# Patient Record
Sex: MALE | Race: OTHER | Employment: UNEMPLOYED | URBAN - METROPOLITAN AREA
[De-identification: names, ages, dates, MRNs, and addresses within clinical notes are randomized per-mention and may not be internally consistent; named-entity substitution may affect disease eponyms.]

---

## 2017-06-12 PROBLEM — M54.6 BILATERAL THORACIC BACK PAIN: Status: ACTIVE | Noted: 2017-06-12

## 2017-06-12 PROBLEM — R13.14 PHARYNGOESOPHAGEAL DYSPHAGIA: Status: ACTIVE | Noted: 2017-06-12

## 2017-06-12 PROBLEM — M54.2 CERVICAL PAIN: Status: ACTIVE | Noted: 2017-06-12

## 2017-06-28 ENCOUNTER — OFFICE VISIT (OUTPATIENT)
Dept: GASTROENTEROLOGY | Age: 47
End: 2017-06-28
Payer: MEDICARE

## 2017-06-28 VITALS
WEIGHT: 164 LBS | TEMPERATURE: 98.4 F | BODY MASS INDEX: 24.86 KG/M2 | SYSTOLIC BLOOD PRESSURE: 114 MMHG | OXYGEN SATURATION: 96 % | HEART RATE: 83 BPM | DIASTOLIC BLOOD PRESSURE: 71 MMHG | HEIGHT: 68 IN

## 2017-06-28 DIAGNOSIS — R10.13 DYSPEPSIA: Primary | ICD-10-CM

## 2017-06-28 DIAGNOSIS — R13.14 PHARYNGOESOPHAGEAL DYSPHAGIA: ICD-10-CM

## 2017-06-28 PROCEDURE — 99204 OFFICE O/P NEW MOD 45 MIN: CPT | Performed by: INTERNAL MEDICINE

## 2017-06-28 ASSESSMENT — ENCOUNTER SYMPTOMS
SORE THROAT: 1
ANAL BLEEDING: 0
RECTAL PAIN: 0
COUGH: 0
ABDOMINAL DISTENTION: 0
TROUBLE SWALLOWING: 1
ABDOMINAL PAIN: 0
BACK PAIN: 0
NAUSEA: 0
VOMITING: 0
CHOKING: 1
BLOOD IN STOOL: 0
DIARRHEA: 0
SHORTNESS OF BREATH: 0
CONSTIPATION: 0

## 2017-07-11 ENCOUNTER — HOSPITAL ENCOUNTER (OUTPATIENT)
Dept: GENERAL RADIOLOGY | Age: 47
Discharge: HOME | End: 2017-07-11
Payer: MEDICARE

## 2017-07-11 DIAGNOSIS — R13.14 PHARYNGOESOPHAGEAL DYSPHAGIA: ICD-10-CM

## 2017-07-11 DIAGNOSIS — R10.13 DYSPEPSIA: ICD-10-CM

## 2017-07-11 PROCEDURE — 74230 X-RAY XM SWLNG FUNCJ C+: CPT

## 2017-07-11 PROCEDURE — 74220 X-RAY XM ESOPHAGUS 1CNTRST: CPT

## 2017-07-11 PROCEDURE — 2500000003 HC RX 250 WO HCPCS: Performed by: INTERNAL MEDICINE

## 2017-07-11 PROCEDURE — G8997 SWALLOW GOAL STATUS: HCPCS

## 2017-07-11 PROCEDURE — G8996 SWALLOW CURRENT STATUS: HCPCS

## 2017-07-11 PROCEDURE — 92611 MOTION FLUOROSCOPY/SWALLOW: CPT

## 2017-07-11 RX ADMIN — BARIUM SULFATE 132 ML: 960 POWDER, FOR SUSPENSION ORAL at 09:45

## 2017-07-11 RX ADMIN — BARIUM SULFATE 135 ML: 980 POWDER, FOR SUSPENSION ORAL at 09:45

## 2017-07-11 RX ADMIN — BARIUM SULFATE 1 TABLET: 700 TABLET ORAL at 09:45

## 2017-07-13 ENCOUNTER — TELEPHONE (OUTPATIENT)
Dept: GASTROENTEROLOGY | Age: 47
End: 2017-07-13

## 2017-07-14 DIAGNOSIS — R13.10 DYSPHAGIA, UNSPECIFIED TYPE: Primary | ICD-10-CM

## 2017-07-21 ENCOUNTER — ANESTHESIA EVENT (OUTPATIENT)
Dept: OPERATING ROOM | Age: 47
End: 2017-07-21
Payer: MEDICARE

## 2017-07-24 ENCOUNTER — HOSPITAL ENCOUNTER (OUTPATIENT)
Age: 47
Setting detail: OUTPATIENT SURGERY
Discharge: HOME | End: 2017-07-24
Attending: INTERNAL MEDICINE | Admitting: INTERNAL MEDICINE
Payer: MEDICARE

## 2017-07-24 ENCOUNTER — ANESTHESIA (OUTPATIENT)
Dept: OPERATING ROOM | Age: 47
End: 2017-07-24
Payer: MEDICARE

## 2017-07-24 VITALS — SYSTOLIC BLOOD PRESSURE: 118 MMHG | DIASTOLIC BLOOD PRESSURE: 82 MMHG | OXYGEN SATURATION: 95 %

## 2017-07-24 VITALS
HEART RATE: 59 BPM | WEIGHT: 164.9 LBS | BODY MASS INDEX: 24.99 KG/M2 | TEMPERATURE: 97.7 F | HEIGHT: 68 IN | OXYGEN SATURATION: 95 % | RESPIRATION RATE: 12 BRPM | SYSTOLIC BLOOD PRESSURE: 101 MMHG | DIASTOLIC BLOOD PRESSURE: 67 MMHG

## 2017-07-24 PROCEDURE — 2580000003 HC RX 258: Performed by: ANESTHESIOLOGY

## 2017-07-24 PROCEDURE — 2500000003 HC RX 250 WO HCPCS: Performed by: NURSE ANESTHETIST, CERTIFIED REGISTERED

## 2017-07-24 PROCEDURE — 3700000000 HC ANESTHESIA ATTENDED CARE: Performed by: INTERNAL MEDICINE

## 2017-07-24 PROCEDURE — 3609012700 HC EGD DILATION SAVORY: Performed by: INTERNAL MEDICINE

## 2017-07-24 PROCEDURE — 3700000001 HC ADD 15 MINUTES (ANESTHESIA): Performed by: INTERNAL MEDICINE

## 2017-07-24 PROCEDURE — 6360000002 HC RX W HCPCS: Performed by: NURSE ANESTHETIST, CERTIFIED REGISTERED

## 2017-07-24 PROCEDURE — 7100000011 HC PHASE II RECOVERY - ADDTL 15 MIN: Performed by: INTERNAL MEDICINE

## 2017-07-24 PROCEDURE — 7100000010 HC PHASE II RECOVERY - FIRST 15 MIN: Performed by: INTERNAL MEDICINE

## 2017-07-24 RX ORDER — FENTANYL CITRATE 50 UG/ML
INJECTION, SOLUTION INTRAMUSCULAR; INTRAVENOUS PRN
Status: DISCONTINUED | OUTPATIENT
Start: 2017-07-24 | End: 2017-07-24 | Stop reason: SDUPTHER

## 2017-07-24 RX ORDER — LIDOCAINE HYDROCHLORIDE 20 MG/ML
INJECTION, SOLUTION EPIDURAL; INFILTRATION; INTRACAUDAL; PERINEURAL PRN
Status: DISCONTINUED | OUTPATIENT
Start: 2017-07-24 | End: 2017-07-24 | Stop reason: SDUPTHER

## 2017-07-24 RX ORDER — SODIUM CHLORIDE 9 MG/ML
INJECTION, SOLUTION INTRAVENOUS CONTINUOUS
Status: DISCONTINUED | OUTPATIENT
Start: 2017-07-24 | End: 2017-07-24

## 2017-07-24 RX ORDER — SODIUM CHLORIDE 0.9 % (FLUSH) 0.9 %
10 SYRINGE (ML) INJECTION EVERY 12 HOURS SCHEDULED
Status: DISCONTINUED | OUTPATIENT
Start: 2017-07-24 | End: 2017-07-24 | Stop reason: HOSPADM

## 2017-07-24 RX ORDER — SODIUM CHLORIDE 0.9 % (FLUSH) 0.9 %
10 SYRINGE (ML) INJECTION PRN
Status: DISCONTINUED | OUTPATIENT
Start: 2017-07-24 | End: 2017-07-24 | Stop reason: HOSPADM

## 2017-07-24 RX ORDER — SODIUM CHLORIDE, SODIUM LACTATE, POTASSIUM CHLORIDE, CALCIUM CHLORIDE 600; 310; 30; 20 MG/100ML; MG/100ML; MG/100ML; MG/100ML
INJECTION, SOLUTION INTRAVENOUS CONTINUOUS
Status: DISCONTINUED | OUTPATIENT
Start: 2017-07-24 | End: 2017-07-24 | Stop reason: HOSPADM

## 2017-07-24 RX ORDER — PROPOFOL 10 MG/ML
INJECTION, EMULSION INTRAVENOUS PRN
Status: DISCONTINUED | OUTPATIENT
Start: 2017-07-24 | End: 2017-07-24 | Stop reason: SDUPTHER

## 2017-07-24 RX ORDER — LIDOCAINE HYDROCHLORIDE 10 MG/ML
1 INJECTION, SOLUTION EPIDURAL; INFILTRATION; INTRACAUDAL; PERINEURAL
Status: DISCONTINUED | OUTPATIENT
Start: 2017-07-24 | End: 2017-07-24 | Stop reason: HOSPADM

## 2017-07-24 RX ADMIN — FENTANYL CITRATE 50 MCG: 50 INJECTION, SOLUTION INTRAMUSCULAR; INTRAVENOUS at 10:04

## 2017-07-24 RX ADMIN — LIDOCAINE HYDROCHLORIDE 100 MG: 20 INJECTION, SOLUTION EPIDURAL; INFILTRATION; INTRACAUDAL; PERINEURAL at 10:05

## 2017-07-24 RX ADMIN — SODIUM CHLORIDE, POTASSIUM CHLORIDE, SODIUM LACTATE AND CALCIUM CHLORIDE: 600; 310; 30; 20 INJECTION, SOLUTION INTRAVENOUS at 10:03

## 2017-07-24 RX ADMIN — PROPOFOL 20 MG: 10 INJECTION, EMULSION INTRAVENOUS at 10:11

## 2017-07-24 RX ADMIN — PROPOFOL 10 MG: 10 INJECTION, EMULSION INTRAVENOUS at 10:15

## 2017-07-24 RX ADMIN — PROPOFOL 40 MG: 10 INJECTION, EMULSION INTRAVENOUS at 10:09

## 2017-07-24 RX ADMIN — PROPOFOL 10 MG: 10 INJECTION, EMULSION INTRAVENOUS at 10:13

## 2017-07-24 RX ADMIN — FENTANYL CITRATE 50 MCG: 50 INJECTION, SOLUTION INTRAMUSCULAR; INTRAVENOUS at 10:09

## 2017-07-24 RX ADMIN — SODIUM CHLORIDE, POTASSIUM CHLORIDE, SODIUM LACTATE AND CALCIUM CHLORIDE: 600; 310; 30; 20 INJECTION, SOLUTION INTRAVENOUS at 09:57

## 2017-07-24 ASSESSMENT — ENCOUNTER SYMPTOMS: SHORTNESS OF BREATH: 0

## 2017-07-24 ASSESSMENT — PAIN - FUNCTIONAL ASSESSMENT: PAIN_FUNCTIONAL_ASSESSMENT: 0-10

## 2017-09-12 PROBLEM — Z00.00 ANNUAL PHYSICAL EXAM: Status: ACTIVE | Noted: 2017-09-12

## 2017-09-21 ENCOUNTER — HOSPITAL ENCOUNTER (OUTPATIENT)
Age: 47
Discharge: HOME | End: 2017-09-21
Payer: MEDICARE

## 2017-09-21 DIAGNOSIS — Z00.00 ANNUAL PHYSICAL EXAM: ICD-10-CM

## 2017-09-21 LAB
-: ABNORMAL
ABSOLUTE EOS #: 0 K/UL (ref 0–0.4)
ABSOLUTE LYMPH #: 1.5 K/UL (ref 1–4.8)
ABSOLUTE MONO #: 0.3 K/UL (ref 0.2–0.8)
ALBUMIN SERPL-MCNC: 4.3 G/DL (ref 3.5–5.2)
ALBUMIN/GLOBULIN RATIO: NORMAL (ref 1–2.5)
ALP BLD-CCNC: 57 U/L (ref 40–129)
ALT SERPL-CCNC: 24 U/L (ref 5–41)
AMORPHOUS: ABNORMAL
ANION GAP SERPL CALCULATED.3IONS-SCNC: 15 MMOL/L (ref 9–17)
AST SERPL-CCNC: 22 U/L
BACTERIA: ABNORMAL
BASOPHILS # BLD: 1 %
BASOPHILS ABSOLUTE: 0 K/UL (ref 0–0.2)
BILIRUB SERPL-MCNC: 0.67 MG/DL (ref 0.3–1.2)
BILIRUBIN URINE: NEGATIVE
BUN BLDV-MCNC: 12 MG/DL (ref 6–20)
BUN/CREAT BLD: 14 (ref 9–20)
CALCIUM SERPL-MCNC: 9.1 MG/DL (ref 8.6–10.4)
CASTS UA: ABNORMAL /LPF
CHLORIDE BLD-SCNC: 99 MMOL/L (ref 98–107)
CHOLESTEROL/HDL RATIO: 3
CHOLESTEROL: 208 MG/DL
CO2: 27 MMOL/L (ref 20–31)
COLOR: YELLOW
COMMENT UA: ABNORMAL
CREAT SERPL-MCNC: 0.88 MG/DL (ref 0.7–1.2)
CRYSTALS, UA: ABNORMAL /HPF
DIFFERENTIAL TYPE: NORMAL
EOSINOPHILS RELATIVE PERCENT: 1 %
EPITHELIAL CELLS UA: ABNORMAL /HPF
GFR AFRICAN AMERICAN: >60 ML/MIN
GFR NON-AFRICAN AMERICAN: >60 ML/MIN
GFR SERPL CREATININE-BSD FRML MDRD: NORMAL ML/MIN/{1.73_M2}
GFR SERPL CREATININE-BSD FRML MDRD: NORMAL ML/MIN/{1.73_M2}
GLUCOSE BLD-MCNC: 90 MG/DL (ref 70–99)
GLUCOSE URINE: NEGATIVE
HCT VFR BLD CALC: 48 % (ref 41–53)
HDLC SERPL-MCNC: 70 MG/DL
HEMOGLOBIN: 16 G/DL (ref 13.5–17.5)
KETONES, URINE: NEGATIVE
LDL CHOLESTEROL: 112 MG/DL (ref 0–130)
LEUKOCYTE ESTERASE, URINE: NEGATIVE
LYMPHOCYTES # BLD: 37 %
MCH RBC QN AUTO: 27.7 PG (ref 26–34)
MCHC RBC AUTO-ENTMCNC: 33.4 G/DL (ref 31–37)
MCV RBC AUTO: 83 FL (ref 80–100)
MONOCYTES # BLD: 8 %
MUCUS: ABNORMAL
NITRITE, URINE: NEGATIVE
OTHER OBSERVATIONS UA: ABNORMAL
PDW BLD-RTO: 12.7 % (ref 11.5–14.5)
PH UA: 6.5 (ref 5–8)
PLATELET # BLD: 189 K/UL (ref 130–400)
PLATELET ESTIMATE: NORMAL
PMV BLD AUTO: NORMAL FL (ref 6–12)
POTASSIUM SERPL-SCNC: 4 MMOL/L (ref 3.7–5.3)
PROSTATE SPECIFIC ANTIGEN: 1.42 UG/L
PROTEIN UA: NEGATIVE
RBC # BLD: 5.79 M/UL (ref 4.5–5.9)
RBC # BLD: NORMAL 10*6/UL
RBC UA: ABNORMAL /HPF (ref 0–2)
RENAL EPITHELIAL, UA: ABNORMAL /HPF
SEG NEUTROPHILS: 53 %
SEGMENTED NEUTROPHILS ABSOLUTE COUNT: 2.3 K/UL (ref 1.8–7.7)
SODIUM BLD-SCNC: 141 MMOL/L (ref 135–144)
SPECIFIC GRAVITY UA: 1.02 (ref 1–1.03)
TOTAL PROTEIN: 7 G/DL (ref 6.4–8.3)
TRICHOMONAS: ABNORMAL
TRIGL SERPL-MCNC: 131 MG/DL
TSH SERPL DL<=0.05 MIU/L-ACNC: 1.86 MIU/L (ref 0.3–5)
TURBIDITY: ABNORMAL
URINE HGB: NEGATIVE
UROBILINOGEN, URINE: NORMAL
VITAMIN D 25-HYDROXY: 35 NG/ML (ref 30–100)
VLDLC SERPL CALC-MCNC: ABNORMAL MG/DL (ref 1–30)
WBC # BLD: 4.1 K/UL (ref 3.5–11)
WBC # BLD: NORMAL 10*3/UL
WBC UA: ABNORMAL /HPF (ref 0–5)
YEAST: ABNORMAL

## 2017-09-21 PROCEDURE — 80053 COMPREHEN METABOLIC PANEL: CPT

## 2017-09-21 PROCEDURE — 85025 COMPLETE CBC W/AUTO DIFF WBC: CPT

## 2017-09-21 PROCEDURE — 36415 COLL VENOUS BLD VENIPUNCTURE: CPT

## 2017-09-21 PROCEDURE — G0103 PSA SCREENING: HCPCS

## 2017-09-21 PROCEDURE — 82306 VITAMIN D 25 HYDROXY: CPT

## 2017-09-21 PROCEDURE — 81001 URINALYSIS AUTO W/SCOPE: CPT

## 2017-09-21 PROCEDURE — 84443 ASSAY THYROID STIM HORMONE: CPT

## 2017-09-21 PROCEDURE — 80061 LIPID PANEL: CPT

## 2017-09-26 PROBLEM — Z71.85 IMMUNIZATION COUNSELING: Status: ACTIVE | Noted: 2017-09-26

## 2018-01-12 PROBLEM — B02.9 HERPES ZOSTER WITHOUT COMPLICATION: Status: ACTIVE | Noted: 2018-01-12

## 2018-03-26 PROBLEM — S80.01XA CONTUSION OF RIGHT KNEE: Status: ACTIVE | Noted: 2018-03-26

## 2018-04-12 PROBLEM — Z00.00 ANNUAL PHYSICAL EXAM: Status: RESOLVED | Noted: 2017-09-12 | Resolved: 2018-04-12

## 2018-06-05 PROBLEM — Z23 NEED FOR SHINGLES VACCINE: Status: ACTIVE | Noted: 2018-06-05

## 2018-07-17 PROBLEM — Z71.85 IMMUNIZATION COUNSELING: Status: RESOLVED | Noted: 2017-09-26 | Resolved: 2018-07-17

## 2018-07-17 PROBLEM — G89.29 CHRONIC BILATERAL LOW BACK PAIN WITHOUT SCIATICA: Status: ACTIVE | Noted: 2018-07-17

## 2018-07-17 PROBLEM — S80.01XA CONTUSION OF RIGHT KNEE: Status: RESOLVED | Noted: 2018-03-26 | Resolved: 2018-07-17

## 2018-07-17 PROBLEM — B02.9 HERPES ZOSTER WITHOUT COMPLICATION: Status: RESOLVED | Noted: 2018-01-12 | Resolved: 2018-07-17

## 2018-07-17 PROBLEM — M54.50 CHRONIC BILATERAL LOW BACK PAIN WITHOUT SCIATICA: Status: ACTIVE | Noted: 2018-07-17

## 2018-07-17 PROBLEM — Z23 NEED FOR SHINGLES VACCINE: Status: RESOLVED | Noted: 2018-06-05 | Resolved: 2018-07-17

## 2018-08-28 PROBLEM — M54.2 CERVICAL PAIN: Status: ACTIVE | Noted: 2018-08-28

## 2018-08-28 PROBLEM — M50.30 DDD (DEGENERATIVE DISC DISEASE), CERVICAL: Status: ACTIVE | Noted: 2018-08-28

## 2018-08-28 PROBLEM — Z12.11 SCREEN FOR COLON CANCER: Status: ACTIVE | Noted: 2018-08-28

## 2018-09-27 PROBLEM — Z12.11 SCREEN FOR COLON CANCER: Status: RESOLVED | Noted: 2018-08-28 | Resolved: 2018-09-27

## 2018-10-04 PROBLEM — M79.10 MYALGIA: Status: ACTIVE | Noted: 2018-10-04

## 2019-07-08 ENCOUNTER — HOSPITAL ENCOUNTER (OUTPATIENT)
Age: 49
Discharge: HOME | End: 2019-07-10
Payer: MEDICARE

## 2019-07-08 ENCOUNTER — HOSPITAL ENCOUNTER (OUTPATIENT)
Dept: GENERAL RADIOLOGY | Age: 49
Discharge: HOME | End: 2019-07-10
Payer: MEDICARE

## 2019-07-08 DIAGNOSIS — M25.572 ACUTE LEFT ANKLE PAIN: ICD-10-CM

## 2019-07-08 PROCEDURE — 73610 X-RAY EXAM OF ANKLE: CPT

## 2019-07-23 PROBLEM — M72.2 PLANTAR FASCIITIS: Status: ACTIVE | Noted: 2019-07-23

## 2019-07-30 ENCOUNTER — HOSPITAL ENCOUNTER (OUTPATIENT)
Age: 49
Discharge: HOME | End: 2019-07-30
Payer: MEDICARE

## 2019-07-30 DIAGNOSIS — E55.9 VITAMIN D DEFICIENCY: ICD-10-CM

## 2019-07-30 PROCEDURE — 82306 VITAMIN D 25 HYDROXY: CPT

## 2019-07-30 PROCEDURE — 36415 COLL VENOUS BLD VENIPUNCTURE: CPT

## 2019-07-31 LAB — VITAMIN D 25-HYDROXY: 30.9 NG/ML (ref 30–100)

## 2019-08-20 PROBLEM — M25.572 ACUTE LEFT ANKLE PAIN: Status: ACTIVE | Noted: 2019-08-20

## 2019-08-20 PROBLEM — M77.32 CALCANEAL SPUR OF FOOT, LEFT: Status: ACTIVE | Noted: 2019-08-20

## 2019-08-22 PROBLEM — Z12.11 SCREEN FOR COLON CANCER: Status: RESOLVED | Noted: 2018-08-28 | Resolved: 2019-08-22

## 2019-09-03 PROBLEM — R53.83 FATIGUE: Status: ACTIVE | Noted: 2019-09-03

## 2019-09-03 PROBLEM — Z00.00 ENCOUNTER FOR ANNUAL PHYSICAL EXAM: Status: ACTIVE | Noted: 2019-09-03

## 2019-09-03 PROBLEM — Z12.5 SCREENING FOR PROSTATE CANCER: Status: ACTIVE | Noted: 2019-09-03

## 2019-09-06 ENCOUNTER — TELEPHONE (OUTPATIENT)
Dept: GASTROENTEROLOGY | Age: 49
End: 2019-09-06

## 2019-09-18 ENCOUNTER — HOSPITAL ENCOUNTER (OUTPATIENT)
Age: 49
Discharge: HOME | End: 2019-09-18
Payer: MEDICARE

## 2019-09-18 DIAGNOSIS — E78.5 DYSLIPIDEMIA: ICD-10-CM

## 2019-09-18 DIAGNOSIS — R53.83 FATIGUE, UNSPECIFIED TYPE: ICD-10-CM

## 2019-09-18 DIAGNOSIS — Z12.5 SCREENING FOR PROSTATE CANCER: ICD-10-CM

## 2019-09-18 DIAGNOSIS — M79.18 MYOFASCIAL MUSCLE PAIN: ICD-10-CM

## 2019-09-18 LAB
ABSOLUTE EOS #: 0.19 K/UL (ref 0–0.44)
ABSOLUTE IMMATURE GRANULOCYTE: 0.03 K/UL (ref 0–0.3)
ABSOLUTE LYMPH #: 1.86 K/UL (ref 1.1–3.7)
ABSOLUTE MONO #: 0.72 K/UL (ref 0.1–1.2)
ALBUMIN SERPL-MCNC: 4.5 G/DL (ref 3.5–5.2)
ALBUMIN/GLOBULIN RATIO: 1.7 (ref 1–2.5)
ALP BLD-CCNC: 70 U/L (ref 40–129)
ALT SERPL-CCNC: 42 U/L (ref 5–41)
ANION GAP SERPL CALCULATED.3IONS-SCNC: 15 MMOL/L (ref 9–17)
AST SERPL-CCNC: 28 U/L
BASOPHILS # BLD: 0 % (ref 0–2)
BASOPHILS ABSOLUTE: <0.03 K/UL (ref 0–0.2)
BILIRUB SERPL-MCNC: 0.54 MG/DL (ref 0.3–1.2)
BUN BLDV-MCNC: 17 MG/DL (ref 6–20)
BUN/CREAT BLD: ABNORMAL (ref 9–20)
CALCIUM SERPL-MCNC: 9.1 MG/DL (ref 8.6–10.4)
CHLORIDE BLD-SCNC: 101 MMOL/L (ref 98–107)
CHOLESTEROL, FASTING: 241 MG/DL
CHOLESTEROL/HDL RATIO: 3.5
CO2: 23 MMOL/L (ref 20–31)
CREAT SERPL-MCNC: 0.87 MG/DL (ref 0.7–1.2)
DIFFERENTIAL TYPE: ABNORMAL
EOSINOPHILS RELATIVE PERCENT: 2 % (ref 1–4)
GFR AFRICAN AMERICAN: >60 ML/MIN
GFR NON-AFRICAN AMERICAN: >60 ML/MIN
GFR SERPL CREATININE-BSD FRML MDRD: ABNORMAL ML/MIN/{1.73_M2}
GFR SERPL CREATININE-BSD FRML MDRD: ABNORMAL ML/MIN/{1.73_M2}
GLUCOSE FASTING: 111 MG/DL (ref 70–99)
HCT VFR BLD CALC: 48 % (ref 40.7–50.3)
HDLC SERPL-MCNC: 68 MG/DL
HEMOGLOBIN: 15.8 G/DL (ref 13–17)
IMMATURE GRANULOCYTES: 0 %
LDL CHOLESTEROL: 159 MG/DL (ref 0–130)
LYMPHOCYTES # BLD: 23 % (ref 24–43)
MCH RBC QN AUTO: 27.9 PG (ref 25.2–33.5)
MCHC RBC AUTO-ENTMCNC: 32.9 G/DL (ref 28.4–34.8)
MCV RBC AUTO: 84.8 FL (ref 82.6–102.9)
MONOCYTES # BLD: 9 % (ref 3–12)
NRBC AUTOMATED: 0 PER 100 WBC
PDW BLD-RTO: 13.7 % (ref 11.8–14.4)
PLATELET # BLD: 218 K/UL (ref 138–453)
PLATELET ESTIMATE: ABNORMAL
PMV BLD AUTO: 11 FL (ref 8.1–13.5)
POTASSIUM SERPL-SCNC: 4 MMOL/L (ref 3.7–5.3)
PROSTATE SPECIFIC ANTIGEN: 2.03 UG/L
RBC # BLD: 5.66 M/UL (ref 4.21–5.77)
RBC # BLD: ABNORMAL 10*6/UL
SEDIMENTATION RATE, ERYTHROCYTE: 1 MM (ref 0–10)
SEG NEUTROPHILS: 65 % (ref 36–65)
SEGMENTED NEUTROPHILS ABSOLUTE COUNT: 5.26 K/UL (ref 1.5–8.1)
SODIUM BLD-SCNC: 139 MMOL/L (ref 135–144)
TOTAL PROTEIN: 7.2 G/DL (ref 6.4–8.3)
TRIGLYCERIDE, FASTING: 70 MG/DL
TSH SERPL DL<=0.05 MIU/L-ACNC: 1.19 MIU/L (ref 0.3–5)
VLDLC SERPL CALC-MCNC: ABNORMAL MG/DL (ref 1–30)
WBC # BLD: 8.1 K/UL (ref 3.5–11.3)
WBC # BLD: ABNORMAL 10*3/UL

## 2019-09-18 PROCEDURE — 85025 COMPLETE CBC W/AUTO DIFF WBC: CPT

## 2019-09-18 PROCEDURE — G0103 PSA SCREENING: HCPCS

## 2019-09-18 PROCEDURE — 84443 ASSAY THYROID STIM HORMONE: CPT

## 2019-09-18 PROCEDURE — 36415 COLL VENOUS BLD VENIPUNCTURE: CPT

## 2019-09-18 PROCEDURE — 80053 COMPREHEN METABOLIC PANEL: CPT

## 2019-09-18 PROCEDURE — 80061 LIPID PANEL: CPT

## 2019-09-18 PROCEDURE — 85651 RBC SED RATE NONAUTOMATED: CPT

## 2019-10-03 PROBLEM — Z12.11 SCREEN FOR COLON CANCER: Status: RESOLVED | Noted: 2018-08-28 | Resolved: 2019-10-03

## 2019-10-03 PROBLEM — Z12.5 SCREENING FOR PROSTATE CANCER: Status: RESOLVED | Noted: 2019-09-03 | Resolved: 2019-10-03

## 2020-01-11 ENCOUNTER — HOSPITAL ENCOUNTER (OUTPATIENT)
Dept: MRI IMAGING | Age: 50
Discharge: HOME | End: 2020-01-13
Payer: MEDICARE

## 2020-01-11 PROCEDURE — 73721 MRI JNT OF LWR EXTRE W/O DYE: CPT

## 2020-02-06 ENCOUNTER — HOSPITAL ENCOUNTER (OUTPATIENT)
Dept: GENERAL RADIOLOGY | Age: 50
Discharge: HOME | End: 2020-02-08
Payer: MEDICARE

## 2020-02-06 ENCOUNTER — HOSPITAL ENCOUNTER (OUTPATIENT)
Age: 50
Discharge: HOME | End: 2020-02-08
Payer: MEDICARE

## 2020-02-06 PROCEDURE — 73080 X-RAY EXAM OF ELBOW: CPT

## 2020-02-07 PROBLEM — M25.521 RIGHT ELBOW PAIN: Status: ACTIVE | Noted: 2020-02-07

## 2020-04-30 PROBLEM — Z20.828 VIRAL DISEASE EXPOSURE: Status: ACTIVE | Noted: 2020-04-30

## 2020-04-30 PROBLEM — M77.01 MEDIAL EPICONDYLITIS OF RIGHT ELBOW: Status: ACTIVE | Noted: 2020-04-30

## 2020-04-30 PROBLEM — M77.11 LATERAL EPICONDYLITIS OF RIGHT ELBOW: Status: ACTIVE | Noted: 2020-04-30

## 2020-05-11 PROBLEM — M77.12 LATERAL EPICONDYLITIS OF BOTH ELBOWS: Status: ACTIVE | Noted: 2020-04-30

## 2020-06-09 ENCOUNTER — HOSPITAL ENCOUNTER (OUTPATIENT)
Dept: GENERAL RADIOLOGY | Age: 50
Discharge: HOME | End: 2020-06-11
Payer: MEDICARE

## 2020-06-09 ENCOUNTER — HOSPITAL ENCOUNTER (OUTPATIENT)
Age: 50
Discharge: HOME | End: 2020-06-11
Payer: MEDICARE

## 2020-06-09 PROBLEM — M65.4 TENDINITIS, DE QUERVAIN'S: Status: ACTIVE | Noted: 2020-06-09

## 2020-06-09 PROBLEM — M25.532 LEFT WRIST PAIN: Status: ACTIVE | Noted: 2020-06-09

## 2020-06-09 PROCEDURE — 73110 X-RAY EXAM OF WRIST: CPT

## 2020-09-30 PROBLEM — K13.70 MOUTH LESION: Status: ACTIVE | Noted: 2020-09-30

## 2020-10-01 ENCOUNTER — HOSPITAL ENCOUNTER (OUTPATIENT)
Age: 50
Discharge: HOME | End: 2020-10-01
Payer: MEDICARE

## 2020-10-01 LAB
CHOLESTEROL, FASTING: 193 MG/DL
CHOLESTEROL/HDL RATIO: 3.4
HDLC SERPL-MCNC: 56 MG/DL
LDL CHOLESTEROL: 123 MG/DL (ref 0–130)
TRIGLYCERIDE, FASTING: 70 MG/DL
VLDLC SERPL CALC-MCNC: NORMAL MG/DL (ref 1–30)

## 2020-10-01 PROCEDURE — 80061 LIPID PANEL: CPT

## 2020-10-01 PROCEDURE — 36415 COLL VENOUS BLD VENIPUNCTURE: CPT

## 2020-11-18 PROBLEM — R13.14 PHARYNGOESOPHAGEAL DYSPHAGIA: Status: RESOLVED | Noted: 2017-06-12 | Resolved: 2020-11-18

## 2020-11-18 PROBLEM — M54.2 CERVICAL PAIN: Status: ACTIVE | Noted: 2020-11-18

## 2021-04-15 PROBLEM — Z02.89 HEALTH EXAMINATION OF DEFINED SUBPOPULATION: Status: ACTIVE | Noted: 2021-04-15

## 2021-04-15 PROBLEM — Z23 NEED FOR PROPHYLACTIC VACCINATION WITH MEASLES-MUMPS-RUBELLA (MMR) VACCINE: Status: ACTIVE | Noted: 2021-04-15

## 2021-04-15 PROBLEM — Z23 NEED FOR PROPHYLACTIC VACCINATION WITH COMBINED DIPHTHERIA-TETANUS-PERTUSSIS (DTP) VACCINE: Status: ACTIVE | Noted: 2021-04-15

## 2021-04-15 PROBLEM — Z23 NEED FOR HEPATITIS A IMMUNIZATION: Status: ACTIVE | Noted: 2021-04-15

## 2021-04-19 PROBLEM — M77.12 LATERAL EPICONDYLITIS OF LEFT ELBOW: Status: ACTIVE | Noted: 2021-04-19

## 2021-05-15 PROBLEM — Z12.11 SCREEN FOR COLON CANCER: Status: RESOLVED | Noted: 2018-08-28 | Resolved: 2021-05-15

## 2021-07-28 ENCOUNTER — INITIAL CONSULT (OUTPATIENT)
Dept: PHYSICAL MEDICINE AND REHAB | Age: 51
End: 2021-07-28
Payer: MEDICARE

## 2021-07-28 VITALS
WEIGHT: 197.2 LBS | BODY MASS INDEX: 29.89 KG/M2 | DIASTOLIC BLOOD PRESSURE: 78 MMHG | SYSTOLIC BLOOD PRESSURE: 128 MMHG | HEART RATE: 98 BPM | HEIGHT: 68 IN | TEMPERATURE: 98.1 F

## 2021-07-28 DIAGNOSIS — M54.2 CHRONIC NECK PAIN: Primary | ICD-10-CM

## 2021-07-28 DIAGNOSIS — G89.29 CHRONIC NECK PAIN: Primary | ICD-10-CM

## 2021-07-28 DIAGNOSIS — M77.12 LEFT LATERAL EPICONDYLITIS: ICD-10-CM

## 2021-07-28 PROCEDURE — G8417 CALC BMI ABV UP PARAM F/U: HCPCS | Performed by: STUDENT IN AN ORGANIZED HEALTH CARE EDUCATION/TRAINING PROGRAM

## 2021-07-28 PROCEDURE — 99203 OFFICE O/P NEW LOW 30 MIN: CPT | Performed by: STUDENT IN AN ORGANIZED HEALTH CARE EDUCATION/TRAINING PROGRAM

## 2021-07-28 PROCEDURE — G8427 DOCREV CUR MEDS BY ELIG CLIN: HCPCS | Performed by: STUDENT IN AN ORGANIZED HEALTH CARE EDUCATION/TRAINING PROGRAM

## 2021-07-28 PROCEDURE — 1036F TOBACCO NON-USER: CPT | Performed by: STUDENT IN AN ORGANIZED HEALTH CARE EDUCATION/TRAINING PROGRAM

## 2021-07-28 PROCEDURE — 3017F COLORECTAL CA SCREEN DOC REV: CPT | Performed by: STUDENT IN AN ORGANIZED HEALTH CARE EDUCATION/TRAINING PROGRAM

## 2021-07-28 RX ORDER — M-VIT,TX,IRON,MINS/CALC/FOLIC 27MG-0.4MG
1 TABLET ORAL DAILY
COMMUNITY
End: 2022-09-08

## 2021-07-28 RX ORDER — CYCLOSPORINE 0.5 MG/ML
EMULSION OPHTHALMIC
COMMUNITY
Start: 2021-07-27 | End: 2022-09-08

## 2021-07-28 RX ORDER — CHLORAL HYDRATE 500 MG
3000 CAPSULE ORAL 3 TIMES DAILY
COMMUNITY
End: 2022-09-08

## 2021-08-01 NOTE — PROGRESS NOTES
[facility]  [facility] PHYSICAL MEDICINE AND REHABILITATION  [address] [address]  Dept: [phone]  Dept Fax: [phone]    New Patient Note    Aly Zhu is a 46y.o.-year-old right-handed male presenting with Neck Pain and Arm Pain  . HPI:     He reports constant bilateral neck pain, worse on the left, which has been present for about 20 years. He states that he had an injury to the neck in 2001 in which a box fell onto his posterior head. He subsequently had pain, numbness/tingling, and weakness in the bilateral upper limbs. He states that he started going to a chiropractor in 2008, which helps. He has not had surgery on his neck. He describes his current pain as an ache and \"tightness. \"  He notes feeling \"restricted\" with ROM in the neck as well. He denies any radiation of pain to the bilateral upper limbs. He also denies any numbness/tingling in the bilateral upper limbs. He reports weakness in the left hand related to pain in the left elbow and forearm but denies any other weakness. He also notes constant left upper limb pain, which he localizes from the left elbow to the hand. He states that pain affects the medial and lateral elbow and affects the thumb more than the other digits. He describes the pain as aching and notes that it is worse with making a fist and lifting things, as well as with yard work. He rates it at 7-8 out of 10. He reports having some intermittent pain in the right upper limb as well but rates it at 1 out of 10. He is taking celebrex, amitriptyline, and as-needed flexeril, which help some with pain. He has also tried heat/ice, voltaren gel (on left upper limb), TENS unit (on neck), an OTC brace (on left upper limb), and baclofen - none of which helped. He has not done physical therapy for neck or left upper limb pain. He does swim and/or participate in aerobic water exercises about 3 times weekly.       Past Medical History: Diagnosis Date    ALT (SGPT) level raised 2015    Bilateral thoracic back pain 2015    Bronchitis 2015    Contusion of right knee 3/26/2018    DDD (degenerative disc disease), cervical 2015    Dyslipidemia 2015    Dyspepsia 2015    Fractured toe 2020    Herniated cervical disc 2015    Herpes zoster without complication 1561    Hypoglycemia 2016    Hypogonadism in male     Immunization counseling 2017    Insomnia 2015    Leukocytopenia, unspecified 2016    Leukopenia 2016    Myofascial muscle pain 2015    Need for shingles vaccine 2018    Pain in abdominal muscle of right flank 2016    Pharyngoesophageal dysphagia 2017    Post laminectomy syndrome 10/17/2016    Prostate cancer screening 2016    Rhinosinusitis 2015      Past Surgical History:   Procedure Laterality Date    CARDIAC CATHETERIZATION  2006    COLONOSCOPY      ENDOSCOPY, COLON, DIAGNOSTIC  2008    HERNIA REPAIR Bilateral 2005    LUMBAR LAMINECTOMY  2013    by Dr. Khanh Garcia N/A 2017    EGD DILATION SAVORY performed by Kurt Ojeda MD at [address] [place] [address] History   Family history unknown: Yes     Social History     Socioeconomic History    Marital status:      Spouse name: None    Number of children: None    Years of education: None    Highest education level: None   Occupational History    None   Tobacco Use    Smoking status: Former Smoker     Quit date: 1997     Years since quittin.5    Smokeless tobacco: Never Used   Vaping Use    Vaping Use: Never used   Substance and Sexual Activity    Alcohol use: No    Drug use: No    Sexual activity: Yes     Partners: Female   Other Topics Concern    None   Social History Narrative    None     Social Determinants of Health     Financial Resource Strain:     Difficulty of Paying Living Expenses:    Food Insecurity:     Negative for numbness. Objective:     Physical Exam  /78   Pulse 98   Temp 98.1 °F (36.7 °C) (Skin)   Ht 5' 8\" (1.727 m)   Wt 197 lb 3.2 oz (89.4 kg)   BMI 29.98 kg/m²     Constitutional: He appears well-developed and well-nourished. In no distress. HEENT: NCAT, EOMI. Hearing grossly intact. Pulmonary/Chest: Respirations WNL and unlabored. MSK:  Tenderness to palpation of the superior cervical paraspinal musculature near the base of the skull. No tenderness to palpation of the cervical spine, upper thoracic spine, bilateral trapezius muscles, and upper thoracic paraspinal musculature. Normal cervical spine ROM; pain with left rotation and right lateral bending. Strength 5/5 in bilateral upper limbs. Tenderness to palpation of the left wrist extensor tendons. Pain with resisted left wrist extension. Pain at the left lateral elbow with lifting a chair with bilateral elbows extended. Neurological: He is alert and oriented to person, place, and time. Sensation to light touch intact in bilateral upper limbs. Negative Tinel sign at the left medial elbow. Skin: Skin is warm and dry with good turgor. Psychiatric: He has a normal mood and affect. His behavior is normal. Thought content normal.    Nursing note and vitals reviewed. Assessment:       Diagnosis Orders   1. Chronic neck pain  [facility]   2.  Left lateral epicondylitis  [address] [place] [address], Sports Medicine and Primary Care  [place]          Plan:      - Placed referral for PT for neck pain and left lateral elbow pain  - Placed referral to Dr. Octavio Cesar for possible ultrasound-guided injection for left lateral epicondylitis to help patient with tolerance to therapy  - Patient is currently taking celebrex, amitriptyline, and as-needed flexeril for pain  - Could consider MRI of cervical spine in the future if pain is persistent    Orders Placed This Encounter Procedures    [facility]     Referral Priority:   Routine     Referral Type:   Eval and Treat     Referral Reason:   Specialty Services Required     Requested Specialty:   Physical Therapy     Number of Visits Requested:   Marie Monique DO, Sports Medicine and Primary Care  [place]     Referral Priority:   Routine     Referral Type:   Eval and Treat     Referral Reason:   Specialty Services Required     Referred to Provider:   Yusuf Wilkins DO     Requested Specialty:   Sports Medicine     Number of Visits Requested:   1       Return in about 3 months (around 10/28/2021).        Electronically signed by Temitope Diaz MD on 8/1/2021 at 8:08 PM

## 2021-08-02 ENCOUNTER — HOSPITAL ENCOUNTER (OUTPATIENT)
Dept: PHYSICAL THERAPY | Facility: CLINIC | Age: 51
Setting detail: THERAPIES SERIES
Discharge: HOME | End: 2021-08-02
Payer: MEDICARE

## 2021-08-02 PROCEDURE — 97110 THERAPEUTIC EXERCISES: CPT

## 2021-08-02 PROCEDURE — 97162 PT EVAL MOD COMPLEX 30 MIN: CPT

## 2021-08-02 NOTE — CONSULTS
[] [facility]) - [facility]  [address]  [phone]  F: [phone] [x] [address]  [place] [address]   [address]  P: [phone]  F: [phone] [] [address] &  Therapy  [address]  P: [phone]  F: [phone] [] [address]  P: [phone]  F: [phone] [] [address]  [facility]   [address]   [place]: [phone]  F: [phone]        Physical Therapy Upper Extremity Evaluation    Date:  2021  Patient: Noris Freire  : 1970  MRN: [id number]  Physician: Manuel Jurado MD   Insurance: [employer] (30 vs/ year)  Medical Diagnosis: M54.2, G89.29 (ICD-10-CM) - Chronic neck pain; M77.12 (ICD-10-CM) - Left lateral epicondylitis   Rehab Codes: M54.2, M79.601, M79.602, M62.81  Onset Date: 21   Next 's appt: 10/12/21    Subjective:   CC: neck pain with headaches; BUE pain limiting activity tolerance  HPI: (onset date): Pt is a R handed 46 y.o. male with complaints of chronic neck pain and BUE elbow pain (L>R). Pt reports having an injury back in  when a 10 lb box fell on the back of his head and he has had neck spasms since. Pt notes he has also had radicular symptoms following this injury which have waxed and waned over the years. Pt did undergo aquatic therapy for this weakness which did help. Pt states his muscle spasms are worse on the left side and states the neck pain is constant. Pt notes his pain at rest is a 2-3/10 and can increase to 8-10/10. Pt describes the pain as an aching pain. Pt reports his pain is worsened with sustained positions of his head/neck (I.e. rotation) and not getting adjusted by his chiropractor.  Pt notes he has difficulty driving secondary to pain turning his head to the R and left. Pt also notes a constant buzzing in his ears which intensifies if he tightens his jaw. Pt also reports headaches that come and go every 2-3 days. Pt reports the pain is on his temples and can be in the back of his head as well. Pt notes the headaches go away on their own. Pt notes his pain is improved with chiropractic adjustments along with medication. Pt also reports having left elbow pain that has been worsening since 2015/2016. Pt reports he has also noticed the pain to be present on the R side at times. Pt states the pain starts on the inside of his elbow and radiates down to the top of his thumb. Pt denies any numbness or tingling but has noticed  strength deficits secondary to pain. Pt also reports having issues with dropping objects due to pain vs weakness. Pt reports this pain is an aching pain and can range from 0-7/8/10. Pt reports this pain is worsened with increased activity with his UEs and he cannot lift heavy objects due to the pain. Pt does not his left thumb pops a lot and it is noticed on the R as well. Pt reports she has difficulty falling asleep secondary to his neck and arm pain but does not wake due to pain. Pt denies any hx of neck surgeries or injections. Pt also reports having an EMG (2006) completed without any abnormal findings.      Comorbidities:   [] Obesity [] Dialysis  [x] Other: Hx of lumbar laminectomy   [] Asthma/COPD [] Dementia [x] Other: DDD, cervical    [] Stroke [] Sleep apnea [] Other:   [] Vascular disease [] Rheumatic disease [] Other:     Tests: [] X-Ray: [] MRI:  [] Other:    Medications: [x] Refer to full medical record [] None [] Other:  Allergies:      [x] Refer to full medical record [] None [] Other:    Function:  Hand Dominance  [x] Right  [] Left  Patient lives with: Family    Employer    Job Status []  Normal duty   [] Light duty   [] Off due to condition    []  Retired   [x] Not employed   [] Disability  [] Other:  [] Return to work: Work activities/duties           Yes  No Comments   Fatigue [] [x]    Fever/chills/sweats [] [x]    Malaise  [] [x]    Mental status change [] [x]    Paresthesias/numbness/weakness [] [x]    Unexplained weight changes [] [x]    Lightheaded/dizziness [] [x]    Shortness of breath [] [x]        Objective:  OBSERVATION No Deficit Deficit Not Tested Comments   Forward Head [] [x] []    Rounded Shoulders [] [x] []    Kyphosis [x] [] []    Scap Height/Position [x] [] []    Winging [x] [] []    SH Rhythm [x] [] []    INSPECTION/PALPATION    Tender over T4  Left side of cervical spine and lateral suboccipital region  Left medial and lateral epicondyle, extensor muscle mass, dorsum of the thumb (dequervains)  Right lateral epicondyle and dorsum of thumb (greater tenderness over dorsum of thumb on R vs left)   NEUROLOGICAL       Sensation [x] [] []      *= PAIN ROM  °A/P STRENGTH TESTS (+/-) Left Right   AROM Left Right Left Right Spurlings - -   Cervical flexion 55  tight WFL WFL Sharp's filippo - -   Extension  43  tight WFL WFL Alar ligament test - -   Rotation  40   pain 54 WFL* WFL Finkelstein  + +   Sidebend 20  Tight and painful on left side 25 painful on left side   Golfer's elbow test - +   Thoracic rotation limited  WFL   Cervical distraction - -   Flexion Symmetrical 5* 5 Froment's sign - -   ABD (0-180) Symmetrical  5* 5 Vertebral artery test - -   ER @ 0 45 90 (0-90) Symmetrical   Pain on left side 5 5      IR (0-90) Symmetrical  5* 5      Elbow Flex. (0-150)   5 5      Elbow Ext. (5/10-0)   5 5      Pronation (0-90)   5* 5      Supination (0-90)   5 5*      Wrist Flex. (0-80)   4+* 5      Wrist Ext. (0-70)   5 5*      Rad. Dev. (0-20)   5 5*      Ulnar Dev.  (0-30)   5 4+*       (elbow bent)   51.7 lbs  pain 67.3 lbs      Pinch (between thumb and index finger)   10.3 lbs 10.3 lbs      Cued to lift head off table: initial movement leading with global neck flexors- head protraction vs retraction   Deep neck flexor endurance test: 30\" with onset of pain  Scapular endurance test (seated with arms in 90/90 position and maintaining scapular retraction): 98\" with some discomfort noted           FUNCTION Normal Difficult Unable   Overhead reach [] [x] []   Underarm reach  [] [x] []   Groom/Dress [] [x] []   Bra/Shirt tuck [] [x] []   Lift/Carry [] [x] []    [] [] []     Outcome Measure: UEFS: 32.5% impairment; NDI: 56% impairment       Assessment: Aly Zhu is a 46 y.o. male who is being seen for chronic neck pain and left elbow pain. Pt reports the left elbow pain has been ongoing for the last 5-6 years and is predominantly on the left, but can also occur on the R. Pt demonstrates with negative ligamentous testing of the cervical spine but does demonstrated altered neuromuscular control with utilization of gobal neck flexors vs deep neck flexors. Pt also demonstrates decreased mobility of the cervical and thoracic spine. Along with chronic neck pain, pt has been having pain in the left elbow which radiates into the thumb and has also been noted on the R side. During the evaluation this date, pt's UE symptoms were not reproduced with cervical testing which indicates the pain is originating from the medial/lateral epicondyle vs the cervical spine. Pt demonstrates with increased pain on the left medial and lateral epicondyles and painful gripping and a + Finkelstein (DeQuervains tenosynovitis). Pt presents with signs and symptoms consistent with lateral epicondylitis along with some medial epicondyle irritation. Pt also demonstrates symptoms on the RUE, however, less irritable. At this time, pt will benefit from skilled therapy to address neck and BUE pain along with mobility issues in order to improve activity tolerance and QOL. Will address RUE symptoms pending signature from referring position. Problems:    [x] ? Pain: neck and LUE pain   [x] ?  ROM: decreased neck rotation and decreased flexibility in UEs  [x] ? Strength: decreased and painful  strength; decreased deep neck flexor endurance  [x] ? Function: UEFS: 32.5% impairment; NDI: 56% impairment       UEs Short term goals: MEET IN 8 VISITS Status   Pain: Pt will report less than or equal to 5-6/10 left elbow pain at worst throughout the day in order to improve tolerance to use of UEs for ADLs, housework, and lifting. Ongoing   ROM: Pt will demonstrate a negative Finkelstein test on BUE in order to improve ability to grasp the UEs with minimal discomfort. Ongoing   Strength: Pt will demonstrate ability to complete gripping tasks with less than or equal to 0-1/10 increase in left elbow pain in order to improve tolerance to ADLs. Ongoing   Home Exercise Program: Pt will demonstrate independence with HEP. Ongoing   Longer term goals: MEET IN 16 VISITS    Pain: Pt will report less than or equal to 3-4/10 left elbow pain at worst throughout the day in order to improve tolerance to use of UEs for ADLs, housework, and lifting. Ongoing   ROM: Pt will be able to demonstrate full AROM of the UEs with 0/10 pain in the medial and lateral elbow in order to improve use during ADLs and improve activity tolerance. Ongoing   Strength: Pt will demonstrate greater than or equal to 5/5 BUE strength with 0/10 elbow pain with testing and an improvement in left  strength by 5 lbs to improve tolerance to lifting, grasping, and ADL completion. Ongoing   Outcome Score: Pt will improve the UEFS score by greater than or equal to 10% in order to demonstrate an improvement in function. Ongoing     Neck STG: (to be met in 8 treatments)  1. ? Pain: Pt will report less than or equal to 6-7/10 neck pain at most throughout the day and a decrease in headaches by 25% in order to improve tolerance to ADL completion and improve QOL.   2. ? ROM: Pt will be able to demonstrate neck rotation to the R and left to greater than or equal to 60 degrees in order to assist scanning the environment and safe driving. 3. ? Strength: Pt will be able to complete the 10 head lifts off the table with initiating movement from deep neck flexors vs global neck flexors in order to demonstrate neuromuscular re-education and postural awareness/strenght. 4. ? Function: Pt will score less than or equal to 50% on the NDI in order to demonstrate improved function on ADLs. 5. Independent with Home Exercise Programs  LTG: (to be met in 16 treatments)  1. ? Pain: Pt will report less than or equal 4-5/10 neck pain at most throughout the day and a decrease in headaches by 50% in order to improve tolerance to ADL completion and improve QOL. 2. ? ROM: Pt will demonstrate cervical ROM to greater than or equal to 70 degrees rotation (B) and maintain for 10\" and flexion, extension, and SB without pain/reports of tightness in order to improve tolerance to driving and completing ADLs. 3. ? Strength: Pt will demonstrate ability to complete deep neck flexor endurance test for greater than or equal to 38\" without an increase in neck pain in order to demonstrate improved postural strength and control when completing ADLs. 4. ? Function: Pt will score less than or equal to 45% on the NDI in order to demonstrate improved function with ADLs.                    Patient goals: to feel better, strong without pain     Rehab Potential:  [] Good  [x] Fair  [] Poor   Suggested Professional Referral:  [] No  [x] Yes: further evaluation if symptoms do not resolve  Barriers to Goal Achievement[de-identified]  [] No  [x] Yes: chronicity of symptoms   Domestic Concerns:  [x] No  [] Yes:    Pt. Education:  [x] Plans/Goals, Risks/Benefits discussed  [x] Home exercise program  Method of Education: [x] Verbal  [x] Demo  [x] Written  - Provided exercises via handout and email  - Educated on postural awareness  - Educated on proper wearing of counter-force brace and to trial this positioning  Comprehension of Education:  [x] Joni Lucero understanding. [x] Demonstrates understanding. [x] Needs Review. [] Demonstrates/verbalizes understanding of HEP/Ed previously given. Treatment Plan:  [x] Therapeutic Exercise   64266  [] Iontophoresis: 4 mg/mL Dexamethasone Sodium Phosphate  mAmin  85994   [] Therapeutic Activity  00908 [] Vasopneumatic cold with compression  31083    [] Gait Training   51436 [] Ultrasound   52262   [x] Neuromuscular Re-education  84246 [] Electrical Stimulation Unattended  74805   [x] Manual Therapy  65587 [] Electrical Stimulation Attended  45809   [x] Instruction in HEP  [] Lumbar/Cervical Traction  58120   [] Aquatic Therapy   82039 [x] Cold/hotpack    [] Massage   90334      [] Dry Needling, 1 or 2 muscles  33047   [] Biofeedback, first 15 minutes   38750  [] Biofeedback, additional 15 minutes   19747 [] Dry Needling, 3 or more muscles  84574     []  Medication allergies reviewed for use of    Dexamethasone Sodium Phosphate 4mg/ml     with iontophoresis treatments. Pt is not allergic.     Frequency: 2 x/week for 16 visits    Todays Treatment:  Modalities:   Exercises:  Exercise Reps/ Time Weight/ Level Comments   Wrist flexion stretch x     Wrist extension stretch x     Finkelstein stretch x  Repeated vs sustained   Seated cervical retractions x     Postural education x     Other:    Specific Instructions for next treatment: initially focus on soft tissue mobilization of the cervical spine, ensure scapular mobility, and of the elbow/forearm/hand utilizing hawkgrips and MFR techniques; progress to deep neck flexor and postural strengthening and elbow, forearm, wrist,  strengthening/stretching - as symptoms become less diffuse- trial use of mobilizations to the elbow with gripping to improve  strength    Evaluation Complexity:  History (Personal factors, comorbidities) [] 0 [x] 1-2 [] 3+   Exam (limitations, restrictions) [] 1-2 [] 3 [x] 4+   Clinical presentation (progression) [] Stable [x] Evolving - neck pain resulting in headaches; left arm pain which is beginning on the R [] Unstable   Decision Making [] Low [x] Moderate [] High    [] Low Complexity [x] Moderate Complexity [] High Complexity         Treatment Charges: Mins Units   Evaluation  [] Low  [x] Mod  [] High ----- 1   []  Modalities     [x]  Ther Exercise 8 1   []  Manual Therapy     []  Ther Activities     []  Aquatics     []  Vasocompression     []  Other       Time in: 100    Time Out: 200    Electronically signed by: Pop Lynn PT        Physician Signature:________________________________Date:__________________  By signing above or cosigning this note, I have reviewed this plan of care and certify a need for medically necessary rehabilitation services.      *PLEASE SIGN ABOVE AND FAX BACK ALL PAGES*

## 2021-08-03 PROBLEM — F33.0 MILD EPISODE OF RECURRENT MAJOR DEPRESSIVE DISORDER (HCC): Status: ACTIVE | Noted: 2021-08-03

## 2021-08-03 NOTE — PLAN OF CARE
notes he has difficulty driving secondary to pain turning his head to the R and left. Pt also notes a constant buzzing in his ears which intensifies if he tightens his jaw. Pt also reports headaches that come and go every 2-3 days. Pt reports the pain is on his temples and can be in the back of his head as well. Pt notes the headaches go away on their own. Pt notes his pain is improved with chiropractic adjustments along with medication. Pt also reports having left elbow pain that has been worsening since 2015/2016. Pt reports he has also noticed the pain to be present on the R side at times. Pt states the pain starts on the inside of his elbow and radiates down to the top of his thumb. Pt denies any numbness or tingling but has noticed  strength deficits secondary to pain. Pt also reports having issues with dropping objects due to pain vs weakness. Pt reports this pain is an aching pain and can range from 0-7/8/10. Pt reports this pain is worsened with increased activity with his UEs and he cannot lift heavy objects due to the pain. Pt does not his left thumb pops a lot and it is noticed on the R as well. Pt reports she has difficulty falling asleep secondary to his neck and arm pain but does not wake due to pain. Pt denies any hx of neck surgeries or injections. Pt also reports having an EMG (2006) completed without any abnormal findings. Assessment: Eli Gill is a 46 y.o. male who is being seen for chronic neck pain and left elbow pain. Pt reports the left elbow pain has been ongoing for the last 5-6 years and is predominantly on the left, but can also occur on the R. Pt demonstrates with negative ligamentous testing of the cervical spine but does demonstrated altered neuromuscular control with utilization of gobal neck flexors vs deep neck flexors. Pt also demonstrates decreased mobility of the cervical and thoracic spine.  Along with chronic neck pain, pt has been having pain in the left elbow will be able to demonstrate full AROM of the UEs with 0/10 pain in the medial and lateral elbow in order to improve use during ADLs and improve activity tolerance. Ongoing   Strength: Pt will demonstrate greater than or equal to 5/5 BUE strength with 0/10 elbow pain with testing and an improvement in left  strength by 5 lbs to improve tolerance to lifting, grasping, and ADL completion. Ongoing   Outcome Score: Pt will improve the UEFS score by greater than or equal to 10% in order to demonstrate an improvement in function. Neck STG: (to be met in 8 treatments)  1. ? Pain: Pt will report less than or equal to 6-7/10 neck pain at most throughout the day and a decrease in headaches by 25% in order to improve tolerance to ADL completion and improve QOL. 2. ? ROM: Pt will be able to demonstrate neck rotation to the R and left to greater than or equal to 60 degrees in order to assist scanning the environment and safe driving. 3. ? Strength: Pt will be able to complete the 10 head lifts off the table with initiating movement from deep neck flexors vs global neck flexors in order to demonstrate neuromuscular re-education and postural awareness/strenght. 4. ? Function: Pt will score less than or equal to 50% on the NDI in order to demonstrate improved function on ADLs. 5. Independent with Home Exercise Programs  LTG: (to be met in 16 treatments)  1. ? Pain: Pt will report less than or equal 4-5/10 neck pain at most throughout the day and a decrease in headaches by 50% in order to improve tolerance to ADL completion and improve QOL. 2. ? ROM: Pt will demonstrate cervical ROM to greater than or equal to 70 degrees rotation (B) and maintain for 10\" and flexion, extension, and SB without pain/reports of tightness in order to improve tolerance to driving and completing ADLs.    3. ? Strength: Pt will demonstrate ability to complete deep neck flexor endurance test for greater than or equal to 38\" without an increase in neck pain in order to demonstrate improved postural strength and control when completing ADLs. 4. ? Function: Pt will score less than or equal to 45% on the NDI in order to demonstrate improved function with ADLs.                   Patient goals: to feel better, strong without pain    Rehab Potential:  []? Good  [x]? Fair  []? Poor    Suggested Professional Referral:  []? No  [x]? Yes: further evaluation if symptoms do not resolve  Barriers to Goal Achievement[de-identified]  []? No  [x]? Yes: chronicity of symptoms   Domestic Concerns:  [x]? No  []? Yes:     Treatment Plan:  [x]? Therapeutic Exercise   24343             []? Iontophoresis: 4 mg/mL Dexamethasone Sodium Phosphate  mAmin  60823   []? Therapeutic Activity  61687 []? Vasopneumatic cold with compression  [id number]               []? Gait Training    51295 []? Ultrasound        [id number]   [x]? Neuromuscular Re-education  [id number] []? Electrical Stimulation Unattended  85249  [facility]   [x]? Manual Therapy  65313 []? Electrical Stimulation Attended  [id number]   [x]? Instruction in HEP       []? Lumbar/Cervical Traction  [id number]   []? Aquatic Therapy           [id number] [x]? Cold/hotpack     []? Massage   36738      []? Dry Needling, 1 or 2 muscles  77386   []? Biofeedback, first 15 minutes   56763  []? Biofeedback, additional 15 minutes   48918 []? Dry Needling, 3 or more muscles  01739      []? Medication allergies reviewed for use of               Dexamethasone Sodium Phosphate 4mg/ml                with iontophoresis treatments. Pt is not allergic.     Frequency: 2 x/week for 16 visits      Electronically signed by: Sly Avalos PT        Physician Signature:________________________________Date:__________________  By signing above or cosigning this note, I have reviewed this plan of care and certify a need for medically necessary rehabilitation services.      *PLEASE SIGN ABOVE AND FAX BACK ALL PAGES*

## 2021-08-09 ENCOUNTER — HOSPITAL ENCOUNTER (OUTPATIENT)
Dept: PHYSICAL THERAPY | Facility: CLINIC | Age: 51
Setting detail: THERAPIES SERIES
Discharge: HOME | End: 2021-08-09
Payer: MEDICARE

## 2021-08-09 PROCEDURE — 97110 THERAPEUTIC EXERCISES: CPT

## 2021-08-09 PROCEDURE — 97140 MANUAL THERAPY 1/> REGIONS: CPT

## 2021-08-12 ENCOUNTER — HOSPITAL ENCOUNTER (OUTPATIENT)
Dept: PHYSICAL THERAPY | Facility: CLINIC | Age: 51
Setting detail: THERAPIES SERIES
Discharge: HOME | End: 2021-08-12
Payer: MEDICARE

## 2021-08-12 PROCEDURE — 97110 THERAPEUTIC EXERCISES: CPT

## 2021-08-12 PROCEDURE — 97140 MANUAL THERAPY 1/> REGIONS: CPT

## 2021-08-12 NOTE — FLOWSHEET NOTE
[] [facility]) [facility]  [address]  [phone]  F: [phone] [x] [address]  [facility] [address]   [address]  P: [phone]  F: [phone] [] [facility]  [address]  P: [phone]  F: [phone] [] [address]  P: [phone]  F: [phone] [] [address]  [facility]   [address]   [place]: [phone]  F: [phone]      Physical Therapy Daily Treatment Note    Date:  2021  Patient Name:  Sarah Browne    :  1970  MRN: [id number]  Physician: Izabella Caraballo MD                                    Insurance: [employer] (30 vs/ year)  Medical Diagnosis: M54.2, G89.29 (ICD-10-CM) - Chronic neck pain; M77.12 (ICD-10-CM) - Left lateral epicondylitis                       Rehab Codes: M54.2, M79.601, M79.602, M62.81  Onset Date: 21                 Next 's appt: 10/12/21  Visit# / total visits: 3     Cancels/No Shows: 0    Subjective:    Pain:  [x] Yes  [] No  Location: neck, L UE  Pain Rating: (0-10 scale) 4/10 Neck, 5/10 L UE (positional), not quantified /10 R wrist  Pain altered Tx:  [x] No  [] Yes  Action:    Comments: Pt arrives stating he is doing \"good but I am still suffering from the same things. \" Pt reports his neck stretches causes more tightness. Pt reports he did relief when making a fist after the hawkgrips. Pt reports he did go to his chiropractor yesterday and was re-adjusted which did help with his tightness from his stretches.     Objective:  Modalities:   Precautions:  Exercises: bolded completed 21 [employer] access code [email or web address]   Exercise Reps/ Time Weight/ Level Comments   UBE 2/ lv1    Manual/MFR wrist extensors 16'  Extensors of the LUE  Radial side of RUE   Manual- Occipital base release ---  No restrictions noted, pt with good relaxation         Seated:      Upper trap stretch --- ---    Levator stretch --- ---    Scapular retraction  10x 5\" hold    Cervical retraction  10x 5\" hold          Wrist flexion stretch 2x30\"   Reviewed 8/9   Wrist extension stretch 2x30\" ea   Reviewed 8/9   Finkelstein stretch x   Repeated vs sustained- reviewed 8/9   Eccentric wrist flexion 10x ea 5\" lowering  1#    Eccentric wrist extension 10x ea 5\" lowering  1#    Eccentric radial deviation 10x ea 5\" lowering  1# No weight RUE   Hammer supination/pronation 1' ea  Holding at mid shaft   Theraband flex bar 10x ea Red Elbows flexed  Elbows straight  Pain-free range   Gripper- white 10x ea  Elbow flexed at side         Standing:      Corner pec stretch 3x 30\" hold            Supine:      Chin tucks 10x 5\"    Deep neck flexion 2x5 3-5\" hold          Prone      Scapular retractions 10x5\"     Other:        Treatment Charges: Mins Units   []  Modalities     [x]  Ther Exercise 38 3   [x]  Manual Therapy 16 1   []  Ther Activities     []  Aquatics     []  Vasocompression     []  Other     Total Treatment time 54 4       Assessment: [x] Progressing toward goals. Pt denies an increase in neck or UE pain at the end of the session. Based on pt's reports of increased neck tightness following neck stretches, instability due to lack of neuromuscular control of the cervical musculature is possible. Due to these complaints, more active vs passive treatments will be completed. Pt with reports of tenderness along the extensors on the LUE and extensors of the left thumb. Hawkgrips with MFR were completed to increase muscle extensibility and function with good result. Pt denied pain with gripping tasks on the LUE following manual and less pain to palpation along the radial aspect of the thumb.  Implemented wrist strengthening with emphasis on eccentric loading with good execution, elbow pain at worst throughout the day in order to improve tolerance to use of UEs for ADLs, housework, and lifting. Ongoing   ROM: Pt will be able to demonstrate full AROM of the UEs with 0/10 pain in the medial and lateral elbow in order to improve use during ADLs and improve activity tolerance. Ongoing   Strength: Pt will demonstrate greater than or equal to 5/5 BUE strength with 0/10 elbow pain with testing and an improvement in left  strength by 5 lbs to improve tolerance to lifting, grasping, and ADL completion. Ongoing   Outcome Score: Pt will improve the UEFS score by greater than or equal to 10% in order to demonstrate an improvement in function. Ongoing      Neck STG: (to be met in 8 treatments)  1. ? Pain: Pt will report less than or equal to 6-7/10 neck pain at most throughout the day and a decrease in headaches by 25% in order to improve tolerance to ADL completion and improve QOL. 2. ? ROM: Pt will be able to demonstrate neck rotation to the R and left to greater than or equal to 60 degrees in order to assist scanning the environment and safe driving. 3. ? Strength: Pt will be able to complete the 10 head lifts off the table with initiating movement from deep neck flexors vs global neck flexors in order to demonstrate neuromuscular re-education and postural awareness/strenght. 4. ? Function: Pt will score less than or equal to 50% on the NDI in order to demonstrate improved function on ADLs. 5. Independent with Home Exercise Programs  LTG: (to be met in 16 treatments)  1. ? Pain: Pt will report less than or equal 4-5/10 neck pain at most throughout the day and a decrease in headaches by 50% in order to improve tolerance to ADL completion and improve QOL.    2. ? ROM: Pt will demonstrate cervical ROM to greater than or equal to 70 degrees rotation (B) and maintain for 10\" and flexion, extension, and SB without pain/reports of tightness in order to improve tolerance to driving and completing ADLs.   3. ? Strength: Pt will demonstrate ability to complete deep neck flexor endurance test for greater than or equal to 38\" without an increase in neck pain in order to demonstrate improved postural strength and control when completing ADLs. 4. ? Function: Pt will score less than or equal to 45% on the NDI in order to demonstrate improved function with ADLs.                   Patient goals: to feel better, strong without pain       Pt. Education:  [x] Yes  [] No  [x] Reviewed Prior HEP/Ed  Method of Education: [x] Verbal  [x] Demo  [x] Written- reviewed and updated [employer] HEP under current access code [email or web address]    Comprehension of Education:  [x] Verbalizes understanding. [x] Demonstrates understanding. [x] Needs review. [x] Demonstrates/verbalizes HEP/Ed previously given. Resent email of HEP through [employer] per pt request, as pt states he did not get it last time. Plan: [x] Continue current frequency toward long and short term goals.     [x] Specific Instructions for subsequent treatments: ensure scapular mobility, and of the elbow/forearm/hand utilizing hawkgrips and MFR techniques; progress to deep neck flexor and postural strengthening and elbow, forearm, wrist,  strengthening/stretching - as symptoms become less diffuse- trial use of mobilizations to the elbow with gripping to improve  strength      Time In: 300pm             Time Out: 402 pm     Electronically signed by:  Drew Timmons, PT

## 2021-08-16 ENCOUNTER — HOSPITAL ENCOUNTER (OUTPATIENT)
Dept: PHYSICAL THERAPY | Facility: CLINIC | Age: 51
Setting detail: THERAPIES SERIES
Discharge: HOME | End: 2021-08-16
Payer: MEDICARE

## 2021-08-16 PROCEDURE — 97140 MANUAL THERAPY 1/> REGIONS: CPT

## 2021-08-16 PROCEDURE — 97110 THERAPEUTIC EXERCISES: CPT

## 2021-08-16 NOTE — FLOWSHEET NOTE
restrictions noted, pt with good relaxation         Seated:      Upper trap stretch --- ---    Levator stretch --- ---    Scapular retraction  10x 5\" hold    Cervical retraction  10x 5\" hold          Wrist flexion stretch 2x30\"   Reviewed 8/9   Wrist extension stretch 2x30\" ea   Reviewed 8/9   Finkelstein stretch x   Repeated vs sustained- reviewed 8/9   Eccentric wrist flexion 10x ea 5\" lowering  1#    Eccentric wrist extension 10x ea 5\" lowering  1#    Eccentric radial deviation 10x ea 5\" lowering  1#    Hammer supination/pronation 1' ea  Holding at mid shaft   Theraband flex bar 3x10 ea Red Elbows flexed  Elbows straight  Pain-free range   Gripper- white 10x ea  Elbow flexed at side         Standing:      Corner pec stretch 3x 30\" hold            Supine:      Chin tucks 10x 5\"    Deep neck flexion 2x5 3-5\" hold          Prone      Scapular retractions 10x5\"     Other:        Treatment Charges: Mins Units   []  Modalities     [x]  Ther Exercise 24 1   [x]  Manual Therapy 28 2   []  Ther Activities     []  Aquatics     []  Vasocompression     []  Other     Total Treatment time 52 3       Assessment: [x] Progressing toward goals. Pt arrives with greater pain on the R hand, in the anatomical snuff box, in comparison to the LUE. Manual therapy to the extensor aspect of the RUE was completed with attention to the anatomical snuff box, APL, and EPL with good result. Pt able to complete wrist extension and ulnar deviation with a decrease in pain. Pt with pain noted with increased intensity of gripping along the ECRB and lateral malleolus. Manual therapy was completed to this area with good resolve of pain with gripping. Continued to complete eccentric wrist strengthening along with gripping tasks with good tolerance on the left, however, increased pain was noted with theraband flex bar. Pt did note an improvement in pain with increased reps.  Due to continued pain along the APL and EPL, Ktape was applied to off load the tendons during gripping tasks. Re-assurance provided to pt on the benefits of completing his exercises and stretches even when painful to help with mobility. [] No change. [x] Other: pt will benefit from more active treatments vs passive treatments. [x] Patient would continue to benefit from skilled physical therapy services in order to: address neck and BUE pain along with mobility issues in order to improve activity tolerance and QOL. Problems:    [x]? ? Pain: neck and LUE pain   [x]? ? ROM: decreased neck rotation and decreased flexibility in UEs  [x]? ? Strength: decreased and painful  strength; decreased deep neck flexor endurance  [x]? ? Function: UEFS: 32.5% impairment; NDI: 56% impairment         UEs Short term goals: MEET IN 8 VISITS Status   Pain: Pt will report less than or equal to 5-6/10 left elbow pain at worst throughout the day in order to improve tolerance to use of UEs for ADLs, housework, and lifting. Ongoing   ROM: Pt will demonstrate a negative Finkelstein test on BUE in order to improve ability to grasp the UEs with minimal discomfort. Ongoing   Strength: Pt will demonstrate ability to complete gripping tasks with less than or equal to 0-1/10 increase in left elbow pain in order to improve tolerance to ADLs. Ongoing   Home Exercise Program: Pt will demonstrate independence with HEP. Ongoing   Longer term goals: MEET IN 16 VISITS     Pain: Pt will report less than or equal to 3-4/10 left elbow pain at worst throughout the day in order to improve tolerance to use of UEs for ADLs, housework, and lifting. Ongoing   ROM: Pt will be able to demonstrate full AROM of the UEs with 0/10 pain in the medial and lateral elbow in order to improve use during ADLs and improve activity tolerance.   Ongoing   Strength: Pt will demonstrate greater than or equal to 5/5 BUE strength with 0/10 elbow pain with testing and an improvement in left  strength by 5 lbs to improve tolerance to lifting, grasping, and ADL completion. Ongoing   Outcome Score: Pt will improve the UEFS score by greater than or equal to 10% in order to demonstrate an improvement in function. Ongoing      Neck STG: (to be met in 8 treatments)  1. ? Pain: Pt will report less than or equal to 6-7/10 neck pain at most throughout the day and a decrease in headaches by 25% in order to improve tolerance to ADL completion and improve QOL. 2. ? ROM: Pt will be able to demonstrate neck rotation to the R and left to greater than or equal to 60 degrees in order to assist scanning the environment and safe driving. 3. ? Strength: Pt will be able to complete the 10 head lifts off the table with initiating movement from deep neck flexors vs global neck flexors in order to demonstrate neuromuscular re-education and postural awareness/strenght. 4. ? Function: Pt will score less than or equal to 50% on the NDI in order to demonstrate improved function on ADLs. 5. Independent with Home Exercise Programs  LTG: (to be met in 16 treatments)  1. ? Pain: Pt will report less than or equal 4-5/10 neck pain at most throughout the day and a decrease in headaches by 50% in order to improve tolerance to ADL completion and improve QOL. 2. ? ROM: Pt will demonstrate cervical ROM to greater than or equal to 70 degrees rotation (B) and maintain for 10\" and flexion, extension, and SB without pain/reports of tightness in order to improve tolerance to driving and completing ADLs. 3. ? Strength: Pt will demonstrate ability to complete deep neck flexor endurance test for greater than or equal to 38\" without an increase in neck pain in order to demonstrate improved postural strength and control when completing ADLs. 4. ? Function: Pt will score less than or equal to 45% on the NDI in order to demonstrate improved function with ADLs.                   Patient goals: to feel better, strong without pain       Pt.  Education:  [x] Yes  [] No  [x] Reviewed Prior HEP/Ed  Method of Education: [x] Verbal  [] Demo  [] Written- reviewed and updated [employer] HEP under current access code [email or web address]    Comprehension of Education:  [x] Verbalizes understanding. [x] Demonstrates understanding. [x] Needs review. [x] Demonstrates/verbalizes HEP/Ed previously given. Resent email of HEP through [employer] per pt request, as pt states he did not get it last time. Plan: [x] Continue current frequency toward long and short term goals.     [x] Specific Instructions for subsequent treatments: ensure scapular mobility, and of the elbow/forearm/hand utilizing hawkgrips and MFR techniques; progress to deep neck flexor and postural strengthening and elbow, forearm, wrist,  strengthening/stretching - as symptoms become less diffuse- trial use of mobilizations to the elbow with gripping to improve  strength      Time In: 200pm             Time Out: 301 pm     Electronically signed by:  Juan Khan, PT

## 2021-08-19 ENCOUNTER — HOSPITAL ENCOUNTER (OUTPATIENT)
Dept: PHYSICAL THERAPY | Facility: CLINIC | Age: 51
Setting detail: THERAPIES SERIES
Discharge: HOME | End: 2021-08-19
Payer: MEDICARE

## 2021-08-19 PROCEDURE — 97140 MANUAL THERAPY 1/> REGIONS: CPT

## 2021-08-19 PROCEDURE — 97110 THERAPEUTIC EXERCISES: CPT

## 2021-08-19 NOTE — FLOWSHEET NOTE
[] [facility]) [facility] &  Therapy  [address]  [phone]  F: [phone] [x] [address]  [place] [address]   [address]  P: [phone]  F: [phone] [] [place]  [address]  P: [phone]  F: [phone] [] [address]  P: [phone]  F: [phone] [] [address]  [facility]   [address]   [place]: [phone]  F: [phone]      Physical Therapy Daily Treatment Note    Date:  2021  Patient Name:  Nidhi Encarnacion    :  1970  MRN: [id number]  Physician: Leyla Crews MD                                    Insurance: [employer] (30 vs/lucas year)  Medical Diagnosis: M54.2, G89.29 (ICD-10-CM) - Chronic neck pain; M77.12 (ICD-10-CM) - Left lateral epicondylitis                       Rehab Codes: M54.2, M79.601, M79.602, M62.81  Onset Date: 21                 Next 's appt: 10/12/21  Visit# / total visits:      Cancels/No Shows: 0    Subjective:    Pain:  [x] Yes  [] No  Location: neck, L UE  Pain Rating: (0-10 scale) 5-6/10 Neck, 3-410 L UE (), 4/10 R wrist  Pain altered Tx:  [x] No  [] Yes  Action:    Comments: Pt reports the R hand is getting worse. Pt reports he put some sort of cream on it to help with the pain. Pt reports before donning the cream it was an 8/10. Pt notes the tape did not helped after the last session and fell off the same day. Pt states the left hand is painful with . Pt reports he has not been completing his HEP for fear of increasing the left arm pain. Pt reports he feels the R arm pain is being worsened by the exercises and didn't want the same thing to happen to the left.  Pt states he does have a wrist brace at home and wore it about 2 days ago but that is it. Objective:  Modalities: ice massage to R wrist 3'  Precautions:  Exercises: bolded completed 08/19/21 [employer] access code [email or web address]   Exercise Reps/ Time Weight/ Level Comments   UBE 2/2 lv1    Manual/MFR wrist extensors 14'  Extensors of the LUE  Radial side of RUE- cross friction massage over the tendons and palmar/dorsal glides to the [facility] and DIP joint; wrist flexion/extension/RD/UD with distraction   Manual- Occipital base release ---  No restrictions noted, pt with good relaxation         Seated:      Upper trap stretch --- ---    Levator stretch --- ---    Scapular retraction  10x 5\" hold    Cervical retraction  10x 5\" hold    Thumb extension and abduction isometrics 10x10\"  5 completed with therapist and 5 completed by patient    Wrist flexion stretch 2x30\"   Reviewed 8/9   Wrist extension stretch 2x30\" ea   Reviewed 8/9   Finkelstein stretch x   Repeated vs sustained- reviewed 8/9   Eccentric wrist flexion 10x ea  5\" lowering  1#R  2#L    Eccentric wrist extension 10x ea  5\" lowering 1#R  2#L    Eccentric radial deviation 10x ea 5\" lowering  2#L  0#R    Hammer supination/pronation 1' ea  Holding at mid shaft   Theraband flex bar 3x10 ea Red Elbows flexed  Elbows straight  Pain-free range   Gripper- white 10x2 ea  Elbow flexed at side         Standing:      Corner pec stretch 3x 30\" hold    Wall angels 10x       Supine:      Chin tucks 10x 5\"    Deep neck flexion 2x5 3-5\" hold          Prone      Scapular retractions 10x5\"     Other:        Treatment Charges: Mins Units   [x]  Modalities- ice massage  3' 0   [x]  Ther Exercise 37 2   [x]  Manual Therapy 14 1   []  Ther Activities     []  Aquatics     []  Vasocompression     []  Other     Total Treatment time 51 3       Assessment: [] Progressing toward goals. [] No change. [x] Other: Pt continues to arrive with pain in BUE with an increase in RUE pain.  Pt with inconsistent HEP completion along with inconsistent use of bracing for hte R wrist. Time was spent educating the pt on the need to be consistent with bracing of the RUE in order to assist with decreasing tendon inflammation. Pt donned a topical ointment prior to therapy which decreased his RUE pain. Cross-friction massage and joint mobilizations were the focus of manual treatment with good tolerance. implemented isometrics to thumb extension and abduction to improve muscle activation tolerance. Pt is advised to complete within a pain-free range and 5 reps were completed by therapist and 5 reps completed by patient to demonstrate understanding and completion of exercise. Ice massage to the RUE was completed with good resolution of pain and pt advised on completion at home. Pt continues to respond appropriately to manual therapy to the LUE which improves pain during gripping activities. Able to progress wrist strengthening tasks and no complaints of pain with gripping tasks were reported. Ended session with postural exercise in standing along with pt verbally reviewing exercises he needs to continue to complete at home. Pt voices concern with completing exercises and exacerbating symptoms, however, pt was encouraged and educated to remain consistent on exercise completion within a pain-free range. [x] Patient would continue to benefit from skilled physical therapy services in order to: address neck and BUE pain along with mobility issues in order to improve activity tolerance and QOL. Problems:    [x]? ? Pain: neck and LUE pain   [x]? ? ROM: decreased neck rotation and decreased flexibility in UEs  [x]? ? Strength: decreased and painful  strength; decreased deep neck flexor endurance  [x]? ? Function: UEFS: 32.5% impairment; NDI: 56% impairment         UEs Short term goals: MEET IN 8 VISITS Status   Pain: Pt will report less than or equal to 5-6/10 left elbow pain at worst throughout the day in order to improve tolerance to use of UEs for ADLs, housework, and lifting.  Ongoing ROM: Pt will demonstrate a negative Finkelstein test on BUE in order to improve ability to grasp the UEs with minimal discomfort. Ongoing   Strength: Pt will demonstrate ability to complete gripping tasks with less than or equal to 0-1/10 increase in left elbow pain in order to improve tolerance to ADLs. Ongoing   Home Exercise Program: Pt will demonstrate independence with HEP. Ongoing   Longer term goals: MEET IN 16 VISITS     Pain: Pt will report less than or equal to 3-4/10 left elbow pain at worst throughout the day in order to improve tolerance to use of UEs for ADLs, housework, and lifting. Ongoing   ROM: Pt will be able to demonstrate full AROM of the UEs with 0/10 pain in the medial and lateral elbow in order to improve use during ADLs and improve activity tolerance. Ongoing   Strength: Pt will demonstrate greater than or equal to 5/5 BUE strength with 0/10 elbow pain with testing and an improvement in left  strength by 5 lbs to improve tolerance to lifting, grasping, and ADL completion. Ongoing   Outcome Score: Pt will improve the UEFS score by greater than or equal to 10% in order to demonstrate an improvement in function. Ongoing      Neck STG: (to be met in 8 treatments)  1. ? Pain: Pt will report less than or equal to 6-7/10 neck pain at most throughout the day and a decrease in headaches by 25% in order to improve tolerance to ADL completion and improve QOL. 2. ? ROM: Pt will be able to demonstrate neck rotation to the R and left to greater than or equal to 60 degrees in order to assist scanning the environment and safe driving. 3. ? Strength: Pt will be able to complete the 10 head lifts off the table with initiating movement from deep neck flexors vs global neck flexors in order to demonstrate neuromuscular re-education and postural awareness/strenght. 4. ? Function: Pt will score less than or equal to 50% on the NDI in order to demonstrate improved function on ADLs.   5. Independent with Home Exercise Programs  LTG: (to be met in 16 treatments)  1. ? Pain: Pt will report less than or equal 4-5/10 neck pain at most throughout the day and a decrease in headaches by 50% in order to improve tolerance to ADL completion and improve QOL. 2. ? ROM: Pt will demonstrate cervical ROM to greater than or equal to 70 degrees rotation (B) and maintain for 10\" and flexion, extension, and SB without pain/reports of tightness in order to improve tolerance to driving and completing ADLs. 3. ? Strength: Pt will demonstrate ability to complete deep neck flexor endurance test for greater than or equal to 38\" without an increase in neck pain in order to demonstrate improved postural strength and control when completing ADLs. 4. ? Function: Pt will score less than or equal to 45% on the NDI in order to demonstrate improved function with ADLs.                   Patient goals: to feel better, strong without pain       Pt. Education:  [x] Yes  [] No  [x] Reviewed Prior HEP/Ed  Method of Education: [x] Verbal  [] Demo  [x] Written- reviewed and updated "[employer] HEP under current access code [email or web address]    Comprehension of Education:  [x] Verbalizes understanding. [x] Demonstrates understanding. [x] Needs review. [x] Demonstrates/verbalizes HEP/Ed previously given. Resent email of HEP through [employer] per pt request, as pt states he did not get it last time. Plan: [x] Continue current frequency toward long and short term goals.     [x] Specific Instructions for subsequent treatments: ensure scapular mobility, and of the elbow/forearm/hand utilizing hawkgrips and MFR techniques; progress to deep neck flexor and postural strengthening and elbow, forearm, wrist,  strengthening/stretching - as symptoms become less diffuse- trial use of mobilizations to the elbow with gripping to improve  strength      Time In: 200pm             Time Out: 258 pm     Electronically signed by:  Karo Beltran, PT

## 2021-08-23 ENCOUNTER — HOSPITAL ENCOUNTER (OUTPATIENT)
Dept: PHYSICAL THERAPY | Facility: CLINIC | Age: 51
Setting detail: THERAPIES SERIES
Discharge: HOME | End: 2021-08-23
Payer: MEDICARE

## 2021-08-23 PROCEDURE — 97110 THERAPEUTIC EXERCISES: CPT

## 2021-08-23 PROCEDURE — 97140 MANUAL THERAPY 1/> REGIONS: CPT

## 2021-08-23 NOTE — FLOWSHEET NOTE
[] [facility]) [facility] &  Therapy  [address]  [phone]  F: [phone] [x] [address]  [facility]   [address]  P: [phone]  F: [phone] [] [place]  [address]  P: [phone]  F: [phone] [] [address]  P: [phone]  F: [phone] [] [address]  [facility]   [address]   [place]: [phone]  F: [phone]      Physical Therapy Daily Treatment Note    Date:  2021  Patient Name:  Merissa Ruiz    :  1970  MRN: [id number]  Physician: Jose Parson MD                                    Insurance: [employer] (30 vs/ year)  Medical Diagnosis: M54.2, G89.29 (ICD-10-CM) - Chronic neck pain; M77.12 (ICD-10-CM) - Left lateral epicondylitis                       Rehab Codes: M54.2, M79.601, M79.602, M62.81  Onset Date: 21                 Next 's appt: 10/12/21  Visit# / total visits:      Cancels/No Shows: 0    Subjective:    Pain:  [x] Yes  [] No Location: neck, L UE  Pain Rating: (0-10 scale) 6-7/10 Neck, 5-6/10 L UE (), 7-8/10 R wrist  Pain altered Tx:  [x] No  [] Yes  Action:    Comments: Pt reports that he felt good upon completion of last treatment, however the following morning it was like he never had PT interventions as his pain was returned. Pt continues to state that R wrist at thumb extensor is his greatest source of pain, describes his neck pain to feel like a \"pressure\", and his L UE to be bothersome with gripping. During treatment pt also reports that his R flank gets occasional sharp pains, and that he has a history of difficulty swalling which has been addressed with a swallow study.  Pt wonders if his neck pain is related to these session with warm up on UBE, during which educated pt on his posture how it effects his neck and trunk. Pt admits that it is hard for him to stay in good posture as he becomes fatigued. Completed MFR to UE's in sitting, gave pt lumbar roll to aid in improving his posture. Throughout treatment reinforced importance of posture and cued frequently to correct. Added MFR to cervical paraspinals in prone this date, however no relief of \"pressure\" complaint reported. Upon completion of manual and stretching and exercises pt states his UE complaints to be improved to 10% on the R and 50% on the L.     [] Other:   [x] Patient would continue to benefit from skilled physical therapy services in order to: address neck and BUE pain along with mobility issues in order to improve activity tolerance and QOL. Problems:    [x]? ? Pain: neck and LUE pain   [x]? ? ROM: decreased neck rotation and decreased flexibility in UEs  [x]? ? Strength: decreased and painful  strength; decreased deep neck flexor endurance  [x]? ? Function: UEFS: 32.5% impairment; NDI: 56% impairment         UEs Short term goals: MEET IN 8 VISITS Status   Pain: Pt will report less than or equal to 5-6/10 left elbow pain at worst throughout the day in order to improve tolerance to use of UEs for ADLs, housework, and lifting. Ongoing   ROM: Pt will demonstrate a negative Finkelstein test on BUE in order to improve ability to grasp the UEs with minimal discomfort. Ongoing   Strength: Pt will demonstrate ability to complete gripping tasks with less than or equal to 0-1/10 increase in left elbow pain in order to improve tolerance to ADLs. Ongoing   Home Exercise Program: Pt will demonstrate independence with HEP. Ongoing   Longer term goals: MEET IN 16 VISITS     Pain: Pt will report less than or equal to 3-4/10 left elbow pain at worst throughout the day in order to improve tolerance to use of UEs for ADLs, housework, and lifting.  Ongoing   ROM: Pt will be increase in neck pain in order to demonstrate improved postural strength and control when completing ADLs. 4. ? Function: Pt will score less than or equal to 45% on the NDI in order to demonstrate improved function with ADLs.                   Patient goals: to feel better, strong without pain       Pt. Education:  [x] Yes  [] No  [x] Reviewed Prior HEP/Ed  Method of Education: [] Verbal  [] Demo  [] Written   Comprehension of Education:  [] Verbalizes understanding. [] Demonstrates understanding. [] Needs review. [x] Demonstrates/verbalizes HEP/Ed previously given. Pt completes HEP 2x/day    Plan: [x] Continue current frequency toward long and short term goals.     [x] Specific Instructions for subsequent treatments: ensure scapular mobility, and of the elbow/forearm/hand utilizing hawkgrips and MFR techniques; progress to deep neck flexor and postural strengthening and elbow, forearm, wrist,  strengthening/stretching - as symptoms become less diffuse- trial use of mobilizations to the elbow with gripping to improve  strength      Time In: 2:32pm             Time Out: 3:37pm     Electronically signed by:  Tonya Prajapati PTA

## 2021-08-26 ENCOUNTER — HOSPITAL ENCOUNTER (OUTPATIENT)
Dept: PHYSICAL THERAPY | Facility: CLINIC | Age: 51
Setting detail: THERAPIES SERIES
Discharge: HOME | End: 2021-08-26
Payer: MEDICARE

## 2021-08-26 PROCEDURE — 97112 NEUROMUSCULAR REEDUCATION: CPT

## 2021-08-26 PROCEDURE — 97110 THERAPEUTIC EXERCISES: CPT

## 2021-08-26 NOTE — FLOWSHEET NOTE
[] [facility])  CENTER &  Therapy  [address]  [phone]  F: [phone] [x] [address]  [facility]   [address]  P: [phone]  F: [phone] [] [place]  [address]  P: [phone]  F: [phone] [] [address]  P: [phone]  F: [phone] [] [address]  [facility]   [address]   [place]: [phone]  F: [phone]      Physical Therapy Daily Treatment Note    Date:  2021  Patient Name:  Nga Rojas    :  1970  MRN: [id number]  Physician: Temitope Diaz MD                                    Insurance: [employer] ( vs/ year)  Medical Diagnosis: M54.2, G89.29 (ICD-10-CM) - Chronic neck pain; M77.12 (ICD-10-CM) - Left lateral epicondylitis                       Rehab Codes: M54.2, M79.601, M79.602, M62.81  Onset Date: 21                 Next 's appt: 10/12/21  Visit# / total visits:      Cancels/No Shows: 0    Subjective:    Pain:  [x] Yes  [] No Location: neck, L UE  Pain Rating: (0-10 scale) 1-2/10 Neck, 1-2/10 L UE (), 7-8/10 R wrist  Pain altered Tx:  [x] No  [] Yes  Action:    Comments: Pt with reports of the pressure dissipating in his neck the following day after therapy. Pt notes his neck muscles are so weak and he needs to work on strengthening them. Pt reports he does not have a hx of RA. Pt reports his weakness is constant and pain is dependent on activity.      Objective:  Modalities: ice massage to R wrist 3'- not 21 as pt is doing this at home multiple times a day  Precautions:  Exercises: bolded completed 21 [employer] access code [email or web address]   Exercise Reps/ Time Weight/ Level Comments   UBE 3/3 lv1    Manual/MFR wrist extensors  Extensors of the LUE  Radial side of RUE- cross friction massage over the tendons and palmar/dorsal glides to the [facility] and DIP joint; wrist flexion/extension/RD/UD with distraction; MFR to cervical paraspinals/occipital base in prone   Manual- Occipital base release ---  No restrictions noted, pt with good relaxation         Seated:      Upper trap stretch --- ---    Levator stretch --- ---    Scapular retraction  10x 5\" hold    Cervical retraction  10x 5\" hold    Thumb extension and abduction isometrics 10x10\"  5 completed with therapist and 5 completed by patient    Wrist flexion stretch 2x30\"   Reviewed 8/9   Wrist extension stretch 2x30\" ea   Reviewed 8/9   Finkelstein stretch x   Repeated vs sustained- reviewed 8/9   Eccentric wrist flexion 10x ea  5\" lowering  1#R  2#L    Eccentric wrist extension 10x ea  5\" lowering 1#R  2#L    Eccentric radial deviation 10x ea 5\" lowering  2#L  0#R    Hammer supination/pronation 1' ea  Holding at mid shaft   Theraband flex bar 3x10 ea Red Elbows flexed  Elbows straight  Pain-free range   Gripper- white 10x2 ea  Elbow flexed at side         Standing:      Corner pec stretch 3x 30\" hold    Wall push ups 10x     Serratus wall slides 10x     Wall angels 10x       Supine:      Chin tucks 10x 5\"    Deep neck flexion 2x5 3-5\" hold    No sirs 1'     Yes sirs 2x30\"     Resisted H. abd with chin tuck 10x Orange loop    Shoulder flexion with chin tuck 2x5 Orange loop    Prone      Scapular retractions 10x5\"     Shoulder extension 6x6\"     H. abd 6x6\"     W's  6x6\"     Prone chin tucks 10X     Prone cervical extension 2x5     Other:        Treatment Charges: Mins Units   []  Modalities- ice massage      [x]  Ther Exercise 30 2   []  Manual Therapy     []  Ther Activities     []  Aquatics     []  Vasocompression     [x]  Neuro 20 1   Total Treatment time 50 3       Assessment: [] Progressing toward goals. [x] No change.  Session this date focused on cervical spine and postural muscle strengthening. Pt with notable fatigue of the deep neck flexor musculature and weakness is noted when completing chin tucks with head lift. Step by step sequencing is provided during tasks this date to ensure proper hold times and proper timing of movements. When completing sustained chin tuck with flexion and extension, pt demonstrates aberrant movements as pt fatigues. Rest breaks are provided throughout the session in order to promote muscle recovery secondary to fatigue and altered movement patterns. Pt with increased cueing to complete prone chin tucks and prone cervical extension with appropriate degrees of movement and to avoid extension of the upper cervical spine. Pt demonstrates ability to complete standing tasks and cueing for appropriate hand placement during push ups is provided along with cueing to engage abdominal musculature to promote core strength during all tasks. Updated pt's HEP and provided pt with education on DOMS. [] Other:   [x] Patient would continue to benefit from skilled physical therapy services in order to: address neck and BUE pain along with mobility issues in order to improve activity tolerance and QOL. Problems:    [x]? ? Pain: neck and LUE pain   [x]? ? ROM: decreased neck rotation and decreased flexibility in UEs  [x]? ? Strength: decreased and painful  strength; decreased deep neck flexor endurance  [x]? ? Function: UEFS: 32.5% impairment; NDI: 56% impairment         UEs Short term goals: MEET IN 8 VISITS Status   Pain: Pt will report less than or equal to 5-6/10 left elbow pain at worst throughout the day in order to improve tolerance to use of UEs for ADLs, housework, and lifting. Ongoing   ROM: Pt will demonstrate a negative Finkelstein test on BUE in order to improve ability to grasp the UEs with minimal discomfort.   Ongoing   Strength: Pt will demonstrate ability to complete gripping tasks with less than or equal to 0-1/10 increase in left elbow pain in order to improve tolerance to ADLs. Ongoing   Home Exercise Program: Pt will demonstrate independence with HEP. Ongoing   Longer term goals: MEET IN 16 VISITS     Pain: Pt will report less than or equal to 3-4/10 left elbow pain at worst throughout the day in order to improve tolerance to use of UEs for ADLs, housework, and lifting. Ongoing   ROM: Pt will be able to demonstrate full AROM of the UEs with 0/10 pain in the medial and lateral elbow in order to improve use during ADLs and improve activity tolerance. Ongoing   Strength: Pt will demonstrate greater than or equal to 5/5 BUE strength with 0/10 elbow pain with testing and an improvement in left  strength by 5 lbs to improve tolerance to lifting, grasping, and ADL completion. Ongoing   Outcome Score: Pt will improve the UEFS score by greater than or equal to 10% in order to demonstrate an improvement in function. Ongoing      Neck STG: (to be met in 8 treatments)  1. ? Pain: Pt will report less than or equal to 6-7/10 neck pain at most throughout the day and a decrease in headaches by 25% in order to improve tolerance to ADL completion and improve QOL. 2. ? ROM: Pt will be able to demonstrate neck rotation to the R and left to greater than or equal to 60 degrees in order to assist scanning the environment and safe driving. 3. ? Strength: Pt will be able to complete the 10 head lifts off the table with initiating movement from deep neck flexors vs global neck flexors in order to demonstrate neuromuscular re-education and postural awareness/strenght. 4. ? Function: Pt will score less than or equal to 50% on the NDI in order to demonstrate improved function on ADLs. 5. Independent with Home Exercise Programs  LTG: (to be met in 16 treatments)  1. ? Pain: Pt will report less than or equal 4-5/10 neck pain at most throughout the day and a decrease in headaches by 50% in order to improve tolerance to ADL completion and improve QOL.    2. ? ROM: Pt will demonstrate cervical ROM to greater than or equal to 70 degrees rotation (B) and maintain for 10\" and flexion, extension, and SB without pain/reports of tightness in order to improve tolerance to driving and completing ADLs. 3. ? Strength: Pt will demonstrate ability to complete deep neck flexor endurance test for greater than or equal to 38\" without an increase in neck pain in order to demonstrate improved postural strength and control when completing ADLs. 4. ? Function: Pt will score less than or equal to 45% on the NDI in order to demonstrate improved function with ADLs.                   Patient goals: to feel better, strong without pain       Pt. Education:  [x] Yes  [] No  [x] Reviewed Prior HEP/Ed  Method of Education: [x] Verbal  [x] Demo  [x] Written  8/26/21: prone scapular retractions; prone extension, prone h. Abd, prone Ws  Comprehension of Education:  [x] Verbalizes understanding. [x] Demonstrates understanding. [x] Needs review. [x] Demonstrates/verbalizes HEP/Ed previously given. Pt completes HEP 2x/day    Plan: [x] Continue current frequency toward long and short term goals.     [x] Specific Instructions for subsequent treatments: ensure scapular mobility, and of the elbow/forearm/hand utilizing hawkgrips and MFR techniques; progress to deep neck flexor and postural strengthening and elbow, forearm, wrist,  strengthening/stretching - as symptoms become less diffuse- trial use of mobilizations to the elbow with gripping to improve  strength      Time In: 300 pm             Time Out: 400 pm     Electronically signed by:  Carla Wesley PT

## 2021-08-30 ENCOUNTER — HOSPITAL ENCOUNTER (OUTPATIENT)
Dept: PHYSICAL THERAPY | Facility: CLINIC | Age: 51
Setting detail: THERAPIES SERIES
Discharge: HOME | End: 2021-08-30
Payer: MEDICARE

## 2021-08-30 PROCEDURE — 97112 NEUROMUSCULAR REEDUCATION: CPT

## 2021-08-30 PROCEDURE — 97110 THERAPEUTIC EXERCISES: CPT

## 2021-08-30 NOTE — FLOWSHEET NOTE
[] [facility]) [facility] &  Therapy  [address]  [phone]  F: [phone] [x] [address]  [address]   [address]  P: [phone]  F: [phone] [] [place]  [address]  P: [phone]  F: [phone] [] [address]  P: [phone]  F: [phone] [] [address]  [facility]   [address]   [place]: [phone]  F: [phone]      Physical Therapy Daily Treatment Note    Date:  2021  Patient Name:  Chuck Mane    :  1970  MRN: [id number]  Physician: Ana Cristina Lee MD                                    Insurance: [place] Advantage (30 vs/ year)  Medical Diagnosis: M54.2, G89.29 (ICD-10-CM) - Chronic neck pain; M77.12 (ICD-10-CM) - Left lateral epicondylitis                       Rehab Codes: M54.2, M79.601, M79.602, M62.81  Onset Date: 21                 Next 's appt: 10/12/21  Visit# / total visits:      Cancels/No Shows: 0    Subjective:    Pain:  [x] Yes  [] No Location: neck, L UE  Pain Rating: (0-10 scale) 1/10 Neck, 2/10 L UE (), 9/10 R wrist  Pain altered Tx:  [x] No  [] Yes  Action:    Comments: Pt overall states no change. His R wrist is getting worse, and he will be seeing Dr. Dea Reddy tomorrow for a consult regarding this complaint. Pt continues to c/o \"pressure\" in his neck, rating the pressure 5/10 in severity.      Objective:  Modalities: ice massage to R wrist 3'- not 21 as pt is doing this at home multiple times a day  Precautions:  Exercises: bolded completed 21 ubitus access code [email or web address]   Exercise Reps/ Time Weight/ Level Comments   UBE 3/3 lv1    Manual/MFR wrist extensors 19'  Extensors of the LUE  Radial side of RUE- cross friction massage over the tendons and palmar/dorsal glides to the [facility] and DIP joint; wrist flexion/extension/RD/UD with distraction; MFR to cervical paraspinals/occipital base in prone   Manual- Occipital base release ---  No restrictions noted, pt with good relaxation         Seated:      Upper trap stretch --- ---    Levator stretch --- ---    Scapular retraction  10x 5\" hold    Cervical retraction  10x 5\" hold    Thumb extension and abduction isometrics 10x10\"  5 completed with therapist and 5 completed by patient    Wrist flexion stretch 2x30\"   Reviewed 8/9   Wrist extension stretch 2x30\" ea   Reviewed 8/9   Finkelstein stretch x   Repeated vs sustained- reviewed 8/9   Eccentric wrist flexion 10x ea  5\" lowering  1#R  2#L    Eccentric wrist extension 10x ea  5\" lowering 1#R  2#L    Eccentric radial deviation 10x ea 5\" lowering  2#L  0#R    Hammer supination/pronation 1' ea  Holding at mid shaft   Theraband flex bar 3x10 ea Red Elbows flexed  Elbows straight  Pain-free range   Gripper- white 10x2 ea  Elbow flexed at side         Standing:      Corner pec stretch 3x 30\" hold    Wall push ups 10x     Serratus wall slides 10x     Wall angels 10x             Supine:      Chin tucks 10x 5\"    Deep neck flexion 2x5 3-5\" hold    No sirs 2x30\"     Yes sirs 2x30\"     Resisted H. abd with chin tuck 10x Orange loop    Shoulder flexion with chin tuck 2x5 Orange loop          Prone      Scapular retractions 10x5\"     Shoulder extension 6x6\"     H. abd 6x6\"     W's  6x6\"     Prone chin tucks 10x     Prone cervical extension 2x5     Other:        Treatment Charges: Mins Units   []  Modalities- ice massage      [x]  Ther Exercise 30 2   []  Manual Therapy     []  Ther Activities     []  Aquatics     []  Vasocompression     [x]  Neuro 14 1   Total Treatment time 44 3       Assessment: [] Progressing toward goals. [x] No change. Continued with cervical spine and postural muscle strengthening this date.  Significant time spent on review and education of HEP and treatment POC. Pt required frequent cues for proper technique with cervical chin tucks in supine and prone. During deep neck flexion, and yes sirs and no sirs pt reports fatigue, requiring a rest break. Also cued pt in prone for submaximal contraction as pt was over-engaging scapular retractors, causing increased tension in his neck musculature. [x] Other: see UE STGs  [x] Patient would continue to benefit from skilled physical therapy services in order to: address neck and BUE pain along with mobility issues in order to improve activity tolerance and QOL. Problems:    [x]? ? Pain: neck and LUE pain   [x]? ? ROM: decreased neck rotation and decreased flexibility in UEs  [x]? ? Strength: decreased and painful  strength; decreased deep neck flexor endurance  [x]? ? Function: UEFS: 32.5% impairment; NDI: 56% impairment         UEs Short term goals: MEET IN 8 VISITS- as of 8/30/21 Status   Pain: Pt will report less than or equal to 5-6/10 left elbow pain at worst throughout the day in order to improve tolerance to use of UEs for ADLs, housework, and lifting. Met    ROM: Pt will demonstrate a negative Finkelstein test on BUE in order to improve ability to grasp the UEs with minimal discomfort. Not met bilaterally,greater on the R   Strength: Pt will demonstrate ability to complete gripping tasks with less than or equal to 0-1/10 increase in left elbow pain in order to improve tolerance to ADLs. Met    Home Exercise Program: Pt will demonstrate independence with HEP. Met    Longer term goals: MEET IN 16 VISITS     Pain: Pt will report less than or equal to 3-4/10 left elbow pain at worst throughout the day in order to improve tolerance to use of UEs for ADLs, housework, and lifting. Ongoing   ROM: Pt will be able to demonstrate full AROM of the UEs with 0/10 pain in the medial and lateral elbow in order to improve use during ADLs and improve activity tolerance.   Ongoing   Strength: Pt will demonstrate greater than or equal to 5/5 BUE strength with 0/10 elbow pain with testing and an improvement in left  strength by 5 lbs to improve tolerance to lifting, grasping, and ADL completion. Ongoing   Outcome Score: Pt will improve the UEFS score by greater than or equal to 10% in order to demonstrate an improvement in function. Ongoing      Neck STG: (to be met in 8 treatments)  1. ? Pain: Pt will report less than or equal to 6-7/10 neck pain at most throughout the day and a decrease in headaches by 25% in order to improve tolerance to ADL completion and improve QOL. 2. ? ROM: Pt will be able to demonstrate neck rotation to the R and left to greater than or equal to 60 degrees in order to assist scanning the environment and safe driving. 3. ? Strength: Pt will be able to complete the 10 head lifts off the table with initiating movement from deep neck flexors vs global neck flexors in order to demonstrate neuromuscular re-education and postural awareness/strenght. 4. ? Function: Pt will score less than or equal to 50% on the NDI in order to demonstrate improved function on ADLs. 5. Independent with Home Exercise Programs  LTG: (to be met in 16 treatments)  1. ? Pain: Pt will report less than or equal 4-5/10 neck pain at most throughout the day and a decrease in headaches by 50% in order to improve tolerance to ADL completion and improve QOL. 2. ? ROM: Pt will demonstrate cervical ROM to greater than or equal to 70 degrees rotation (B) and maintain for 10\" and flexion, extension, and SB without pain/reports of tightness in order to improve tolerance to driving and completing ADLs. 3. ? Strength: Pt will demonstrate ability to complete deep neck flexor endurance test for greater than or equal to 38\" without an increase in neck pain in order to demonstrate improved postural strength and control when completing ADLs.    4. ? Function: Pt will score less than or equal to 45% on the NDI in order to demonstrate improved function with ADLs.                   Patient goals: to feel better, strong without pain       Pt. Education:  [x] Yes  [] No  [x] Reviewed Prior HEP/Ed. Much review needed for prone exercises   Method of Education: [x] Verbal  [x] Demo  [] Written  8/26/21: prone scapular retractions; prone extension, prone h. Abd, prone Ws  Comprehension of Education:  [] Verbalizes understanding. [] Demonstrates understanding. [x] Needs review to ensure proper technique. [x] Demonstrates/verbalizes HEP/Ed previously given. Plan: [x] Continue current frequency toward long and short term goals.     [x] Specific Instructions for subsequent treatments: ensure scapular mobility, and of the elbow/forearm/hand utilizing hawkgrips and MFR techniques; progress to deep neck flexor and postural strengthening and elbow, forearm, wrist,  strengthening/stretching - as symptoms become less diffuse- trial use of mobilizations to the elbow with gripping to improve  strength      Time In: 4:30pm             Time Out: 5:20pm     Electronically signed by:  Laura Lamb PTA

## 2021-08-31 ENCOUNTER — OFFICE VISIT (OUTPATIENT)
Dept: ORTHOPEDIC SURGERY | Age: 51
End: 2021-08-31
Payer: MEDICARE

## 2021-08-31 VITALS — SYSTOLIC BLOOD PRESSURE: 129 MMHG | HEART RATE: 84 BPM | DIASTOLIC BLOOD PRESSURE: 78 MMHG

## 2021-08-31 DIAGNOSIS — M65.4 DE QUERVAIN'S TENOSYNOVITIS, RIGHT: Primary | ICD-10-CM

## 2021-08-31 PROCEDURE — G8417 CALC BMI ABV UP PARAM F/U: HCPCS | Performed by: FAMILY MEDICINE

## 2021-08-31 PROCEDURE — 3017F COLORECTAL CA SCREEN DOC REV: CPT | Performed by: FAMILY MEDICINE

## 2021-08-31 PROCEDURE — 99203 OFFICE O/P NEW LOW 30 MIN: CPT | Performed by: FAMILY MEDICINE

## 2021-08-31 PROCEDURE — 20550 NJX 1 TENDON SHEATH/LIGAMENT: CPT | Performed by: FAMILY MEDICINE

## 2021-08-31 PROCEDURE — 1036F TOBACCO NON-USER: CPT | Performed by: FAMILY MEDICINE

## 2021-08-31 PROCEDURE — G8428 CUR MEDS NOT DOCUMENT: HCPCS | Performed by: FAMILY MEDICINE

## 2021-08-31 RX ORDER — BETAMETHASONE SODIUM PHOSPHATE AND BETAMETHASONE ACETATE 3; 3 MG/ML; MG/ML
6 INJECTION, SUSPENSION INTRA-ARTICULAR; INTRALESIONAL; INTRAMUSCULAR; SOFT TISSUE ONCE
Status: COMPLETED | OUTPATIENT
Start: 2021-08-31 | End: 2021-08-31

## 2021-08-31 RX ORDER — BUPIVACAINE HYDROCHLORIDE 5 MG/ML
1 INJECTION, SOLUTION PERINEURAL ONCE
Status: COMPLETED | OUTPATIENT
Start: 2021-08-31 | End: 2021-08-31

## 2021-08-31 RX ADMIN — BETAMETHASONE SODIUM PHOSPHATE AND BETAMETHASONE ACETATE 6 MG: 3; 3 INJECTION, SUSPENSION INTRA-ARTICULAR; INTRALESIONAL; INTRAMUSCULAR; SOFT TISSUE at 13:03

## 2021-08-31 RX ADMIN — BUPIVACAINE HYDROCHLORIDE 5 MG: 5 INJECTION, SOLUTION PERINEURAL at 13:03

## 2021-08-31 NOTE — PROGRESS NOTES
Sports Medicine Consultation    CHIEF COMPLAINT:  Wrist Pain (Rt wrist pain 1m. no trauma. radial sided.)      HPI:  The patient is a 46 y.o. male who is being seen for  new patient being seen for regarding new problem of  r wrist pain. The patient is a right hand dominant male who has had right hand/wrist pain for 3-4 weeks. As far as trauma to the hand the patient indicates no specific trauma. The following medications/interventions have been tried: pt, brace, diclofenac gel without benefit. he has a past medical history of ALT (SGPT) level raised, Bilateral thoracic back pain, Bronchitis, Contusion of right knee, DDD (degenerative disc disease), cervical, Dyslipidemia, Dyspepsia, Fractured toe, Herniated cervical disc, Herpes zoster without complication, Hypoglycemia, Hypogonadism in male, Immunization counseling, Insomnia, Leukocytopenia, unspecified, Leukopenia, Myofascial muscle pain, Need for shingles vaccine, Pain in abdominal muscle of right flank, Pharyngoesophageal dysphagia, Post laminectomy syndrome, Prostate cancer screening, and Rhinosinusitis. he has a past surgical history that includes lumbar laminectomy (); hernia repair (Bilateral, ); Colonoscopy; Endoscopy, colon, diagnostic (); Upper gastrointestinal endoscopy (N/A, 2017); and Cardiac catheterization (). Family history is unknown by patient.     Social History     Socioeconomic History    Marital status:      Spouse name: Not on file    Number of children: Not on file    Years of education: Not on file    Highest education level: Not on file   Occupational History    Not on file   Tobacco Use    Smoking status: Former Smoker     Quit date: 1997     Years since quittin.6    Smokeless tobacco: Never Used   Vaping Use    Vaping Use: Never used   Substance and Sexual Activity    Alcohol use: No    Drug use: No    Sexual activity: Yes     Partners: Female   Other Topics Concern  Not on file   Social History Narrative    Not on file     Social Determinants of Health     Financial Resource Strain:     Difficulty of Paying Living Expenses:    Food Insecurity:     Worried About Running Out of Food in the Last Year:     [address] in the Last Year:    Transportation Needs:     Lack of Transportation (Medical):      Lack of Transportation (Non-Medical):    Physical Activity:     Days of Exercise per Week:     Minutes of Exercise per Session:    Stress:     Feeling of Stress :    Social Connections:     Frequency of Communication with Friends and Family:     Frequency of Social Gatherings with Friends and Family:     Attends [employer] Services:     Active Member of Clubs or Organizations:     Attends Club or Organization Meetings:     Marital Status:    Intimate Partner Violence:     Fear of Current or Ex-Partner:     Emotionally Abused:     Physically Abused:     Sexually Abused:        Current Outpatient Medications   Medication Sig Dispense Refill    amitriptyline (ELAVIL) 75 MG tablet Take 1 tablet by mouth 2 times daily 60 tablet 3    RESTASIS 0.05 % ophthalmic emulsion       Omega-3 Fatty Acids (FISH OIL) 1000 MG CAPS Take 3,000 mg by mouth 3 times daily      Glucosamine-Chondroit-Vit C-Mn (GLUCOSAMINE 1500 COMPLEX PO) Take by mouth      Multiple Vitamins-Minerals (THERAPEUTIC MULTIVITAMIN-MINERALS) tablet Take 1 tablet by mouth daily      celecoxib (CELEBREX) 50 MG capsule Take 1 capsule by mouth daily TAKE ONE CAPSULE BY MOUTH DAILY 60 capsule 3    Cholecalciferol (VITAMIN D3) 125 MCG (5000 UT) CAPS Take 1 capsule by mouth Daily 30 capsule 3    omeprazole (PRILOSEC) 40 MG delayed release capsule TAKE ONE CAPSULE BY MOUTH DAILY 30 capsule 2    fluticasone (FLONASE) 50 MCG/ACT nasal spray 1 spray by Nasal route daily 1 Bottle 3    diclofenac sodium (VOLTAREN) 1 % GEL Apply topically 2 times daily 150 g 1     No current facility-administered medications for this visit. Allergies:  hehas No Known Allergies. ROS:  CV:  Denies chest pain; palpitations; shortness of breath; swelling of feet, ankles; and loss of consciousness. CON: Denies fever and dizziness. ENT:  Denies hearing loss / ringing, ear infections hoarseness, and swallowing problems. RESP:  Denies chronic cough, spitting up blood, and asthma/wheezing. GI: Denies abdominal pain, change in bowel habits, nausea or vomiting, and blood in stools. :  Denies frequent urination, burning or painful urination, blood in the urine, and bladder incontinence. NEURO:  Denies headache, memory loss, sleep disturbance, and tremor or movement disorder. PHYSICAL EXAM:    SKIN:  Intact without rashes, lesions or ulcerations. No obvious deformity or swelling. EYES:  Extraocular muscles intact. MOUTH: Oral mucosa moist.  No perioral lesions. PULM:  Respirations unlabored and regular. VASC:  Capillary refill less than 2 seconds. Hand/Wrist Exam  ROM:  Full flexor and extensor tendon function intact   Finger, hand, and wrist range of motion are WNL  Inspection-Deformity: no  Palpation-Tenderness: first dorsal  There is is not thenar and is not interosseous atrophy. Strength- WNL  NEURO: Radial, ulnar, and median nerves are intact to motor and sensory testing. Two point discrimination is less than six mm. There is not decreased sensation to light touch and pinprick in the median and ulnar nerve distribution. CTS: Tinel's test at the wrist is negative. Flexion compression test negative  Phalen's test is negative. Finkelstein's test is positive. Elbow:  Range of motion and strength about the elbow is intact. Tinel's test is negative at the elbow. Cerv:  Full pain free range of motion with a negative Spurling's test.    RADIOLOGY: No results found.   4 view radiographs of the right wrist failed to demonstrate any significant osseous abnormalities no obvious fractures or dislocations are noted on plain film radiograph of the right wrist    Assessment no acute osseous abnormalities of the right wrist  IMPRESSION:     1. De Quervain's tenosynovitis, right        PLAN:   We discussed some of the etiologies and natural histories of     ICD-10-CM    1. De Quervain's tenosynovitis, right  M65.4 XR WRIST RIGHT (MIN 3 VIEWS)     We discussed the various treatment alternatives including anti-inflammatory medications, physical therapy, injections, further imaging studies and as a last resort surgery. This point patient has fairly significant de Quervain's tenosynovitis I do think treatment is appropriate for that I would recommend a cortisone injection thumb spica bracing. And after the risk benefits alternatives of procedure itself were discussed. Patient's right wrist first dorsal compartment was prepped in a sterile manner with Betadine skin was then cooled with cold spray then recleaned with an alcohol prep I then injected 1 mL of 6 mg of betamethasone and 1 mL of 0.5% Marcaine. Patient tolerated the procedure well. [place] immediate pain relief. He was provided a thumb spica splint in the office today and will follow up with us in 8 weeks for reevaluation    No follow-ups on file. Please be aware portions of this note were completed using voice recognition software and unforeseen errors may have occurred    Electronically signed by Amari Sinha DO, FAOASM  on 8/31/21 at 10:53 AM EDT      No orders of the defined types were placed in this encounter.

## 2021-09-02 ENCOUNTER — APPOINTMENT (OUTPATIENT)
Dept: PHYSICAL THERAPY | Facility: CLINIC | Age: 51
End: 2021-09-02
Payer: MEDICARE

## 2021-09-07 ENCOUNTER — APPOINTMENT (OUTPATIENT)
Dept: PHYSICAL THERAPY | Facility: CLINIC | Age: 51
End: 2021-09-07
Payer: MEDICARE

## 2021-09-09 ENCOUNTER — APPOINTMENT (OUTPATIENT)
Dept: PHYSICAL THERAPY | Facility: CLINIC | Age: 51
End: 2021-09-09
Payer: MEDICARE

## 2021-09-13 ENCOUNTER — APPOINTMENT (OUTPATIENT)
Dept: PHYSICAL THERAPY | Facility: CLINIC | Age: 51
End: 2021-09-13
Payer: MEDICARE

## 2021-09-16 ENCOUNTER — HOSPITAL ENCOUNTER (OUTPATIENT)
Dept: PHYSICAL THERAPY | Facility: CLINIC | Age: 51
Setting detail: THERAPIES SERIES
Discharge: HOME | End: 2021-09-16
Payer: MEDICARE

## 2021-09-16 PROCEDURE — 97112 NEUROMUSCULAR REEDUCATION: CPT

## 2021-09-16 PROCEDURE — 97110 THERAPEUTIC EXERCISES: CPT

## 2021-09-16 NOTE — FLOWSHEET NOTE
[] [facility]) - [facility] &  Therapy  [address]  [phone]  F: [phone] [x] [address] [facility] [address]   [address]  P: [phone]  F: [phone] [] [address] &  Therapy  [address]  P: [phone]  F: [phone] [] [address]  P: [phone]  F: [phone] [] [address]  [facility]   [address]   [place]: [phone]  F: [phone]      Physical Therapy Daily Treatment Note    Date:  2021  Patient Name:  Yanet Baltazar    :  1970  MRN: [id number]  Physician: Owen Valentin MD                                    Insurance: [place] [employer]age (30 vs/ year)  Medical Diagnosis: M54.2, G89.29 (ICD-10-CM) - Chronic neck pain; M77.12 (ICD-10-CM) - Left lateral epicondylitis                       Rehab Codes: M54.2, M79.601, M79.602, M62.81  Onset Date: 21                 Next 's appt: 10/12/21  Visit# / total visits:      Cancels/No Shows: 0    Subjective:    Pain:  [x] Yes  [] No Location: neck, L UE  Pain Rating: (0-10 scale) 1-2/10 Neck, 1/10 L UE (), 0-1/10 R wrist  Pain altered Tx:  [x] No  [] Yes  Action:    Comments: Pt arrives stating his neck isn't too bad today. Pt reports the injection helped a lot with his thumb and is using the splint the doctor prescribed. Pt reports he is not pushing to the point of increased pain in his left thumb so he is not sure if the intensity is the same as it was before.     Objective:  Modalities: ice massage to R wrist 3'- not 21   Precautions:  Exercises: bolded completed 21 MicroEval access code [email or web address]   Exercise Reps/ Time Weight/ Level Comments   UBE 2/2 lv1    Manual/MFR wrist extensors 19'  Extensors of the LUE  Radial side of toward goals. Pt reports some pressure in the back his neck at a 2/10 at the end of the session. Pt returns to the clinic after receiving a steroid injection to the R deQuervain's region and use of spica splint which is assisting with his increased symptoms. Pt also with decreased pain reports of all regions this date. Re-assessment of neck goals identified increased ROM of the cervical spine, improvements in cervical deep neck flexor activation, and function on NDI (24% impairment). Continued to complete deep neck flexor activation and strengthening exercises without complaints of pain. Pt does require cueing to encourage OA flexion vs increased retraction along with maintaining retracted chin positioning during head lifts. Intermittent tactile cues are provided during prone exercises to prevent upper trap compensations. Pt with notable fatigue of the scapular musculature during the second set of serratus slides, therefore these were completed to failure. Progressed postural strengthening exercises in standing with good tolerance and addition of deltoid and biceps strenghtening was completed without complaints of UE pain. [] No change. [x] Other: see neck STGs  [x] Patient would continue to benefit from skilled physical therapy services in order to: address neck and BUE pain along with mobility issues in order to improve activity tolerance and QOL. Problems:    [x]? ? Pain: neck and LUE pain   [x]? ? ROM: decreased neck rotation and decreased flexibility in UEs  [x]? ? Strength: decreased and painful  strength; decreased deep neck flexor endurance  [x]? ? Function: UEFS: 32.5% impairment; NDI: 56% impairment         UEs Short term goals: MEET IN 8 VISITS- as of 8/30/21 Status   Pain: Pt will report less than or equal to 5-6/10 left elbow pain at worst throughout the day in order to improve tolerance to use of UEs for ADLs, housework, and lifting.  Met    ROM: Pt will demonstrate a negative Finkelstein test on BUE in order to improve ability to grasp the UEs with minimal discomfort. Not met bilaterally,greater on the R   Strength: Pt will demonstrate ability to complete gripping tasks with less than or equal to 0-1/10 increase in left elbow pain in order to improve tolerance to ADLs. Met    Home Exercise Program: Pt will demonstrate independence with HEP. Met    Longer term goals: MEET IN 16 VISITS     Pain: Pt will report less than or equal to 3-4/10 left elbow pain at worst throughout the day in order to improve tolerance to use of UEs for ADLs, housework, and lifting. Ongoing   ROM: Pt will be able to demonstrate full AROM of the UEs with 0/10 pain in the medial and lateral elbow in order to improve use during ADLs and improve activity tolerance. Ongoing   Strength: Pt will demonstrate greater than or equal to 5/5 BUE strength with 0/10 elbow pain with testing and an improvement in left  strength by 5 lbs to improve tolerance to lifting, grasping, and ADL completion. Ongoing   Outcome Score: Pt will improve the UEFS score by greater than or equal to 10% in order to demonstrate an improvement in function. Ongoing      Neck STG: (to be met in 8 treatments)  1. ? Pain: Pt will report less than or equal to 6-7/10 neck pain at most throughout the day and a decrease in headaches by 25% in order to improve tolerance to ADL completion and improve QOL. MET 9/16  2. ? ROM: Pt will be able to demonstrate neck rotation to the R and left to greater than or equal to 60 degrees in order to assist scanning the environment and safe driving. MET 9/16 (62° B)  3. ? Strength: Pt will be able to complete the 10 head lifts off the table with initiating movement from deep neck flexors vs global neck flexors in order to demonstrate neuromuscular re-education and postural awareness/strenght. MET 9/16  4. ? Function: Pt will score less than or equal to 50% on the NDI in order to demonstrate improved function on ADLs. MET 9/16 (24% impairment)  5. Independent with Home Exercise Programs  LTG: (to be met in 16 treatments)  1. ? Pain: Pt will report less than or equal 4-5/10 neck pain at most throughout the day and a decrease in headaches by 50% in order to improve tolerance to ADL completion and improve QOL. 2. ? ROM: Pt will demonstrate cervical ROM to greater than or equal to 70 degrees rotation (B) and maintain for 10\" and flexion, extension, and SB without pain/reports of tightness in order to improve tolerance to driving and completing ADLs. 3. ? Strength: Pt will demonstrate ability to complete deep neck flexor endurance test for greater than or equal to 38\" without an increase in neck pain in order to demonstrate improved postural strength and control when completing ADLs. 4. ? Function: Pt will score less than or equal to 45% on the NDI in order to demonstrate improved function with ADLs.                   Patient goals: to feel better, strong without pain       Pt. Education:  [x] Yes  [] No  [x] Reviewed Prior HEP/Ed. Much review needed for prone exercises   Method of Education: [x] Verbal  [] Demo  [] Written  8/26/21: prone scapular retractions; prone extension, prone h. Abd, prone Ws  Comprehension of Education:  [] Verbalizes understanding. [] Demonstrates understanding. [x] Needs review to ensure proper technique. [x] Demonstrates/verbalizes HEP/Ed previously given. Plan: [x] Continue current frequency toward long and short term goals.     [x] Specific Instructions for subsequent treatments: ensure scapular mobility, and of the elbow/forearm/hand utilizing hawkgrips and MFR techniques; progress to deep neck flexor and postural strengthening and elbow, forearm, wrist,  strengthening/stretching - as symptoms become less diffuse- trial use of mobilizations to the elbow with gripping to improve  strength      Time In: 404 pm             Time Out: 454pm     Electronically signed by:  Valerio Artis Tonya Sanderson, PT

## 2021-09-21 ENCOUNTER — HOSPITAL ENCOUNTER (OUTPATIENT)
Dept: PHYSICAL THERAPY | Facility: CLINIC | Age: 51
Setting detail: THERAPIES SERIES
Discharge: HOME | End: 2021-09-21
Payer: MEDICARE

## 2021-09-21 PROCEDURE — 97112 NEUROMUSCULAR REEDUCATION: CPT

## 2021-09-21 PROCEDURE — 97110 THERAPEUTIC EXERCISES: CPT

## 2021-09-21 NOTE — FLOWSHEET NOTE
[] [facility]) - [facility] &  Therapy  [address]  [phone]  F: [phone] [x] [address]  [facility]   [address]  P: [phone]  F: [phone] [] [address] &  Therapy  [address]  P: [phone]  F: [phone] [] [address]  P: [phone]  F: [phone] [] [address]  [facility]   [address]   [place]: [phone]  F: [phone]      Physical Therapy Daily Treatment Note    Date:  2021  Patient Name:  Eli Gill    :  1970  MRN: [id number]  Physician: Katherin Arreola MD                                    Insurance: [employer] (30 vs/ year)  Medical Diagnosis: M54.2, G89.29 (ICD-10-CM) - Chronic neck pain; M77.12 (ICD-10-CM) - Left lateral epicondylitis                       Rehab Codes: M54.2, M79.601, M79.602, M62.81  Onset Date: 21                 Next 's appt: 10/12/21  Visit# / total visits: 10/16     Cancels/No Shows: 0    Subjective:    Pain:  [x] Yes  [] No Location: neck, L UE  Pain Rating: (0-10 scale) 1-2/10 Neck, 1-2/10 L UE (), 1-2/10 R wrist  Pain altered Tx:  [x] No  [] Yes  Action:    Comments: pt continues to feel improved, with lower pain complaints consistently.      Objective:  Modalities: ice massage to R wrist 3'- not 21   Precautions:  Exercises: bolded completed 21 [employer] access code [email or web address]   Exercise Reps/ Time Weight/ Level Comments   UBE 2/2 lv1    Manual/MFR wrist extensors 19'  Extensors of the LUE  Radial side of RUE- cross friction massage over the tendons and palmar/dorsal glides to the [facility] and DIP joint; wrist flexion/extension/RD/UD with distraction; MFR to cervical paraspinals/occipital base in prone   Manual- Occipital base release ---  No restrictions noted, pt with good relaxation         Seated:      Upper trap stretch --- ---    Levator stretch --- ---    Scapular retraction  10x 5\" hold    Cervical retraction  10x 5\" hold    Thumb extension and abduction isometrics 10x10\"  5 completed with therapist and 5 completed by patient    Wrist flexion stretch 2x30\"   Reviewed 8/9   Wrist extension stretch 2x30\" ea   Reviewed 8/9   Finkelstein stretch x   Repeated vs sustained- reviewed 8/9   Eccentric wrist flexion 10x ea  5\" lowering  1#R  2#L    Eccentric wrist extension 10x ea  5\" lowering 1#R  2#L    Eccentric radial deviation 10x ea 5\" lowering  2#L  0#R    Hammer supination/pronation 1' ea  Holding at mid shaft   Theraband flex bar 3x10 ea Red Elbows flexed  Elbows straight  Pain-free range   Gripper- white 10x2 ea  Elbow flexed at side         Standing:      Corner pec stretch 3x 30\" hold    Wall push ups 10x2 orange    Serratus wall slides 10x2   orange    Wall angels 10x2       Band biceps curls 15-20x lime    Band front raise 12x lime    Band lateral raise 12x lime                Supine:      Chin tucks 10x 5\"    Deep neck flexion 2x5 3-5\" hold    No sirs 2x30\"     Yes sirs 2x30\"     Resisted H. abd with chin tuck 10x Orange loop    Shoulder flexion with chin tuck 2x5 Orange loop          Prone      Scapular retractions 10x5\"     Scapular depression 10x5\"     Shoulder extension 10x5\"     H. abd 10x5\"     W's  10x5\"        RAIZA chin tucks 10x 5\"    RAIZA cervical extension 2x5             Treatment Charges: Mins Units   []  Modalities- ice massage      [x]  Ther Exercise 45 3   []  Manual Therapy     []  Ther Activities     []  Aquatics     []  Vasocompression     [x]  Neuro 9 1   Total Treatment time 54 4       Assessment: [x] Progressing toward goals. Continued with cervical focused exercises. Pt is able to complete all with no c/o pain. Some pressure noted in occiput during no sir exercise.  Pt performs exercises at slow and Reviewed Prior HEP/Ed. Method of Education: [x] Verbal  [x] Demo  [] Written  8/26/21: prone scapular retractions; prone extension, prone h. Abd, prone Ws  Comprehension of Education:  [] Verbalizes understanding. [] Demonstrates understanding. [] Needs review to ensure proper technique. [x] Demonstrates/verbalizes HEP/Ed previously given. Pt with good form for all exercises. Plan: [x] Continue current frequency toward long and short term goals.     [x] Specific Instructions for subsequent treatments: ensure scapular mobility, and of the elbow/forearm/hand utilizing hawkgrips and MFR techniques; progress to deep neck flexor and postural strengthening and elbow, forearm, wrist,  strengthening/stretching - as symptoms become less diffuse- trial use of mobilizations to the elbow with gripping to improve  strength      Time In: 2:40pm             Time Out: 3:34pm     Electronically signed by:  Erika Pemberton PTA

## 2021-09-23 ENCOUNTER — HOSPITAL ENCOUNTER (OUTPATIENT)
Dept: PHYSICAL THERAPY | Facility: CLINIC | Age: 51
Setting detail: THERAPIES SERIES
Discharge: HOME | End: 2021-09-23
Payer: MEDICARE

## 2021-09-23 PROCEDURE — 97110 THERAPEUTIC EXERCISES: CPT

## 2021-09-23 NOTE — FLOWSHEET NOTE
[] [facility]) [facility] &  Therapy  [address]  [phone]  F: [phone] [x] [address]  [place] [address]   [address]  P: [phone]  F: [phone] [] [place]  [address]  P: [phone]  F: [phone] [] [address]  P: [phone]  F: [phone] [] [address]  [facility]   [address]   [place]: [phone]  F: [phone]      Physical Therapy Daily Treatment Note    Date:  2021  Patient Name:  Miles Cooper    :  1970  MRN: [id number]  Physician: Sekou Santamaria MD                                    Insurance: [employer] (30 vs/lucas year)  Medical Diagnosis: M54.2, G89.29 (ICD-10-CM) - Chronic neck pain; M77.12 (ICD-10-CM) - Left lateral epicondylitis                       Rehab Codes: M54.2, M79.601, M79.602, M62.81  Onset Date: 21                 Next 's appt: 10/12/21  Visit# / total visits:      Cancels/No Shows: 0    Subjective:    Pain:  [x] Yes  [] No Location: neck, L UE  Pain Rating: (0-10 scale) 2/10 Neck, 2/10 L UE (), 2/10 R wrist  Pain altered Tx:  [x] No  [] Yes  Action:    Comments: Pt reports 2/10 pain in both the neck and wrist this date.  States he was having a lot of wrist pain yesterday from helping his wife wash the dishes but the pain subsided with rest.     Objective:  Modalities: ice massage to R wrist 3'- not 21   Precautions:  Exercises: bolded completed 21 [employer] access code [email or web address]   Exercise Reps/ Time Weight/ Level Comments   UBE 2/2 lv1    Manual/MFR wrist extensors 19'  Extensors of the LUE  Radial side of RUE- cross friction massage over the tendons and palmar/dorsal glides to the [facility] and DIP joint; wrist flexion/extension/RD/UD with distraction; MFR to cervical paraspinals/occipital base in prone   Manual- Occipital base release ---  No restrictions noted, pt with good relaxation         Seated:      Upper trap stretch --- ---    Levator stretch --- ---    Scapular retraction  10x 5\" hold    Cervical retraction  10x 5\" hold    Thumb extension and abduction isometrics 10x10\"  5 completed with therapist and 5 completed by patient    Wrist flexion stretch 2x30\"   Reviewed 8/9   Wrist extension stretch 2x30\" ea   Reviewed 8/9   Finkelstein stretch x   Repeated vs sustained- reviewed 8/9   Eccentric wrist flexion 10x ea  5\" lowering  1#R  2#L    Eccentric wrist extension 10x ea  5\" lowering 1#R  2#L    Eccentric radial deviation 10x ea 5\" lowering  2#L  0#R    Hammer supination/pronation 1' ea  Holding at mid shaft   Theraband flex bar 3x10 ea Red Elbows flexed  Elbows straight  Pain-free range   Gripper- white 10x2 ea  Elbow flexed at side         Standing:      Corner pec stretch 3x 30\" hold    Wall push ups 10x2 orange    Serratus wall slides 10x2   orange    Wall angels 10x2       Band biceps curls 2x10 lime    Band front raise 12x lime    Band lateral raise 12x lime                Supine:      Chin tucks 10x 5\"    Deep neck flexion 2x5 3-5\" hold    No sirs 2x30\"     Yes sirs 2x30\"     Resisted H. abd with chin tuck 10x Orange loop    Shoulder flexion with chin tuck 2x5 Orange loop          Prone      Scapular retractions 10x5\"     Scapular depression 10x5\"     Shoulder extension 10x5\"     H. abd 10x5\"     W's  10x5\"        RAIAZ chin tucks 10x 5\"    RAIZA cervical extension 2x5             Treatment Charges: Mins Units   []  Modalities- ice massage      [x]  Ther Exercise 45 3   []  Manual Therapy     []  Ther Activities     []  Aquatics     []  Vasocompression     [x]  Neuro 5 -   Total Treatment time 50 3       Assessment: [x] Progressing toward goals.  Continued exercise log with overall good tolerance of all exercises this date with no report of increasing pain. Focused treatment on cervical and shoulder exercises this date. Pt continues to be challenged by shoulder tband exercises, did not progress those today. Provided verbal cues for technique and hold times with good carryover. Encouraged pt to continue with HEP to progress cervical ROM and UE strength. Pt reports a decrease in pain to 1/10 at end of treatment. [] No change. [] Other:   [x] Patient would continue to benefit from skilled physical therapy services in order to: address neck and BUE pain along with mobility issues in order to improve activity tolerance and QOL. Problems:    [x]? ? Pain: neck and LUE pain   [x]? ? ROM: decreased neck rotation and decreased flexibility in UEs  [x]? ? Strength: decreased and painful  strength; decreased deep neck flexor endurance  [x]? ? Function: UEFS: 32.5% impairment; NDI: 56% impairment         UEs Short term goals: MEET IN 8 VISITS- as of 8/30/21 Status   Pain: Pt will report less than or equal to 5-6/10 left elbow pain at worst throughout the day in order to improve tolerance to use of UEs for ADLs, housework, and lifting. Met    ROM: Pt will demonstrate a negative Finkelstein test on BUE in order to improve ability to grasp the UEs with minimal discomfort. Not met bilaterally,greater on the R   Strength: Pt will demonstrate ability to complete gripping tasks with less than or equal to 0-1/10 increase in left elbow pain in order to improve tolerance to ADLs. Met    Home Exercise Program: Pt will demonstrate independence with HEP. Met    Longer term goals: MEET IN 16 VISITS     Pain: Pt will report less than or equal to 3-4/10 left elbow pain at worst throughout the day in order to improve tolerance to use of UEs for ADLs, housework, and lifting.  Ongoing   ROM: Pt will be able to demonstrate full AROM of the UEs with 0/10 pain in the medial and lateral elbow in order to improve use during ADLs and and control when completing ADLs. 4. ? Function: Pt will score less than or equal to 45% on the NDI in order to demonstrate improved function with ADLs.                   Patient goals: to feel better, strong without pain       Pt. Education:  [x] Yes  [] No  [x] Reviewed Prior HEP/Ed. Method of Education: [x] Verbal  [x] Demo  [] Written  8/26/21: prone scapular retractions; prone extension, prone h. Abd, prone Ws  Comprehension of Education:  [] Verbalizes understanding. [] Demonstrates understanding. [] Needs review to ensure proper technique. [x] Demonstrates/verbalizes HEP/Ed previously given. Pt with good form for all exercises. Plan: [x] Continue current frequency toward long and short term goals.     [x] Specific Instructions for subsequent treatments: ensure scapular mobility, and of the elbow/forearm/hand utilizing hawkgrips and MFR techniques; progress to deep neck flexor and postural strengthening and elbow, forearm, wrist,  strengthening/stretching - as symptoms become less diffuse- trial use of mobilizations to the elbow with gripping to improve  strength      Time In: 3:00pm             Time Out: 3:50pm     Electronically signed by:  Judit Yip PTA

## 2021-09-28 ENCOUNTER — HOSPITAL ENCOUNTER (OUTPATIENT)
Dept: PHYSICAL THERAPY | Facility: CLINIC | Age: 51
Setting detail: THERAPIES SERIES
Discharge: HOME | End: 2021-09-28
Payer: MEDICARE

## 2021-09-28 PROCEDURE — 97110 THERAPEUTIC EXERCISES: CPT

## 2021-09-28 PROCEDURE — 97140 MANUAL THERAPY 1/> REGIONS: CPT

## 2021-09-28 NOTE — FLOWSHEET NOTE
[] [facility]) [facility] &  Therapy  [address]  [phone]  F: [phone] [x] [address]  [employer] [address]   [address]  P: [phone]  F: [phone] [] [place]  [address]  P: [phone]  F: [phone] [] [address]  P: [phone]  F: [phone] [] [address]  [facility]   [address]   [place]: [phone]  F: [phone]      Physical Therapy Daily Treatment Note    Date:  2021  Patient Name:  Miles Cooper    :  1970  MRN: [id number]  Physician: Sekou Santamaria MD                                    Insurance: [employer] (30 vs/ year)  Medical Diagnosis: M54.2, G89.29 (ICD-10-CM) - Chronic neck pain; M77.12 (ICD-10-CM) - Left lateral epicondylitis                       Rehab Codes: M54.2, M79.601, M79.602, M62.81  Onset Date: 21                 Next 's appt: 10/12/21  Visit# / total visits:      Cancels/No Shows: 0    Subjective:    Pain:  [x] Yes  [] No Location: neck, L UE  Pain Rating: (0-10 scale) pain not quantified this date  Pain altered Tx:  [x] No  [] Yes  Action:    Comments: Pt reports he wishes he could go to the chiropractor every week. Pt states his neck is tight today. Pt states he got up early today which he fell asleep at 4 am and woke up at 1 pm. Pt states he has difficulty staying asleep if he does not take his medication. Feels like something gets stuck when sitting with head in certain position for 2-3 minutes. Pt continues to note ringing in his ears that varies throughout he day along with intermittent popping in his jaw when opening it.      Objective:  Modalities: ice massage to R wrist 3'- not 21   Precautions:  Exercises: bolded completed 09/28/21 [employer] access code [email or web address]   Exercise Reps/ Time Weight/ Level Comments   UBE 3/3 lv2    Manual 19'  Anterior neck         Seated:      pec stretch over stability ball 3x30\"  Manual overpressure          Standing:      Corner pec stretch 3x 30\" hold    Wall push ups 10x2 orange    Serratus wall slides 10x2   orange    Wall angels 10x2       Band biceps curls 2x10 lime    Band front raise 12x lime    Band lateral raise 12x lime                Supine:      Chin tucks 10x 5\"    Deep neck flexion 2x5 3-5\" hold    No sirs 10x     Yes sirs 2x30\"     Resisted H. abd with chin tuck 15x Orange loop    Shoulder flexion with chin tuck 2x5 Orange loop          Prone      Scapular retractions 10x5\"     Scapular depression 10x5\"     Shoulder extension 10x5\"     H. abd 10x5\"     W's  10x5\"        RAIZA chin tucks 10x 5\"    RAIZA cervical extension 2x5     Special testing:   - neg anterior shear  - neg transverse ligament test (superior)  - Sitting: difficulty palpating C2 to test transverse ligament   - neg sharp's filippo          Treatment Charges: Mins Units   []  Modalities- ice massage      [x]  Ther Exercise 30 2   [x]  Manual Therapy 19 1   []  Ther Activities     []  Aquatics     []  Vasocompression     []  Neuro     Total Treatment time 49 3       Assessment: [x] Progressing toward goals. Pt denies an increase in neck pain at the end of the session. Time was spent addressing anterior neck tightness with  Myofascial release to the scalenes and SCM. Pt with good response to MFR to the anterior neck. Followed up manual therapy with muscle activation exercises to the deep neck flexor musculature with good execution. Pt without evidence of chin deviation during cervical retractions. Pt did require cueing to maintain cervical retraction during UE movement in supine. Pt did have difficulty coordinating movements with RAIZA neck extension as pt would compensation with upper cervical extension.  Pt was able to complete exercises with tactile cueing at the forehead and chin to assist with motor planning. Ended session with pec stretching over a stability ball with good stretch noted. [] No change. [] Other: During evaluation, pt continues to report constant ringing in the ears which fluctuates in intensity throughout the day. Pt also recently reported experiencing a clicking or catching sensation when moving from a sustained flexed position (2-3 minutes) to an extended position. Pt does not report any presence or change of radicular symptoms when this occurs. Pt's ligamentous testing is negative, however, based on HEIDI, possibility for ligamentous instability is present. [x] Patient would continue to benefit from skilled physical therapy services in order to: address neck and BUE pain along with mobility issues in order to improve activity tolerance and QOL. Problems:    [x]? ? Pain: neck and LUE pain   [x]? ? ROM: decreased neck rotation and decreased flexibility in UEs  [x]? ? Strength: decreased and painful  strength; decreased deep neck flexor endurance  [x]? ? Function: UEFS: 32.5% impairment; NDI: 56% impairment         UEs Short term goals: MEET IN 8 VISITS- as of 8/30/21 Status   Pain: Pt will report less than or equal to 5-6/10 left elbow pain at worst throughout the day in order to improve tolerance to use of UEs for ADLs, housework, and lifting. Met    ROM: Pt will demonstrate a negative Finkelstein test on BUE in order to improve ability to grasp the UEs with minimal discomfort. Not met bilaterally,greater on the R   Strength: Pt will demonstrate ability to complete gripping tasks with less than or equal to 0-1/10 increase in left elbow pain in order to improve tolerance to ADLs. Met    Home Exercise Program: Pt will demonstrate independence with HEP.  Met    Longer term goals: MEET IN 16 VISITS     Pain: Pt will report less than or equal to 3-4/10 left elbow pain at worst throughout the day in order to improve tolerance to use of UEs for ADLs, housework, and lifting. Ongoing   ROM: Pt will be able to demonstrate full AROM of the UEs with 0/10 pain in the medial and lateral elbow in order to improve use during ADLs and improve activity tolerance. Ongoing   Strength: Pt will demonstrate greater than or equal to 5/5 BUE strength with 0/10 elbow pain with testing and an improvement in left  strength by 5 lbs to improve tolerance to lifting, grasping, and ADL completion. Ongoing   Outcome Score: Pt will improve the UEFS score by greater than or equal to 10% in order to demonstrate an improvement in function. Ongoing      Neck STG: (to be met in 8 treatments)  1. ? Pain: Pt will report less than or equal to 6-7/10 neck pain at most throughout the day and a decrease in headaches by 25% in order to improve tolerance to ADL completion and improve QOL. MET 9/16  2. ? ROM: Pt will be able to demonstrate neck rotation to the R and left to greater than or equal to 60 degrees in order to assist scanning the environment and safe driving. MET 9/16 (62° B)  3. ? Strength: Pt will be able to complete the 10 head lifts off the table with initiating movement from deep neck flexors vs global neck flexors in order to demonstrate neuromuscular re-education and postural awareness/strenght. MET 9/16  4. ? Function: Pt will score less than or equal to 50% on the NDI in order to demonstrate improved function on ADLs. MET 9/16 (24% impairment)  5. Independent with Home Exercise Programs  LTG: (to be met in 16 treatments)  1. ? Pain: Pt will report less than or equal 4-5/10 neck pain at most throughout the day and a decrease in headaches by 50% in order to improve tolerance to ADL completion and improve QOL.    2. ? ROM: Pt will demonstrate cervical ROM to greater than or equal to 70 degrees rotation (B) and maintain for 10\" and flexion, extension, and SB without pain/reports of tightness in order to improve tolerance to driving and completing ADLs. 3. ? Strength: Pt will demonstrate ability to complete deep neck flexor endurance test for greater than or equal to 38\" without an increase in neck pain in order to demonstrate improved postural strength and control when completing ADLs. 4. ? Function: Pt will score less than or equal to 45% on the NDI in order to demonstrate improved function with ADLs.                   Patient goals: to feel better, strong without pain       Pt. Education:  [x] Yes  [] No  [x] Reviewed Prior HEP/Ed. Method of Education: [x] Verbal  [x] Demo  [] Written  8/26/21: prone scapular retractions; prone extension, prone h. Abd, prone Ws  Comprehension of Education:  [] Verbalizes understanding. [] Demonstrates understanding. [] Needs review to ensure proper technique. [x] Demonstrates/verbalizes HEP/Ed previously given. Pt with good form for all exercises. Plan: [x] Continue current frequency toward long and short term goals.     [x] Specific Instructions for subsequent treatments: ensure scapular mobility, and of the elbow/forearm/hand utilizing hawkgrips and MFR techniques; progress to deep neck flexor and postural strengthening and elbow, forearm, wrist,  strengthening/stretching - as symptoms become less diffuse- trial use of mobilizations to the elbow with gripping to improve  strength      Time In: 3:00 pm             Time Out: 401pm     Electronically signed by:  Tez Diallo PT

## 2021-09-30 ENCOUNTER — HOSPITAL ENCOUNTER (OUTPATIENT)
Dept: PHYSICAL THERAPY | Facility: CLINIC | Age: 51
Setting detail: THERAPIES SERIES
Discharge: HOME | End: 2021-09-30
Payer: MEDICARE

## 2021-09-30 PROCEDURE — 97110 THERAPEUTIC EXERCISES: CPT

## 2021-09-30 NOTE — FLOWSHEET NOTE
[] [facility]) - [facility] &  Therapy  [address]  [phone]  F: [phone] [x] [address]   [address]  P: [phone]  F: [phone] [] [address] &  Therapy  [address]  P: [phone]  F: [phone] [] [address]  P: [phone]  F: [phone] [] [address]  [facility]   [address]   [place]: [phone]  F: [phone]      Physical Therapy Daily Treatment Note    Date:  2021  Patient Name:  Nicky Albert    :  1970  MRN: [id number]  Physician: Erick Whatley MD                                    Insurance: [employer] (30 vs/ year)  Medical Diagnosis: M54.2, G89.29 (ICD-10-CM) - Chronic neck pain; M77.12 (ICD-10-CM) - Left lateral epicondylitis                       Rehab Codes: M54.2, M79.601, M79.602, M62.81  Onset Date: 21                 Next 's appt: 10/12/21  Visit# / total visits:      Cancels/No Shows: 0    Subjective:    Pain:  [x] Yes  [] No Location: neck, L UE  Pain Rating: (0-10 scale) 2-3/10  Pain altered Tx:  [x] No  [] Yes  Action:    Comments: Pt with lower pain levels. Has discontinued wearing his brace on his R wrist for deQuervain's as he is feeling better. At this time pt reports his pain to be in his L elbow.         Objective:  Modalities: ice massage to R wrist 3'- not 21   Precautions:  Exercises: bolded completed 21 [employer] access code [email or web address]   Exercise Reps/ Time Weight/ Level Comments   UBE 3/3 lv2    Manual 19'  Anterior neck         Seated:      pec stretch over stability ball 3x30\"  Manual overpressure          Standing:      Corner pec stretch 3x 30\" hold    Wall push ups 10x2 orange    Serratus wall slides 10x2 orange    Wall angels 10x2       Band biceps curls 2x10 lime    Band front raise 15x lime Increased reps 9/30   Band lateral raise 15x lime Increased reps 9/30               Supine:      Chin tucks 10x 5\"    Deep neck flexion 2x5 3-5\" hold    No sirs 2x30\"     Yes sirs 2x30\"     Resisted H. abd with chin tuck 15x Orange loop    Shoulder flexion with chin tuck 2x10 Orange loop Increased reps 9/30         Prone      Scapular retractions 10x5\"     Scapular depression 10x5\"     Shoulder extension 10x5\"     H. abd 10x5\"     W's  10x5\"        RAIZA chin tucks 10x 5\"    RAIZA cervical extension 2x10     Special testing:   - neg anterior shear  - neg transverse ligament test (superior)  - Sitting: difficulty palpating C2 to test transverse ligament   - neg sharp's filippo          Treatment Charges: Mins Units   []  Modalities- ice massage      [x]  Ther Exercise 47 3   []  Manual Therapy     []  Ther Activities     []  Aquatics     []  Vasocompression     []  Neuro     Total Treatment time 47 3       Assessment: [x] Progressing toward goals. Started with warm up on UBE followed by prone exercises. Resumed standing exercises this date. All tolerated well with minimal cues for postural correction. Upon completion of treatment pt reports pain reduction. [] No change. [] Other:   [x] Patient would continue to benefit from skilled physical therapy services in order to: address neck and BUE pain along with mobility issues in order to improve activity tolerance and QOL. Problems:    [x] ? Pain: neck and LUE pain   [x] ? ROM: decreased neck rotation and decreased flexibility in UEs  [x] ? Strength: decreased and painful  strength; decreased deep neck flexor endurance  [x] ?  Function: UEFS: 32.5% impairment; NDI: 56% impairment         UEs Short term goals: MEET IN 8 VISITS- as of 8/30/21 Status   Pain: Pt will report less than or equal to 5-6/10 left elbow pain at worst throughout the day in order to improve tolerance to use of UEs for ADLs, housework, and lifting. Met    ROM: Pt will demonstrate a negative Finkelstein test on BUE in order to improve ability to grasp the UEs with minimal discomfort. Not met bilaterally,greater on the R   Strength: Pt will demonstrate ability to complete gripping tasks with less than or equal to 0-1/10 increase in left elbow pain in order to improve tolerance to ADLs. Met    Home Exercise Program: Pt will demonstrate independence with HEP. Met    Longer term goals: MEET IN 16 VISITS     Pain: Pt will report less than or equal to 3-4/10 left elbow pain at worst throughout the day in order to improve tolerance to use of UEs for ADLs, housework, and lifting. Ongoing   ROM: Pt will be able to demonstrate full AROM of the UEs with 0/10 pain in the medial and lateral elbow in order to improve use during ADLs and improve activity tolerance. Ongoing   Strength: Pt will demonstrate greater than or equal to 5/5 BUE strength with 0/10 elbow pain with testing and an improvement in left  strength by 5 lbs to improve tolerance to lifting, grasping, and ADL completion. Ongoing   Outcome Score: Pt will improve the UEFS score by greater than or equal to 10% in order to demonstrate an improvement in function. Ongoing      Neck STG: (to be met in 8 treatments)  ? Pain: Pt will report less than or equal to 6-7/10 neck pain at most throughout the day and a decrease in headaches by 25% in order to improve tolerance to ADL completion and improve QOL. MET 9/16  ? ROM: Pt will be able to demonstrate neck rotation to the R and left to greater than or equal to 60 degrees in order to assist scanning the environment and safe driving. MET 9/16 (62° B)  ? Strength: Pt will be able to complete the 10 head lifts off the table with initiating movement from deep neck flexors vs global neck flexors in order to demonstrate neuromuscular re-education and postural awareness/strenght. MET 9/16  ?  Function: Pt will score less than or equal to 50% on the NDI in order to demonstrate improved function on ADLs. MET 9/16 (24% impairment)  Independent with Home Exercise Programs  LTG: (to be met in 16 treatments)  ? Pain: Pt will report less than or equal 4-5/10 neck pain at most throughout the day and a decrease in headaches by 50% in order to improve tolerance to ADL completion and improve QOL. ? ROM: Pt will demonstrate cervical ROM to greater than or equal to 70 degrees rotation (B) and maintain for 10\" and flexion, extension, and SB without pain/reports of tightness in order to improve tolerance to driving and completing ADLs. ? Strength: Pt will demonstrate ability to complete deep neck flexor endurance test for greater than or equal to 38\" without an increase in neck pain in order to demonstrate improved postural strength and control when completing ADLs. ? Function: Pt will score less than or equal to 45% on the NDI in order to demonstrate improved function with ADLs. Patient goals: to feel better, strong without pain       Pt. Education:  [] Yes  [x] No  [] Reviewed Prior HEP/Ed. Method of Education: [] Verbal  [] Demo  [] Written  8/26/21: prone scapular retractions; prone extension, prone h. Abd, prone Ws  Comprehension of Education:  [] Verbalizes understanding. [] Demonstrates understanding. [] Needs review to ensure proper technique. [] Demonstrates/verbalizes HEP/Ed previously given. Plan: [x] Continue current frequency toward long and short term goals.     [x] Specific Instructions for subsequent treatments: ensure scapular mobility, and of the elbow/forearm/hand utilizing hawkgrips and MFR techniques; progress to deep neck flexor and postural strengthening and elbow, forearm, wrist,  strengthening/stretching - as symptoms become less diffuse- trial use of mobilizations to the elbow with gripping to improve  strength      Time In: 3:00 pm             Time Out: 3:53 pm     Electronically signed by:  Sacha Padron, PTA

## 2021-10-04 ENCOUNTER — HOSPITAL ENCOUNTER (OUTPATIENT)
Dept: PHYSICAL THERAPY | Facility: CLINIC | Age: 51
Setting detail: THERAPIES SERIES
Discharge: HOME | End: 2021-10-04
Payer: MEDICARE

## 2021-10-04 PROCEDURE — 97110 THERAPEUTIC EXERCISES: CPT

## 2021-10-04 NOTE — FLOWSHEET NOTE
[] [facility]) [facility] &  Therapy  [address]  [phone]  F: [phone] [x] [address]  [facility] [address]   [address]  P: [phone]  F: [phone] [] [place]  [address]  P: [phone]  F: [phone] [] [address]  P: [phone]  F: [phone] [] [address]  [facility]   [address]   [place]: [phone]  F: [phone]      Physical Therapy Daily Treatment Note    Date:  10/4/2021  Patient Name:  Akash Sal    :  1970  MRN: [id number]  Physician: Jen Ornelas MD                                    Insurance: [employer] ( vs/ year)  Medical Diagnosis: M54.2, G89.29 (ICD-10-CM) - Chronic neck pain; M77.12 (ICD-10-CM) - Left lateral epicondylitis                       Rehab Codes: M54.2, M79.601, M79.602, M62.81  Onset Date: 21                 Next 's appt: 10/12/21  Visit# / total visits: 14     Cancels/No Shows: 0    Subjective:    Pain:  [x] Yes  [] No Location: neck, L UE  Pain Rating: (0-10 scale) 3-5/10  Pain altered Tx:  [x] No  [] Yes  Action:    Comments:  Pt reports that following vacuuming yesterday the pain in his L elbow increased. Pt comments that pain is greater with gripping activities.        Objective:  Modalities: ice massage to R wrist 3'- not 10/04/21   Precautions:  Exercises: bolded completed 10/04/21 [employer] access code [email or web address]   Exercise Reps/ Time Weight/ Level Comments   UBE 3/3 lv2    Manual 19'  Anterior neck         Seated:      pec stretch over stability ball 3x30\"  Manual overpressure    Wrist extensor stretch 3x 30\" Added 10/4         Standing:      Corner pec stretch 3x 30\" hold    Wall push ups 10x2 Lime  Increased resistance 10/4   Serratus wall slides 10x2   Lime  Increased resistance 10/4   Wall angels 10x2       Band biceps curls 2x10 lime    Band front raise 15x lime Increased reps 9/30   Band lateral raise 15x lime Increased reps 9/30               Supine:      Chin tucks 10x 5\"    Deep neck flexion 10x 3-5\" hold    No sirs 2x30\"     Yes sirs 2x30\"     Resisted H. abd with chin tuck 15x Lime  loop Increased resistance 10/4   Shoulder flexion with chin tuck 2x10 Lime  loop Increased resistance 10/4         Prone      Scapular retractions 10x5\"     Scapular depression 10x5\"     Shoulder extension 10x5\" 1# Added weight 10/4   H. Abd 10x5\" 1# Added weight 10/4   W's  10x5\"    1# Added weight 10/4   RAIZA chin tucks 10x 5\"    RAIZA cervical extension 2x10     Special testing:   - neg anterior shear  - neg transverse ligament test (superior)  - Sitting: difficulty palpating C2 to test transverse ligament   - neg sharp's filippo          Treatment Charges: Mins Units   []  Modalities- ice massage      [x]  Ther Exercise 50 3   []  Manual Therapy     []  Ther Activities     []  Aquatics     []  Vasocompression     []  Neuro     Total Treatment time 50 3       Assessment: [x] Progressing toward goals. Progressed exercises with increased resistance with good tolerance. Pt reports increased discomfort in his L elbow with forward raises using tband. No other exercises exasperated symptoms at this time. Added a stretch for L wrist extensors to address pain complaints. Pt with notable posture improvements. Requiring less cuing to improve posture while in PT.     [] No change. [] Other:   [x] Patient would continue to benefit from skilled physical therapy services in order to: address neck and BUE pain along with mobility issues in order to improve activity tolerance and QOL. Problems:    [x] ? Pain: neck and LUE pain   [x] ? ROM: decreased neck rotation and decreased flexibility in UEs  [x] ?  Strength: decreased and painful  strength; decreased deep neck flexor endurance  [x] ? Function: UEFS: 32.5% impairment; NDI: 56% impairment         UEs Short term goals: MEET IN 8 VISITS- as of 8/30/21 Status   Pain: Pt will report less than or equal to 5-6/10 left elbow pain at worst throughout the day in order to improve tolerance to use of UEs for ADLs, housework, and lifting. Met    ROM: Pt will demonstrate a negative Finkelstein test on BUE in order to improve ability to grasp the UEs with minimal discomfort. Not met bilaterally,greater on the R   Strength: Pt will demonstrate ability to complete gripping tasks with less than or equal to 0-1/10 increase in left elbow pain in order to improve tolerance to ADLs. Met    Home Exercise Program: Pt will demonstrate independence with HEP. Met    Longer term goals: MEET IN 16 VISITS     Pain: Pt will report less than or equal to 3-4/10 left elbow pain at worst throughout the day in order to improve tolerance to use of UEs for ADLs, housework, and lifting. Ongoing   ROM: Pt will be able to demonstrate full AROM of the UEs with 0/10 pain in the medial and lateral elbow in order to improve use during ADLs and improve activity tolerance. Ongoing   Strength: Pt will demonstrate greater than or equal to 5/5 BUE strength with 0/10 elbow pain with testing and an improvement in left  strength by 5 lbs to improve tolerance to lifting, grasping, and ADL completion. Ongoing   Outcome Score: Pt will improve the UEFS score by greater than or equal to 10% in order to demonstrate an improvement in function. Ongoing      Neck STG: (to be met in 8 treatments)  1. ? Pain: Pt will report less than or equal to 6-7/10 neck pain at most throughout the day and a decrease in headaches by 25% in order to improve tolerance to ADL completion and improve QOL.  MET 9/16  2. ? ROM: Pt will be able to demonstrate neck rotation to the R and left to greater than or equal to 60 degrees in order to assist scanning the environment and safe driving. MET 9/16 (62° B)  3. ? Strength: Pt will be able to complete the 10 head lifts off the table with initiating movement from deep neck flexors vs global neck flexors in order to demonstrate neuromuscular re-education and postural awareness/strenght. MET 9/16  4. ? Function: Pt will score less than or equal to 50% on the NDI in order to demonstrate improved function on ADLs. MET 9/16 (24% impairment)  5. Independent with Home Exercise Programs  LTG: (to be met in 16 treatments)  1. ? Pain: Pt will report less than or equal 4-5/10 neck pain at most throughout the day and a decrease in headaches by 50% in order to improve tolerance to ADL completion and improve QOL. 2. ? ROM: Pt will demonstrate cervical ROM to greater than or equal to 70 degrees rotation (B) and maintain for 10\" and flexion, extension, and SB without pain/reports of tightness in order to improve tolerance to driving and completing ADLs. 3. ? Strength: Pt will demonstrate ability to complete deep neck flexor endurance test for greater than or equal to 38\" without an increase in neck pain in order to demonstrate improved postural strength and control when completing ADLs. 4. ? Function: Pt will score less than or equal to 45% on the NDI in order to demonstrate improved function with ADLs. Patient goals: to feel better, strong without pain       Pt. Education:  [x] Yes  [] No  [x] Reviewed Prior HEP/Ed. Method of Education: [x] Verbal- avoiding aggravators to elbow and repetitive use  [] Demo  [] Written  8/26/21: prone scapular retractions; prone extension, prone h. Abd, prone Ws  Comprehension of Education:  [] Verbalizes understanding. [] Demonstrates understanding. [x] Needs review  [] Demonstrates/verbalizes HEP/Ed previously given. Plan: [x] Continue current frequency toward long and short term goals.     [x] Specific Instructions for subsequent treatments: ensure scapular mobility, and of the

## 2021-10-11 ENCOUNTER — HOSPITAL ENCOUNTER (OUTPATIENT)
Dept: PHYSICAL THERAPY | Facility: CLINIC | Age: 51
Setting detail: THERAPIES SERIES
Discharge: HOME | End: 2021-10-11
Payer: MEDICARE

## 2021-10-11 PROCEDURE — 97110 THERAPEUTIC EXERCISES: CPT

## 2021-10-11 NOTE — PROGRESS NOTES
[] [facility]) - [facility] &  Therapy  [address]  [phone]  F: [phone] [x] [address]  [facility]   [address]  P: [phone]  F: [phone] [] [address] &  Therapy  [address]  P: [phone]  F: [phone] [] [address]  P: [phone]  F: [phone] [] [address]  [facility]   [address]   [place]: [phone]  F: [phone]      Physical Therapy Progress Note    Date: 10/11/2021      Patient: Destini Abreu  : 1970  MRN: [id number]   Physician: Cee Seth MD                                    Insurance: [employer] (30 vs/ year)  Medical Diagnosis: M54.2, G89.29 (ICD-10-CM) - Chronic neck pain; M77.12 (ICD-10-CM) - Left lateral epicondylitis                       Rehab Codes: M54.2, M79.601, M79.602, M62.81  Onset Date: 21                 Next 's appt: 10/12/21  Visit# / total visits: 15/16                                Cancels/No Shows: 0  Date range of services: 21 to 10/11/21     Subjective  Pain:  [x]? Yes  []? No   Location: neck, L UE               Pain Rating: (0-10 scale)1/10  Pain altered Tx:  [x]? No  []? Yes  Action:     Comments:  Pt reports minimal pain in his neck and UE this date. Pt notes he had a good day yesterday. Pt states he did try to clean the windshield wiper at the gas station yesterday and this flared up his R wrist pain. Pt states the pain was of a delayed onset. Pt also notes he continues to have some difficulty writing, cutting with scissors, and completing repetitive activities with his brace off. Pt also reports he continues to have the feeling of his neck getting stuck if he looks in a direction for a longer period of time. Objective:  Re-assessment 10/12         *= PAIN ROM  °A/P STRENGTH   AROM Left Right Left Right   Cervical flexion 60  tight [facility] WFL   Extension  53  tight [facility] WFL   Rotation  60  pain 73 WFL WFL   Sidebend 40    30       Thoracic rotation limited  WFL       Flexion Symmetrical 5 5-   ABD (0-180) Symmetrical  5 5   ER @ 0 45 90 (0-90) Symmetrical   Pain on left side 5 5   IR (0-90) Symmetrical  5* MILD medial elbow 5   Elbow Flex. (0-150)     5 5   Elbow Ext. (5/10-0)     5 5   Pronation (0-90)     5 5   Supination (0-90)     5 5   Wrist Flex. (0-80)     5    5   Wrist Ext. (0-70)     5  Some pain on dorsum 5 some pain on dorsum   Rad. Dev. (0-20)     5 5   Ulnar Dev. (0-30)     5 5    (elbow bent)     63 lbs 53 lbs   Deep neck flexor endurance test: 32\"  + Finkelstein on the R     NDI: 44% impairment (12% change)  UEFS: 50/80, 37.5% max impairment (no change)         Assessment:  Taylor Contreras has completed 15/16 physical therapy visits with improvements in neck pain and AROM. Pt continues to have reports of instability and locking in the cervical spine, especially when the head is held in a position for an extended period of time. Pt is observed to tilt head into an extension position while filling out paperwork and after sitting with head turned for a short period of time. Pt does continue to demonstrate some motor coordination deficits of the cervical spine along with the scapulothoracic region. Pt with crepitus and decreased muscle coordination on the left with aberrant movements noted. Pt continues to have some UE pain in the elbows with the R being more symptomatic than the left, particularly in the deQuervain's region. Pt continues to demosntrate an overall decrease in function in terms of ADLs and household tasks secondary to decreased activity tolerance. Pt will benefit from continued therapy to increase overall functional strength.  Further evaluation for stability of the cervical spine is suggested at this time based on mechanism of old injury and verbal complaints of catching in his neck. []? No change. [x]? Other: Exercises continue to be completed with emphasis on motor control and increasing activity tolerance. Intermittent cueing to correct head/neck positioning when completing cervical retractions is provided. [x]? Patient would continue to benefit from skilled physical therapy services in order to: address neck and BUE pain along with mobility issues in order to improve activity tolerance and QOL. UEs Short term goals: MEET IN 8 VISITS- as of 8/30/21 Status   Pain: Pt will report less than or equal to 5-6/10 left elbow pain at worst throughout the day in order to improve tolerance to use of UEs for ADLs, housework, and lifting. Met    ROM: Pt will demonstrate a negative Finkelstein test on BUE in order to improve ability to grasp the UEs with minimal discomfort.  Not met bilaterally,greater on the R   Strength: Pt will demonstrate ability to complete gripping tasks with less than or equal to 0-1/10 increase in left elbow pain in order to improve tolerance to ADLs. Met    Home Exercise Program: Pt will demonstrate independence with HEP. Met    Longer term goals: MEET IN 16 VISITS     Pain: Pt will report less than or equal to 3-4/10 left elbow pain at worst throughout the day in order to improve tolerance to use of UEs for ADLs, housework, and lifting.  Ongoing 10/11   ROM: Pt will be able to demonstrate full AROM of the UEs with 0/10 pain in the medial and lateral elbow in order to improve use during ADLs and improve activity tolerance.  MET 10/11 except for finkelsteins   Strength: Pt will demonstrate greater than or equal to 5/5 BUE strength with 0/10 elbow pain with testing and an improvement in left  strength by 5 lbs to improve tolerance to lifting, grasping, and ADL completion.  MET 10/11   Outcome Score: Pt will improve the UEFS score by greater than or equal to 10% in order to demonstrate an improvement in function. Ongoing 10/11      Neck STG: (to be met in 8 treatments)  1. ? Pain: Pt will report less than or equal to 6-7/10 neck pain at most throughout the day and a decrease in headaches by 25% in order to improve tolerance to ADL completion and improve QOL. MET 9/16  2. ? ROM: Pt will be able to demonstrate neck rotation to the R and left to greater than or equal to 60 degrees in order to assist scanning the environment and safe driving. MET 9/16 (62° B)  3. ? Strength: Pt will be able to complete the 10 head lifts off the table with initiating movement from deep neck flexors vs global neck flexors in order to demonstrate neuromuscular re-education and postural awareness/strenght. MET 9/16  4. ? Function: Pt will score less than or equal to 50% on the NDI in order to demonstrate improved function on ADLs. MET 9/16 (24% impairment)  5. Independent with Home Exercise Programs  LTG: (to be met in 16 treatments)  1. ? Pain: Pt will report less than or equal 4-5/10 neck pain at most throughout the day and a decrease in headaches by 50% in order to improve tolerance to ADL completion and improve QOL.  Intermittently met 10/11  2. ? ROM: Pt will demonstrate cervical ROM to greater than or equal to 70 degrees rotation (B) and maintain for 10\" and flexion, extension, and SB without pain/reports of tightness in order to improve tolerance to driving and completing ADLs. Ongoing 10/11 (looking to the left for short period of time continues to increase neck pain)  3. ? Strength: Pt will demonstrate ability to complete deep neck flexor endurance test for greater than or equal to 38\" without an increase in neck pain in order to demonstrate improved postural strength and control when completing ADLs. Ongoing 10/11  4. ? Function: Pt will score less than or equal to 45% on the NDI in order to demonstrate improved function with ADLs.  MET 10/11                    Patient goals: to feel better, strong without pain       Treatment Plan:  [x] Therapeutic Exercise   09363  [] Iontophoresis: 4 mg/mL Dexamethasone Sodium Phosphate  mAmin  47861   [] Therapeutic Activity  39251 [] Vasopneumatic cold with compression  59997    [] Gait Training   86050 [] Ultrasound   13295   [] Neuromuscular Re-education  39289 [] Electrical Stimulation Unattended  24234   [x] Manual Therapy  71938 [] Electrical Stimulation Attended  02583   [x] Instruction in HEP  [] Lumbar/Cervical Traction  51445   [] Aquatic Therapy   34741 [x] Cold/hotpack    [] Massage   67221      [] Dry Needling, 1 or 2 muscles  39599   [] Biofeedback, first 15 minutes   26691  [] Biofeedback, additional 15 minutes   31977 [] Dry Needling, 3 or more muscles  77954       Patient Status:     [x] Continue per initial plan of care. [x] Additional visits necessary. [] Other:     Requested Frequency/Duration: 1-2 times per week for 6-8 additional treatments. 22-24 total visits         Electronically signed by Heather Ruedar, JACKY on 10/11/2021 at 7:07 PM      If you have any questions or concerns, please don't hesitate to call. Thank you for your referral.    Physician Signature:________________________________Date:__________________  By signing above or cosigning this note, I have reviewed this plan of care and certify a need for medically necessary rehabilitation services.      *PLEASE SIGN ABOVE AND FAX BACK ALL PAGES*

## 2021-10-11 NOTE — FLOWSHEET NOTE
[] [facility]) - [facility] &  Therapy  [address]  [phone]  F: [phone] [x] [address]  [facility]   [address]  P: [phone]  F: [phone] [] [address] &  Therapy  [address]  P: [phone]  F: [phone] [] [address]  P: [phone]  F: [phone] [] [address]  [facility]   [address]   [place]: [phone]  F: [phone]      Physical Therapy Daily Treatment Note    Date:  10/11/2021  Patient Name:  Minda Yap    :  1970  MRN: [id number]  Physician: Zonia Greene MD                                    Insurance: [employer] (30 vs/ year)  Medical Diagnosis: M54.2, G89.29 (ICD-10-CM) - Chronic neck pain; M77.12 (ICD-10-CM) - Left lateral epicondylitis                       Rehab Codes: M54.2, M79.601, M79.602, M62.81  Onset Date: 21                 Next 's appt: 10/12/21  Visit# / total visits: 15/16     Cancels/No Shows: 0    Subjective:    Pain:  [x] Yes  [] No Location: neck, L UE  Pain Rating: (0-10 scale)1/10  Pain altered Tx:  [x] No  [] Yes  Action:    Comments:  Pt reports minimal pain in his neck and UE this date. Pt notes he had a good day yesterday. Pt states he did try to clean the windshield wiper at the gas station yesterday and this flared up his R wrist pain. Pt states the pain was of a delayed onset. Pt also notes he continues to have some difficulty writing, cutting with scissors, and completing repetitive activities with his brace off. Pt also reports he continues to have the feeling of his neck getting stuck if he looks in a direction for a longer period of time.        Objective:  Modalities:   Precautions:  Exercises: bolded completed 10/11/21 impairment (12% change)  UEFS: 50/80, 37.5% max impairment (no change)        Treatment Charges: Mins Units   []  Modalities- ice massage      [x]  Ther Exercise 45 3   []  Manual Therapy     []  Ther Activities     []  Aquatics     []  Vasocompression     []  Neuro     Total Treatment time 45 3       Assessment: [x] Progressing toward goals. Isaac Self has completed 15/16 physical therapy visits with improvements in neck pain and AROM. Pt continues to have reports of instability and locking in the cervical spine, especially when the head is held in a position for an extended period of time. Pt is observed to tilt head into an extension position while filling out paperwork and after sitting with head turned for a short period of time. Pt does continue to demonstrate some motor coordination deficits of the cervical spine along with the scapulothoracic region. Pt with crepitus and decreased muscle coordination on the left with aberrant movements noted. Pt continues to have some UE pain in the elbows with the R being more symptomatic than the left, particularly in the deQuervain's region. Pt continues to demosntrate an overall decrease in function in terms of ADLs and household tasks secondary to decreased activity tolerance. Pt will benefit from continued therapy to increase overall functional strength. Further evaluation for stability of the cervical spine is suggested at this time based on mechanism of old injury and verbal complaints of catching in his neck. [] No change. [x] Other: Exercises continue to be completed with emphasis on motor control and increasing activity tolerance. Intermittent cueing to correct head/neck positioning when completing cervical retractions is provided. [x] Patient would continue to benefit from skilled physical therapy services in order to: address neck and BUE pain along with mobility issues in order to improve activity tolerance and QOL. Problems:    [x] ?  Pain: neck and LUE pain   [x] ? ROM: decreased neck rotation and decreased flexibility in UEs  [x] ? Strength: decreased and painful  strength; decreased deep neck flexor endurance  [x] ? Function: UEFS: 32.5% impairment; NDI: 56% impairment         UEs Short term goals: MEET IN 8 VISITS- as of 8/30/21 Status   Pain: Pt will report less than or equal to 5-6/10 left elbow pain at worst throughout the day in order to improve tolerance to use of UEs for ADLs, housework, and lifting. Met    ROM: Pt will demonstrate a negative Finkelstein test on BUE in order to improve ability to grasp the UEs with minimal discomfort. Not met bilaterally,greater on the R   Strength: Pt will demonstrate ability to complete gripping tasks with less than or equal to 0-1/10 increase in left elbow pain in order to improve tolerance to ADLs. Met    Home Exercise Program: Pt will demonstrate independence with HEP. Met    Longer term goals: MEET IN 16 VISITS     Pain: Pt will report less than or equal to 3-4/10 left elbow pain at worst throughout the day in order to improve tolerance to use of UEs for ADLs, housework, and lifting. Ongoing 10/11   ROM: Pt will be able to demonstrate full AROM of the UEs with 0/10 pain in the medial and lateral elbow in order to improve use during ADLs and improve activity tolerance. MET 10/11 except for finkelsteins   Strength: Pt will demonstrate greater than or equal to 5/5 BUE strength with 0/10 elbow pain with testing and an improvement in left  strength by 5 lbs to improve tolerance to lifting, grasping, and ADL completion. MET 10/11   Outcome Score: Pt will improve the UEFS score by greater than or equal to 10% in order to demonstrate an improvement in function.  Ongoing 10/11      Neck STG: (to be met in 8 treatments)  1. ? Pain: Pt will report less than or equal to 6-7/10 neck pain at most throughout the day and a decrease in headaches by 25% in order to improve tolerance to ADL completion and angels, serratus slides, push ups, front raises, lateral raises  Comprehension of Education:  [x] Verbalizes understanding. [] Demonstrates understanding. [x] Needs review  [] Demonstrates/verbalizes HEP/Ed previously given. Plan: [x] Continue current frequency toward long and short term goals.     [x] Specific Instructions for subsequent treatments: ensure scapular mobility, and of the elbow/forearm/hand utilizing hawkgrips and MFR techniques; progress to deep neck flexor and postural strengthening and elbow, forearm, wrist,  strengthening/stretching - as symptoms become less diffuse- trial use of mobilizations to the elbow with gripping to improve  strength      Time In: 3:00 pm             Time Out: 358 pm     Electronically signed by: Dante Gillespie PT

## 2021-10-12 ENCOUNTER — OFFICE VISIT (OUTPATIENT)
Dept: PHYSICAL MEDICINE AND REHAB | Age: 51
End: 2021-10-12
Payer: MEDICARE

## 2021-10-12 VITALS
SYSTOLIC BLOOD PRESSURE: 114 MMHG | HEIGHT: 68 IN | DIASTOLIC BLOOD PRESSURE: 75 MMHG | WEIGHT: 202.8 LBS | TEMPERATURE: 98.1 F | BODY MASS INDEX: 30.74 KG/M2 | HEART RATE: 86 BPM

## 2021-10-12 DIAGNOSIS — G89.29 CHRONIC NECK PAIN: Primary | ICD-10-CM

## 2021-10-12 DIAGNOSIS — M25.522 LEFT ELBOW PAIN: ICD-10-CM

## 2021-10-12 DIAGNOSIS — M54.2 CHRONIC NECK PAIN: Primary | ICD-10-CM

## 2021-10-12 PROCEDURE — 1036F TOBACCO NON-USER: CPT | Performed by: STUDENT IN AN ORGANIZED HEALTH CARE EDUCATION/TRAINING PROGRAM

## 2021-10-12 PROCEDURE — G8427 DOCREV CUR MEDS BY ELIG CLIN: HCPCS | Performed by: STUDENT IN AN ORGANIZED HEALTH CARE EDUCATION/TRAINING PROGRAM

## 2021-10-12 PROCEDURE — G8484 FLU IMMUNIZE NO ADMIN: HCPCS | Performed by: STUDENT IN AN ORGANIZED HEALTH CARE EDUCATION/TRAINING PROGRAM

## 2021-10-12 PROCEDURE — 3017F COLORECTAL CA SCREEN DOC REV: CPT | Performed by: STUDENT IN AN ORGANIZED HEALTH CARE EDUCATION/TRAINING PROGRAM

## 2021-10-12 PROCEDURE — G8417 CALC BMI ABV UP PARAM F/U: HCPCS | Performed by: STUDENT IN AN ORGANIZED HEALTH CARE EDUCATION/TRAINING PROGRAM

## 2021-10-12 PROCEDURE — 99214 OFFICE O/P EST MOD 30 MIN: CPT | Performed by: STUDENT IN AN ORGANIZED HEALTH CARE EDUCATION/TRAINING PROGRAM

## 2021-10-12 RX ORDER — CYCLOBENZAPRINE HCL 5 MG
1 TABLET ORAL 3 TIMES DAILY
COMMUNITY
Start: 2021-08-23 | End: 2022-03-01 | Stop reason: SDUPTHER

## 2021-10-12 NOTE — PROGRESS NOTES
[facility]  [facility] PHYSICAL MEDICINE AND REHABILITATION  [place] [address]  Dept: [phone]  Dept Fax: [phone]    Outpatient Followup Note    Bobby Tomlinson is a 46y.o.-year-old male presenting for follow up of neck pain and left upper limb pain. HPI:     He was last seen on 7/28/21 for initial evaluation of neck pain and left upper limb pain. Since that time, he reports improvement in both areas of pain with physical therapy. He denies any shooting pain from the neck to the bilateral upper limbs as well as any numbness/tingling in the bilateral upper limbs. He has noted a \"clicking\" noise in the neck with movement, particularly with moving from a flexed position to an extended position. Since the last visit, he was evaluated by Dr. Nannette Ron, and at that time, he was having pain in the right wrist related to increased use. Due to pain in the left elbow and forearm, he was trying not to use his left upper limb for tasks as much. Dr. Nannette Ron did an injection for de Quervain's tenosynovitis and prescribed a right wrist brace, which patient states have helped. He does note some weakness in the right wrist with increased pain. He is still having some pain in the left upper limb, which he rates at 1-2 out of 10 with increase to 3-4 out of 10 with making a fist.    He reports continuing to take celebrex, amitriptyline, and as-needed flexeril for pain.       Past Medical History:   Diagnosis Date    ALT (SGPT) level raised 4/6/2015    Bilateral thoracic back pain 9/27/2015    Bronchitis 12/13/2015    Contusion of right knee 3/26/2018    DDD (degenerative disc disease), cervical 4/6/2015    Dyslipidemia 4/6/2015    Dyspepsia 4/6/2015    Fractured toe 06/2020    Herniated cervical disc 4/6/2015    Herpes zoster without complication 2/69/1339    Hypoglycemia 6/1/2016    Hypogonadism in male     Immunization counseling 9/26/2017    Insomnia 9/27/2015    Friends and Family:     Attends Alevism Services:     Active Member of Clubs or Organizations:     Attends Club or Organization Meetings:     Marital Status:    Intimate Partner Violence:     Fear of Current or Ex-Partner:     Emotionally Abused:     Physically Abused:     Sexually Abused:        No Known Allergies    Current Outpatient Medications   Medication Sig Dispense Refill    RESTASIS 0.05 % ophthalmic emulsion       Omega-3 Fatty Acids (FISH OIL) 1000 MG CAPS Take 3,000 mg by mouth 3 times daily      Glucosamine-Chondroit-Vit C-Mn (GLUCOSAMINE 1500 COMPLEX PO) Take by mouth      Multiple Vitamins-Minerals (THERAPEUTIC MULTIVITAMIN-MINERALS) tablet Take 1 tablet by mouth daily      amitriptyline (ELAVIL) 75 MG tablet Take 1 tablet by mouth 2 times daily 60 tablet 3    celecoxib (CELEBREX) 50 MG capsule Take 1 capsule by mouth daily TAKE ONE CAPSULE BY MOUTH DAILY 60 capsule 3    Cholecalciferol (VITAMIN D3) 125 MCG (5000 UT) CAPS Take 1 capsule by mouth Daily 30 capsule 3    omeprazole (PRILOSEC) 40 MG delayed release capsule TAKE ONE CAPSULE BY MOUTH DAILY 30 capsule 3    fluticasone (FLONASE) 50 MCG/ACT nasal spray 1 spray by Nasal route daily 1 each 3    cyclobenzaprine (FLEXERIL) 5 MG tablet Take 1 tablet by mouth 3 times daily      diclofenac sodium (VOLTAREN) 1 % GEL Apply topically 2 times daily (Patient not taking: Reported on 10/12/2021) 150 g 1     No current facility-administered medications for this visit. Subjective:      Review of Systems   Musculoskeletal: Positive for arthralgias and neck pain. Neurological: Positive for weakness. Negative for numbness. Objective:     Physical Exam  /75   Pulse 86   Temp 98.1 °F (36.7 °C) (Temporal)   Ht 5' 8\" (1.727 m)   Wt 202 lb 12.8 oz (92 kg)   BMI 30.84 kg/m²     Constitutional: He appears well-developed and well-nourished. In no distress. HEENT: NCAT, EOMI. Hearing grossly intact.   Pulmonary/Chest: Respirations WNL and unlabored. MSK:  No significant tenderness to palpation of the neck or bilateral trapezius muscles. Normal cervical spine ROM; pain with left rotation. Strength 5/5 in bilateral upper limbs. Pain with resisted pronation of the left wrist.  Neurological: He is alert and oriented to person, place, and time. Sensation to light touch intact in bilateral upper limbs. Skin: Skin is warm dry and intact with good turgor. Psychiatric: He has a normal mood and affect. His behavior is normal. Thought content normal.    Nursing note and vitals reviewed. Assessment:       Diagnosis Orders   1. Chronic neck pain  XR CERVICAL SPINE (4-5 VIEWS)   2. Left elbow pain          Plan:      - Continue outpatient PT and home exercise program  - Placed orders for x-rays of the cervical spine including flexion and extension views to evaluate for any instability in the setting of \"clicking\" when moving from a flexed position to an extended position  - Follow up with Dr. Louis Waller as directed      Orders Placed This Encounter   Procedures    XR CERVICAL SPINE (4-5 VIEWS)     Standing Status:   Future     Number of Occurrences:   1     Standing Expiration Date:   10/12/2022     Order Specific Question:   Reason for exam:     Answer:   Patient with chronic neck pain and a \"clicking\" feeling/sound with extension of the neck, please evaluate for any abnormality including instability - please include flexion and extension views       Return in about 6 weeks (around 11/23/2021).        Electronically signed by Nira Kanner, MD on 10/21/2021 at 11:42 PM.

## 2021-10-13 ENCOUNTER — HOSPITAL ENCOUNTER (OUTPATIENT)
Age: 51
Discharge: HOME | End: 2021-10-15
Payer: MEDICARE

## 2021-10-13 ENCOUNTER — HOSPITAL ENCOUNTER (OUTPATIENT)
Dept: GENERAL RADIOLOGY | Age: 51
Discharge: HOME | End: 2021-10-15
Payer: MEDICARE

## 2021-10-13 DIAGNOSIS — G89.29 CHRONIC NECK PAIN: ICD-10-CM

## 2021-10-13 DIAGNOSIS — M54.2 CHRONIC NECK PAIN: ICD-10-CM

## 2021-10-13 PROCEDURE — 72050 X-RAY EXAM NECK SPINE 4/5VWS: CPT

## 2021-10-19 ENCOUNTER — HOSPITAL ENCOUNTER (OUTPATIENT)
Dept: PHYSICAL THERAPY | Facility: CLINIC | Age: 51
Setting detail: THERAPIES SERIES
Discharge: HOME | End: 2021-10-19
Payer: MEDICARE

## 2021-10-19 PROCEDURE — 97140 MANUAL THERAPY 1/> REGIONS: CPT

## 2021-10-19 PROCEDURE — 97110 THERAPEUTIC EXERCISES: CPT

## 2021-10-19 NOTE — FLOWSHEET NOTE
[] [facility]) [facility]  [facility]  [address]  [phone]  F: [phone] [x] [address] [facility] [place] [address]   [address]  P: [phone]  F: [phone] [] [address] [facility]  Therapy  [address]  P: [phone]  F: [phone] [] [address]  P: [phone]  F: [phone] [] [address]  [facility]   [address]   [place]: [phone]  F: [phone]      Physical Therapy Daily Treatment Note    Date:  10/19/2021  Patient Name:  Rola Cahng    :  1970  MRN: [id number]  Physician: Viviana Rhoades MD                                    Insurance: [employer] (30 vs/lucas year)  Medical Diagnosis: M54.2, G89.29 (ICD-10-CM) - Chronic neck pain; M77.12 (ICD-10-CM) - Left lateral epicondylitis                       Rehab Codes: M54.2, M79.601, M79.602, M62.81  Onset Date: 21                 Next 's appt: 10/12/21  Visit# / total visits: -     Cancels/No Shows: 0    Subjective:    Pain:  [x] Yes  [] No Location: neck, L UE  Pain Rating: (0-10 scale) not quantified/10  Pain altered Tx:  [x] No  [] Yes  Action:    Comments:  Pt reports the referring physician wants him to continue therapy. Pt notes he has some stiffness on the left side of his neck when he is looking over to the left.       Objective:  Modalities:   Precautions:  Exercises: bolded completed 10/19/21 StumbleUpon access code [email or web address]   Exercise Reps/ Time Weight/ Level Comments   UBE 6' lv2 Switching every minute          Seated:      Wrist extension 2x10 ea 1#     Wrist flexion 2x10 ea 1#     Wrist maze 2x ea     Cotton ball  5x ea hand Red clip  6 cotton balls    Wrist roller  5x 1#    Forearm supination/pronation 2x10 ea 2 plates at distal end    pec stretch over stability ball 3x30\"  Manual overpressure    Wrist extensor stretch 3x 30\" Added 10/4         Standing:      Corner pec stretch 3x 30\" hold    DB matrix 5x ea 1# ea    Wall push ups 10x2 Lime  Increased resistance 10/4   Serratus wall slides 10x2   Lime  Increased resistance 10/4   Wall angels 10x2       Band biceps curls 2x10 lime    DBfront raise 15x lime Increased reps 9/30   DB lateral raise 15x lime Increased reps 9/30   Drive the bus 6K12 lime    Shoulder shrugs 2x10 ea 5# Focus on ecc. control         Supine:      Manual  9'  SOR- primarily left side   Neck rotations  10x ea     Chin tucks 10x 5\"    Deep neck flexion 10x 3-5\" hold    No sirs 2x30\"     Yes sirs 2x30\"     Resisted H. abd with chin tuck 15x Lime  loop Increased resistance 10/4   Shoulder flexion with chin tuck 2x10 Lime  loop Increased resistance 10/4         Prone   On total gym   Scapular retractions 10x5\"     Scapular depression 10x5\"     Shoulder extension 20x 1# Added weight 10/4   ROWS 2X10 1#    H. Abd 2x8 1# Added weight 10/4   Y's 2x8 A    W's  10x5\"    1# Added weight 10/4   RAIZA chin tucks 10x 5\"    RAIZA cervical extension 2x10           Treatment Charges: Mins Units   []  Modalities- ice massage      [x]  Ther Exercise 40 2   [x]  Manual Therapy 9 1   []  Ther Activities     []  Aquatics     []  Vasocompression     []  Neuro     Total Treatment time 49 3       Assessment: [x] Progressing toward goals. Pt denies an increase in neck or arm pain following the session. Reintroduced forearm and wrist strengthening exercises to increase tolerance to ADLs and housework. Pt with complaints of left sided neck pain when turning head to the left. Manual therapy to this region followed by AROM cervical rotation with positive result. Progressed resistance and reps with prone scapular strengthening to improve scapulohumeral mechanics with shoulder elevation.  Pt able to complete dumbbell matrix with good execution, however, cueing to ensure head alignment is provided. Pt is observed to look to the side of movement and cueing to maintain neutral head position is encouraged. Will continue to progress functional strength per pt tolerance. [] No change. [] Other:  [x] Patient would continue to benefit from skilled physical therapy services in order to: address neck and BUE pain along with mobility issues in order to improve activity tolerance and QOL. Problems:    [x] ? Pain: neck and LUE pain   [x] ? ROM: decreased neck rotation and decreased flexibility in UEs  [x] ? Strength: decreased and painful  strength; decreased deep neck flexor endurance  [x] ? Function: UEFS: 32.5% impairment; NDI: 56% impairment         UEs Short term goals: MEET IN 8 VISITS- as of 8/30/21 Status   Pain: Pt will report less than or equal to 5-6/10 left elbow pain at worst throughout the day in order to improve tolerance to use of UEs for ADLs, housework, and lifting. Met    ROM: Pt will demonstrate a negative Finkelstein test on BUE in order to improve ability to grasp the UEs with minimal discomfort. Not met bilaterally,greater on the R   Strength: Pt will demonstrate ability to complete gripping tasks with less than or equal to 0-1/10 increase in left elbow pain in order to improve tolerance to ADLs. Met    Home Exercise Program: Pt will demonstrate independence with HEP. Met    Longer term goals: MEET IN 16 VISITS     Pain: Pt will report less than or equal to 3-4/10 left elbow pain at worst throughout the day in order to improve tolerance to use of UEs for ADLs, housework, and lifting. Ongoing 10/11   ROM: Pt will be able to demonstrate full AROM of the UEs with 0/10 pain in the medial and lateral elbow in order to improve use during ADLs and improve activity tolerance.   MET 10/11 except for finkelsteins   Strength: Pt will demonstrate greater than or equal to 5/5 BUE strength with 0/10 elbow pain with testing and an improvement in left  strength by 5 lbs to improve tolerance to lifting, grasping, and ADL completion. MET 10/11   Outcome Score: Pt will improve the UEFS score by greater than or equal to 10% in order to demonstrate an improvement in function. Ongoing 10/11      Neck STG: (to be met in 8 treatments)  1. ? Pain: Pt will report less than or equal to 6-7/10 neck pain at most throughout the day and a decrease in headaches by 25% in order to improve tolerance to ADL completion and improve QOL. MET 9/16  2. ? ROM: Pt will be able to demonstrate neck rotation to the R and left to greater than or equal to 60 degrees in order to assist scanning the environment and safe driving. MET 9/16 (62° B)  3. ? Strength: Pt will be able to complete the 10 head lifts off the table with initiating movement from deep neck flexors vs global neck flexors in order to demonstrate neuromuscular re-education and postural awareness/strenght. MET 9/16  4. ? Function: Pt will score less than or equal to 50% on the NDI in order to demonstrate improved function on ADLs. MET 9/16 (24% impairment)  5. Independent with Home Exercise Programs  LTG: (to be met in 16 treatments)  1. ? Pain: Pt will report less than or equal 4-5/10 neck pain at most throughout the day and a decrease in headaches by 50% in order to improve tolerance to ADL completion and improve QOL. Intermittently met 10/11  2. ? ROM: Pt will demonstrate cervical ROM to greater than or equal to 70 degrees rotation (B) and maintain for 10\" and flexion, extension, and SB without pain/reports of tightness in order to improve tolerance to driving and completing ADLs. Ongoing 10/11 (looking to the left for short period of time continues to increase neck pain)  3. ? Strength: Pt will demonstrate ability to complete deep neck flexor endurance test for greater than or equal to 38\" without an increase in neck pain in order to demonstrate improved postural strength and control when completing ADLs.  Ongoing 10/11  4. ? Function: Pt will score less than or equal to 45% on the NDI in order to demonstrate improved function with ADLs. MET 10/11                    Patient goals: to feel better, strong without pain       Pt. Education:  [x] Yes  [] No  [x] Reviewed Prior HEP/Ed. Method of Education: [x] Verbal- avoiding aggravators to elbow and repetitive use  [] Demo  [x] Written  8/26/21: prone scapular retractions; prone extension, prone h. Abd, prone Ws  10/11: wall angels, serratus slides, push ups, front raises, lateral raises  Comprehension of Education:  [x] Verbalizes understanding. [] Demonstrates understanding. [x] Needs review  [] Demonstrates/verbalizes HEP/Ed previously given. Plan: [x] Continue current frequency toward long and short term goals.     [x] Specific Instructions for subsequent treatments: progress weight; PNF patterns      Time In: 3:00 pm             Time Out: 400 pm     Electronically signed by: Ed Garcia PT

## 2021-10-26 ENCOUNTER — OFFICE VISIT (OUTPATIENT)
Dept: ORTHOPEDIC SURGERY | Age: 51
End: 2021-10-26
Payer: MEDICARE

## 2021-10-26 VITALS — DIASTOLIC BLOOD PRESSURE: 88 MMHG | SYSTOLIC BLOOD PRESSURE: 132 MMHG | HEART RATE: 110 BPM

## 2021-10-26 DIAGNOSIS — M65.4 DE QUERVAIN'S TENOSYNOVITIS, RIGHT: Primary | ICD-10-CM

## 2021-10-26 PROCEDURE — G8484 FLU IMMUNIZE NO ADMIN: HCPCS | Performed by: FAMILY MEDICINE

## 2021-10-26 PROCEDURE — 20550 NJX 1 TENDON SHEATH/LIGAMENT: CPT | Performed by: FAMILY MEDICINE

## 2021-10-26 PROCEDURE — 3017F COLORECTAL CA SCREEN DOC REV: CPT | Performed by: FAMILY MEDICINE

## 2021-10-26 PROCEDURE — G8427 DOCREV CUR MEDS BY ELIG CLIN: HCPCS | Performed by: FAMILY MEDICINE

## 2021-10-26 PROCEDURE — 1036F TOBACCO NON-USER: CPT | Performed by: FAMILY MEDICINE

## 2021-10-26 PROCEDURE — 99213 OFFICE O/P EST LOW 20 MIN: CPT | Performed by: FAMILY MEDICINE

## 2021-10-26 PROCEDURE — G8417 CALC BMI ABV UP PARAM F/U: HCPCS | Performed by: FAMILY MEDICINE

## 2021-10-26 RX ORDER — BUPIVACAINE HYDROCHLORIDE 5 MG/ML
1 INJECTION, SOLUTION PERINEURAL ONCE
Status: COMPLETED | OUTPATIENT
Start: 2021-10-26 | End: 2021-10-26

## 2021-10-26 RX ORDER — BETAMETHASONE SODIUM PHOSPHATE AND BETAMETHASONE ACETATE 3; 3 MG/ML; MG/ML
6 INJECTION, SUSPENSION INTRA-ARTICULAR; INTRALESIONAL; INTRAMUSCULAR; SOFT TISSUE ONCE
Status: COMPLETED | OUTPATIENT
Start: 2021-10-26 | End: 2021-10-26

## 2021-10-26 RX ADMIN — BETAMETHASONE SODIUM PHOSPHATE AND BETAMETHASONE ACETATE 6 MG: 3; 3 INJECTION, SUSPENSION INTRA-ARTICULAR; INTRALESIONAL; INTRAMUSCULAR; SOFT TISSUE at 16:34

## 2021-10-26 RX ADMIN — BUPIVACAINE HYDROCHLORIDE 5 MG: 5 INJECTION, SOLUTION PERINEURAL at 16:35

## 2021-10-26 NOTE — PROGRESS NOTES
Sports Medicine Consultation    CHIEF COMPLAINT:  Wrist Pain (Rt wrist f/u. 75% better. still has pain on radial side. injection helped 1m. doing PT)      HPI:  The patient is a 46 y.o. male who is being seen for   established patient being seen for regarding follow up of a pre-existing problem of  r dequervains. The patient is a right hand dominant male who has had right hand/wrist pain for 3 months. As far as trauma to the hand the patient indicates no new trauma. The following medications/interventions have been tried: cortisone, pt, brace with benefit. he has a past medical history of ALT (SGPT) level raised, Bilateral thoracic back pain, Bronchitis, Contusion of right knee, DDD (degenerative disc disease), cervical, Dyslipidemia, Dyspepsia, Fractured toe, Herniated cervical disc, Herpes zoster without complication, Hypoglycemia, Hypogonadism in male, Immunization counseling, Insomnia, Leukocytopenia, unspecified, Leukopenia, Myofascial muscle pain, Need for shingles vaccine, Pain in abdominal muscle of right flank, Pharyngoesophageal dysphagia, Post laminectomy syndrome, Prostate cancer screening, and Rhinosinusitis. he has a past surgical history that includes lumbar laminectomy (); hernia repair (Bilateral, ); Colonoscopy; Endoscopy, colon, diagnostic (); Upper gastrointestinal endoscopy (N/A, 2017); and Cardiac catheterization (). Family history is unknown by patient.     Social History     Socioeconomic History    Marital status:      Spouse name: Not on file    Number of children: Not on file    Years of education: Not on file    Highest education level: Not on file   Occupational History    Not on file   Tobacco Use    Smoking status: Former Smoker     Quit date: 1997     Years since quittin.8    Smokeless tobacco: Never Used   Vaping Use    Vaping Use: Never used   Substance and Sexual Activity    Alcohol use: No    Drug use: No    Sexual activity: Yes     Partners: Female   Other Topics Concern    Not on file   Social History Narrative    Not on file     Social Determinants of Health     Financial Resource Strain:     Difficulty of Paying Living Expenses:    Food Insecurity:     Worried About Running Out of Food in the Last Year:     [address] in the Last Year:    Transportation Needs:     Lack of Transportation (Medical):      Lack of Transportation (Non-Medical):    Physical Activity:     Days of Exercise per Week:     Minutes of Exercise per Session:    Stress:     Feeling of Stress :    Social Connections:     Frequency of Communication with Friends and Family:     Frequency of Social Gatherings with Friends and Family:     Attends Buddhism Services:     Active Member of Clubs or Organizations:     Attends Club or Organization Meetings:     Marital Status:    Intimate Partner Violence:     Fear of Current or Ex-Partner:     Emotionally Abused:     Physically Abused:     Sexually Abused:        Current Outpatient Medications   Medication Sig Dispense Refill    amitriptyline (ELAVIL) 75 MG tablet Take 1 tablet by mouth 2 times daily 60 tablet 3    celecoxib (CELEBREX) 50 MG capsule Take 1 capsule by mouth daily TAKE ONE CAPSULE BY MOUTH DAILY 60 capsule 3    Cholecalciferol (VITAMIN D3) 125 MCG (5000 UT) CAPS Take 1 capsule by mouth Daily 30 capsule 3    omeprazole (PRILOSEC) 40 MG delayed release capsule TAKE ONE CAPSULE BY MOUTH DAILY 30 capsule 3    fluticasone (FLONASE) 50 MCG/ACT nasal spray 1 spray by Nasal route daily 1 each 3    cyclobenzaprine (FLEXERIL) 5 MG tablet Take 1 tablet by mouth 3 times daily      RESTASIS 0.05 % ophthalmic emulsion       Omega-3 Fatty Acids (FISH OIL) 1000 MG CAPS Take 3,000 mg by mouth 3 times daily      Glucosamine-Chondroit-Vit C-Mn (GLUCOSAMINE 1500 COMPLEX PO) Take by mouth      Multiple Vitamins-Minerals (THERAPEUTIC MULTIVITAMIN-MINERALS) tablet Take 1 tablet by mouth daily      diclofenac sodium (VOLTAREN) 1 % GEL Apply topically 2 times daily (Patient not taking: Reported on 10/12/2021) 150 g 1     No current facility-administered medications for this visit. Allergies:  hehas No Known Allergies. ROS:  CV:  Denies chest pain; palpitations; shortness of breath; swelling of feet, ankles; and loss of consciousness. CON: Denies fever and dizziness. ENT:  Denies hearing loss / ringing, ear infections hoarseness, and swallowing problems. RESP:  Denies chronic cough, spitting up blood, and asthma/wheezing. GI: Denies abdominal pain, change in bowel habits, nausea or vomiting, and blood in stools. :  Denies frequent urination, burning or painful urination, blood in the urine, and bladder incontinence. NEURO:  Denies headache, memory loss, sleep disturbance, and tremor or movement disorder. PHYSICAL EXAM:    SKIN:  Intact without rashes, lesions or ulcerations. No obvious deformity or swelling. EYES:  Extraocular muscles intact. MOUTH: Oral mucosa moist.  No perioral lesions. PULM:  Respirations unlabored and regular. VASC:  Capillary refill less than 2 seconds. Hand/Wrist Exam  ROM:  Full flexor and extensor tendon function intact   Finger, hand, and wrist range of motion are WNL  Inspection-Deformity: no  Palpation-Tenderness: 1st dorsal compartment  There is is not thenar and is not interosseous atrophy. Strength- WNL  NEURO: Radial, ulnar, and median nerves are intact to motor and sensory testing. Two point discrimination is less than six mm. There is not decreased sensation to light touch and pinprick in the median and ulnar nerve distribution. CTS: Tinel's test at the wrist is negative. Flexion compression test negative  Phalen's test is negative. Finkelstein's test is positive. Elbow:  Range of motion and strength about the elbow is intact. Tinel's test is negative at the elbow.     Cerv:  Full pain free range of motion with a negative Spurling's test.    RADIOLOGY: No results found. IMPRESSION:     1. De Quervain's tenosynovitis, right        PLAN:   We discussed some of the etiologies and natural histories of     ICD-10-CM    1. De Quervain's tenosynovitis, right  M65.4      We discussed the various treatment alternatives including anti-inflammatory medications, physical therapy, injections, further imaging studies and as a last resort surgery. At this point patient has done very well de Quervain's tenosynovitis injection I do think another 1 is appropriate and after the risk benefits alternatives and procedure itself were discussed. Patient's right wrist was prepped in sterile manner with Betadine skin was then cooled cold spray then recleaned with alcohol prep I then injected 1 mL 6 mg of betamethasone and 1 mL of 0.5% Marcaine patient tolerated procedure well felt immediate pain relief he will continue to wear his splint as ceasing wearing the splint seem to be the inciting event for more pain and follow-up with us as needed. Patient voiced understanding and agreement this plan    No follow-ups on file. Please be aware portions of this note were completed using voice recognition software and unforeseen errors may have occurred    Electronically signed by Sarah Jalloh DO, FAOASM  on 10/26/21 at 3:09 PM EDT      No orders of the defined types were placed in this encounter.

## 2021-10-28 ENCOUNTER — APPOINTMENT (OUTPATIENT)
Dept: PHYSICAL THERAPY | Facility: CLINIC | Age: 51
End: 2021-10-28
Payer: MEDICARE

## 2021-11-04 ENCOUNTER — HOSPITAL ENCOUNTER (OUTPATIENT)
Dept: PHYSICAL THERAPY | Facility: CLINIC | Age: 51
Setting detail: THERAPIES SERIES
Discharge: HOME | End: 2021-11-04
Payer: MEDICARE

## 2021-11-04 PROCEDURE — 97140 MANUAL THERAPY 1/> REGIONS: CPT

## 2021-11-04 PROCEDURE — 97110 THERAPEUTIC EXERCISES: CPT

## 2021-11-04 NOTE — FLOWSHEET NOTE
[] [facility]) - [facility] &  Therapy  [address]  [phone]  F: [phone] [x] [address]  [address] [facility]   [address]  P: [phone]  F: [phone] [] [facility] &  Therapy  [address] [facility]  P: [phone]  F: [phone] [] [address]  P: [phone]  F: [phone] [] [address]  [facility]   [address]   [place]: [phone]  F: [phone]      Physical Therapy Daily Treatment Note    Date:  2021  Patient Name:  Nicky Albert    :  1970  MRN: [id number]  Physician: Erick Whatley MD                                    Insurance: [employer] (30 vs/lucas year)  Medical Diagnosis: M54.2, G89.29 (ICD-10-CM) - Chronic neck pain; M77.12 (ICD-10-CM) - Left lateral epicondylitis                       Rehab Codes: M54.2, M79.601, M79.602, M62.81  Onset Date: 21                 Next 's appt: 10/12/21  Visit# / total visits: -     Cancels/No Shows: 0    Subjective:    Pain:  [x] Yes  [] No Location: neck, L UE  Pain Rating: (0-10 scale) not quantified/10  Pain altered Tx:  [x] No  [] Yes  Action:    Comments:  Pt arrives wearing his wrist brace. Pt reports the neck is bad today. Pt reports he went to the chiropractor 2 weeks ago. Pt notes he can only turn his head a little. Pt notes he has a headache on bilateral temporal regions. Pt reports he has to go to the chiropractor tomorrow. Pt reports the neck pain has gradually gotten worse. Pt notes he hasnt done anything different. Pt notes the neck pain in is in the back of the head and radiates to the front of his head.  Pt reports both sides are pretty equal.     Objective:  Modalities:   Precautions:  Exercises: bolded completed 21 [facility] access code [email or web address]   Exercise Reps/ Time Weight/ Level Comments   UBE 6' lv2 Switching every minute          Seated:      Wrist extension 2x10 ea 1#     Wrist flexion 2x10 ea 1#     Wrist maze 2x ea     Cotton ball  5x ea hand Red clip  6 cotton balls    Wrist roller  5x 1#    Forearm supination/pronation 2x10 ea 2 plates at distal end    pec stretch over stability ball 3x30\"  Manual overpressure    Wrist extensor stretch 3x 30\" Added 10/4         Standing:      Corner pec stretch 3x 30\" hold    Cervical extension isometrics 10x5\"  Towel roll behind head    DB matrix 5x ea 1# ea    Wall push ups 10x2 Lime  Increased resistance 10/4   Serratus wall slides 10x2   Lime  Increased resistance 10/4   Wall angels 10x2       Band biceps curls 2x10 lime    DBfront raise 15x lime Increased reps 9/30   DB lateral raise 15x lime Increased reps 9/30   Drive the bus 1O32 lime    Shoulder shrugs 2x10 ea 5# Focus on ecc. control         Sitting       Cervical rotations  10x ea      Neck flexion/extension  10x ea     Supine:      Manual  21'  SOR- primarily left side   Neck rotations  10x ea     Neck sidebends 10x ea  Tactile cues to control   Chin tucks 10x 5\"  2 sets    Deep neck flexion 10x 3-5\" hold    No sirs 2x30\"     Yes sirs 2x30\"     Resisted H. abd with chin tuck 15x Lime  loop Increased resistance 10/4   Shoulder flexion with chin tuck 2x10 Lime  loop Increased resistance 10/4         sidelying      Open books 10x ea     Prone   On total gym   Scapular retractions 10x5\"     Scapular depression 10x5\"     Shoulder extension 20x 1# Added weight 10/4   ROWS 2X10 1#    H.  Abd 2x8 1# Added weight 10/4   Y's 2x8 A    W's  10x5\"    1# Added weight 10/4   RAIZA chin tucks 10x 5\"    RAIZA cervical extension 2x10           Treatment Charges: Mins Units   []  Modalities- ice massage      [x]  Ther Exercise 28 2   [x]  Manual Therapy 21 1   []  Ther Activities     []  Aquatics     []  Vasocompression     []  Neuro     Total Treatment time 49 3       Assessment: [] Progressing toward goals. [x] No change. Pt arrives with increased head and neck pain. Pt denies the presence of the 5 Ds, 2 A's, and 3 Ns and reports more of a tension type headache. Pt with decreased cervical rotation secondary to pain. Pt with increased hypertonicity of B suboccipital musculature with left > Right. Manual therapy was completed with good releases, however, pt did not report an significant improvement in pain. Pt denies any worsening of pain or headache following manual. Following manual interventions, AROM and muscle activation exercises were completed to improve motor patterns in improved ranges of motion. Pt was able to demonstrate an improvement in cervical spine rotation in supine and sitting. Pt does require cueing to prevent upper cervical spine extension which is noted when laying in supine. Intermittent corrections provided during RAIZA when completing chin tucks. Pt educated on use of a tennis ball for self-suboccipital release at home.     [] Other:  [x] Patient would continue to benefit from skilled physical therapy services in order to: address neck and BUE pain along with mobility issues in order to improve activity tolerance and QOL. Problems:    [x] ? Pain: neck and LUE pain   [x] ? ROM: decreased neck rotation and decreased flexibility in UEs  [x] ? Strength: decreased and painful  strength; decreased deep neck flexor endurance  [x] ? Function: UEFS: 32.5% impairment; NDI: 56% impairment         UEs Short term goals: MEET IN 8 VISITS- as of 8/30/21 Status   Pain: Pt will report less than or equal to 5-6/10 left elbow pain at worst throughout the day in order to improve tolerance to use of UEs for ADLs, housework, and lifting. Met    ROM: Pt will demonstrate a negative Finkelstein test on BUE in order to improve ability to grasp the UEs with minimal discomfort.   Not met bilaterally,greater on the R   Strength: Pt will demonstrate ability to complete gripping tasks with less than or equal to 0-1/10 increase in left elbow pain in order to improve tolerance to ADLs. Met    Home Exercise Program: Pt will demonstrate independence with HEP. Met    Longer term goals: MEET IN 16 VISITS     Pain: Pt will report less than or equal to 3-4/10 left elbow pain at worst throughout the day in order to improve tolerance to use of UEs for ADLs, housework, and lifting. Ongoing 10/11   ROM: Pt will be able to demonstrate full AROM of the UEs with 0/10 pain in the medial and lateral elbow in order to improve use during ADLs and improve activity tolerance. MET 10/11 except for finkelsteins   Strength: Pt will demonstrate greater than or equal to 5/5 BUE strength with 0/10 elbow pain with testing and an improvement in left  strength by 5 lbs to improve tolerance to lifting, grasping, and ADL completion. MET 10/11   Outcome Score: Pt will improve the UEFS score by greater than or equal to 10% in order to demonstrate an improvement in function. Ongoing 10/11      Neck STG: (to be met in 8 treatments)  1. ? Pain: Pt will report less than or equal to 6-7/10 neck pain at most throughout the day and a decrease in headaches by 25% in order to improve tolerance to ADL completion and improve QOL. MET 9/16  2. ? ROM: Pt will be able to demonstrate neck rotation to the R and left to greater than or equal to 60 degrees in order to assist scanning the environment and safe driving. MET 9/16 (62° B)  3. ? Strength: Pt will be able to complete the 10 head lifts off the table with initiating movement from deep neck flexors vs global neck flexors in order to demonstrate neuromuscular re-education and postural awareness/strenght. MET 9/16  4. ? Function: Pt will score less than or equal to 50% on the NDI in order to demonstrate improved function on ADLs. MET 9/16 (24% impairment)  5.  Independent with Home Exercise Programs  LTG: (to be met in 16 treatments)  1. ? Pain: Pt will report less than or equal 4-5/10 neck pain at most throughout the day and a decrease in headaches by 50% in order to improve tolerance to ADL completion and improve QOL. Intermittently met 10/11  2. ? ROM: Pt will demonstrate cervical ROM to greater than or equal to 70 degrees rotation (B) and maintain for 10\" and flexion, extension, and SB without pain/reports of tightness in order to improve tolerance to driving and completing ADLs. Ongoing 10/11 (looking to the left for short period of time continues to increase neck pain)  3. ? Strength: Pt will demonstrate ability to complete deep neck flexor endurance test for greater than or equal to 38\" without an increase in neck pain in order to demonstrate improved postural strength and control when completing ADLs. Ongoing 10/11  4. ? Function: Pt will score less than or equal to 45% on the NDI in order to demonstrate improved function with ADLs. MET 10/11                    Patient goals: to feel better, strong without pain       Pt. Education:  [x] Yes  [] No  [x] Reviewed Prior HEP/Ed. Method of Education: [x] Verbal- avoiding aggravators to elbow and repetitive use  [] Demo  [] Written  8/26/21: prone scapular retractions; prone extension, prone h. Abd, prone Ws  10/11: wall angels, serratus slides, push ups, front raises, lateral raises  Comprehension of Education:  [x] Verbalizes understanding. [] Demonstrates understanding. [x] Needs review  [] Demonstrates/verbalizes HEP/Ed previously given. Plan: [x] Continue current frequency toward long and short term goals.     [x] Specific Instructions for subsequent treatments: progress weight; PNF patterns      Time In: 3:00 pm             Time Out: 401 pm     Electronically signed by: Catina Vasquez, PT

## 2021-11-11 ENCOUNTER — HOSPITAL ENCOUNTER (OUTPATIENT)
Dept: PHYSICAL THERAPY | Facility: CLINIC | Age: 51
Setting detail: THERAPIES SERIES
Discharge: HOME | End: 2021-11-11
Payer: MEDICARE

## 2021-11-11 PROCEDURE — 97110 THERAPEUTIC EXERCISES: CPT

## 2021-11-11 PROCEDURE — 97140 MANUAL THERAPY 1/> REGIONS: CPT

## 2021-11-11 NOTE — FLOWSHEET NOTE
[] [facility]) - [facility] &  Therapy  [address]  [phone]  F: [phone] [x] [address]  [address] [facility]   [address]  P: [phone]  F: [phone] [] 96 [facility] &  Therapy  [address] [facility]  P: [phone]  F: [phone] [] [address]  P: [phone]  F: [phone] [] [address]  [facility]   [address]   [place]: [phone]  F: [phone]      Physical Therapy Daily Treatment Note    Date:  2021  Patient Name:  Fide Ferro    :  1970  MRN: [id number]  Physician: Nhi Dailey MD                                    Insurance: [employer] (30 vs/ year)  Medical Diagnosis: M54.2, G89.29 (ICD-10-CM) - Chronic neck pain; M77.12 (ICD-10-CM) - Left lateral epicondylitis                       Rehab Codes: M54.2, M79.601, M79.602, M62.81  Onset Date: 21                 Next 's appt: 10/12/21  Visit# / total visits: -     Cancels/No Shows: 0    Subjective:    Pain:  [x] Yes  [] No Location: neck, L UE  Pain Rating: (0-10 scale) not quantified/10  Pain altered Tx:  [x] No  [] Yes  Action:    Comments:  Pt denies an increase in pain following the last session. Pt states he went to the chiropractor last visit. Pt notes the left elbow pain continues to be present.     Objective:  Modalities:   Precautions:  Exercises: bolded completed 21 [employer] access code [email or web address]   Exercise Reps/ Time Weight/ Level Comments   UBE 6' lv2 Switching every minute    Manual 8'  MFR to cervical paraspinals and SO  Inferior glide with extension to lower cervical spine during neck extension    Standing:      Corner pec stretch 3x 30\" hold    Cervical extension isometrics 10x5\"  Towel roll behind head Head/neck rotations 10x     Chin tucks 10x     Shoulder press 10x 2# ea    Forward and lateral raises 10x ea 2# ea    Serratus wall slides 10x2   Lime  Increased resistance 10/4   Wall angels 10x2       Band biceps curls 2x10 lime    DBfront raise 15x lime Increased reps 9/30   DB lateral raise 15x lime Increased reps 9/30   Drive the bus 5O57 lime    Shoulder shrugs 2x10 ea 5# Focus on ecc. control         Sitting       Thoracic extension over chair x     Cervical rotations  10x ea      Neck flexion/extension  10x ea     Supine:      Neck rotations  10x ea     Deep neck flexion 10x 3-5\" hold    Chin tucks + head lifts 10x           sidelying      Open books 10x ea     sidebend  10x ea      Prone   On total gym   Shoulder extension 20x 1# Added weight 10/4   ROWS 2X10 5#    H. Abd 2x8 2# Added weight 10/4   Y's 2x8 A    W's  10x2    2# Added weight 10/4   RAIZA chin tucks 10x 5\"    prone cervical extension 2x10  Head and neck off the table    Quadruped cat/camels for scapular protraction and retraction 10x           Treatment Charges: Mins Units   []  Modalities- ice massage      [x]  Ther Exercise 42 2   [x]  Manual Therapy 8 1   []  Ther Activities     []  Aquatics     []  Vasocompression     []  Neuro     Total Treatment time 50 3       Assessment: [] Progressing toward goals. [] No change. [x] Other: Pt continues to have complaints of chronic pain, therefore pain numbers are not obtained to avoid focusing on pain. Pt with less cervical musculature tightness compared to the previous session. Noted decrease in inferior glide of the lower cervical and upper thoracic spine is observed and some inferior glides were implemented with movement. Will continue this next treatment. Progressed cervical spine and scapulothoracic strengthening and neuromuscular control. Good cervical retraction is noted in supine, however, greater difficulty is noted in prone.  Increased cueing for proper sequencing and movement patterns is provided. Implemented side bending against gravity to increase strength in the frontal plane. Progressed scapular strengthening with good execution, however, exercises were ceased when compensations were observed. Pt requires tactile cueing intermittently during postural exercises in standing to prevent cervical protraction in standing. Cueing is also provided to decrease cervical protraction during UE movements to shoulder height and overhead. Pt denies an increase an aggravation of symptoms at the end of the session. [x] Patient would continue to benefit from skilled physical therapy services in order to: address neck and BUE pain along with mobility issues in order to improve activity tolerance and QOL. Problems:    [x] ? Pain: neck and LUE pain   [x] ? ROM: decreased neck rotation and decreased flexibility in UEs  [x] ? Strength: decreased and painful  strength; decreased deep neck flexor endurance  [x] ? Function: UEFS: 32.5% impairment; NDI: 56% impairment         UEs Short term goals: MEET IN 8 VISITS- as of 8/30/21 Status   Pain: Pt will report less than or equal to 5-6/10 left elbow pain at worst throughout the day in order to improve tolerance to use of UEs for ADLs, housework, and lifting. Met    ROM: Pt will demonstrate a negative Finkelstein test on BUE in order to improve ability to grasp the UEs with minimal discomfort. Not met bilaterally,greater on the R   Strength: Pt will demonstrate ability to complete gripping tasks with less than or equal to 0-1/10 increase in left elbow pain in order to improve tolerance to ADLs. Met    Home Exercise Program: Pt will demonstrate independence with HEP. Met    Longer term goals: MEET IN 16 VISITS     Pain: Pt will report less than or equal to 3-4/10 left elbow pain at worst throughout the day in order to improve tolerance to use of UEs for ADLs, housework, and lifting.  Ongoing 10/11   ROM: Pt will be able to demonstrate full AROM of the UEs with 0/10 pain in the medial and lateral elbow in order to improve use during ADLs and improve activity tolerance. MET 10/11 except for finkelsteins   Strength: Pt will demonstrate greater than or equal to 5/5 BUE strength with 0/10 elbow pain with testing and an improvement in left  strength by 5 lbs to improve tolerance to lifting, grasping, and ADL completion. MET 10/11   Outcome Score: Pt will improve the UEFS score by greater than or equal to 10% in order to demonstrate an improvement in function. Ongoing 10/11      Neck STG: (to be met in 8 treatments)  1. ? Pain: Pt will report less than or equal to 6-7/10 neck pain at most throughout the day and a decrease in headaches by 25% in order to improve tolerance to ADL completion and improve QOL. MET 9/16  2. ? ROM: Pt will be able to demonstrate neck rotation to the R and left to greater than or equal to 60 degrees in order to assist scanning the environment and safe driving. MET 9/16 (62° B)  3. ? Strength: Pt will be able to complete the 10 head lifts off the table with initiating movement from deep neck flexors vs global neck flexors in order to demonstrate neuromuscular re-education and postural awareness/strenght. MET 9/16  4. ? Function: Pt will score less than or equal to 50% on the NDI in order to demonstrate improved function on ADLs. MET 9/16 (24% impairment)  5. Independent with Home Exercise Programs  LTG: (to be met in 16 treatments)  1. ? Pain: Pt will report less than or equal 4-5/10 neck pain at most throughout the day and a decrease in headaches by 50% in order to improve tolerance to ADL completion and improve QOL. Intermittently met 10/11  2. ? ROM: Pt will demonstrate cervical ROM to greater than or equal to 70 degrees rotation (B) and maintain for 10\" and flexion, extension, and SB without pain/reports of tightness in order to improve tolerance to driving and completing ADLs.  Ongoing 10/11 (looking to the left for short period of time continues to increase neck pain)  3. ? Strength: Pt will demonstrate ability to complete deep neck flexor endurance test for greater than or equal to 38\" without an increase in neck pain in order to demonstrate improved postural strength and control when completing ADLs. Ongoing 10/11  4. ? Function: Pt will score less than or equal to 45% on the NDI in order to demonstrate improved function with ADLs. MET 10/11                    Patient goals: to feel better, strong without pain       Pt. Education:  [x] Yes  [] No  [x] Reviewed Prior HEP/Ed. Method of Education: [x] Verbal- avoiding aggravators to elbow and repetitive use  [] Demo  [] Written  8/26/21: prone scapular retractions; prone extension, prone h. Abd, prone Ws  10/11: wall angels, serratus slides, push ups, front raises, lateral raises  Comprehension of Education:  [x] Verbalizes understanding. [] Demonstrates understanding. [x] Needs review  [] Demonstrates/verbalizes HEP/Ed previously given. Plan: [x] Continue current frequency toward long and short term goals.     [x] Specific Instructions for subsequent treatments: progress weight; PNF patterns      Time In: 3:03pm             Time Out: 401 pm     Electronically signed by: Alfonzo Singh PT

## 2021-11-18 ENCOUNTER — HOSPITAL ENCOUNTER (OUTPATIENT)
Dept: PHYSICAL THERAPY | Facility: CLINIC | Age: 51
Setting detail: THERAPIES SERIES
Discharge: HOME | End: 2021-11-18
Payer: MEDICARE

## 2021-11-18 PROCEDURE — 97110 THERAPEUTIC EXERCISES: CPT

## 2021-11-18 PROCEDURE — 97140 MANUAL THERAPY 1/> REGIONS: CPT

## 2021-11-18 NOTE — FLOWSHEET NOTE
[] [facility]) - [facility] &  Therapy  [address]  [phone]  F: [phone] [x] [address]  [facility] [address]   [address]  P: [phone]  F: [phone] [] [address] &  Therapy  [address]  P: [phone]  F: [phone] [] [address]  P: [phone]  F: [phone] [] [address]  [facility]   [address]   [place]: [phone]  F: [phone]      Physical Therapy Daily Treatment Note    Date:  2021  Patient Name:  Anoop Clay    :  1970  MRN: [id number]  Physician: Fara Islas MD                                    Insurance: [place] [employer] (30 vs/lucas year)  Medical Diagnosis: M54.2, G89.29 (ICD-10-CM) - Chronic neck pain; M77.12 (ICD-10-CM) - Left lateral epicondylitis                       Rehab Codes: M54.2, M79.601, M79.602, M62.81  Onset Date: 21                 Next Dr.'s appt: 10/12/21  Visit# / total visits: -     Cancels/No Shows: 0    Subjective:    Pain:  [x] Yes  [] No Location: neck, L UE  Pain Rating: (0-10 scale) not quantified/10  Pain altered Tx:  [x] No  [] Yes  Action:    Comments:  Pt without any new complaints this date.      Objective:  Modalities:   Precautions:  Exercises: bolded completed 21 ZarthCode access code [email or web address]   Exercise Reps/ Time Weight/ Level Comments   UBE 6' lv2.5 Switching every minute    Manual 5'  PA glides to the lower c-spine and upper t-spine; unilateral PA to upper t-spine as indicated  Oscillating and sustained   Standing:      Corner pec stretch 3x 30\" hold    Cervical extension isometrics 10x5\"  Towel roll behind head    Head/neck rotations 10x     Chin tucks 10x  Head against towel on wall   Shoulder press 10x 2# ea Head against towel on wall   Forward and lateral raises 10x ea 2# ea Head against towel on wall   Serratus wall slides 10x2   Lime  Increased resistance 10/4         Sitting       SB seated shoulder extension 2x10 plum    SB seated B ER 2x10 blue    Cervical rotations  10x ea      Neck flexion/extension  10x ea     Supine:      Neck rotations  10x ea     Deep neck flexion 10x 3-5\" hold    Chin tucks + head lifts 10x           sidelying      Open books 5x ea     sidebend  10x ea      Prone   On total gym   Shoulder extension 20x 1# Added weight 10/4   ROWS 2X10 5#    H. Abd 2x8 2# Added weight 10/4   Y's 2x8 A    W's  8x2    2# Added weight 10/4  Cues to decrease squeeze if painful   RAIZA chin tucks 10x 5\"    prone cervical extension 2x10  Head and neck off the table    Quadruped cat/camels for scapular protraction and retraction 10x  Difficulty with extension without flexing elbows         Treatment Charges: Mins Units   []  Modalities- ice massage      [x]  Ther Exercise 38 2   [x]  Manual Therapy 5 1   []  Ther Activities     []  Aquatics     []  Vasocompression     []  Neuro     Total Treatment time 43 3       Assessment: [] Progressing toward goals. [] No change. [x] Other: Assessment of the lower cervical and upper thoracic spine with notable hypermobility with PA glides. Manual therapy to promote cervicothoracic extension is completed with good response. Improvements in spinal mobility with prone cervical retraction and extension is observed. Pt was able to maintain this mobility throughout the session. Pt with continued reports of hypomobility of the spine when completing cat/camels. Greatest challenge is noted when moving into spinal extension with inability to maintain elbow extension. Cueing to correct head positions throughout movement is provided. Pt is able to complete all prescribed exercises with good technique and minimal cueing for correction.  Use of a towel roll behind the head during UE movements is utilized to prevent forward head positioning with UE elevation. Implemented use of a stability ball with postural strengthening tasks to work on the core strength. Pt with initial cueing to focus on erect trunk as pt is observed to slump on stability ball. Problems:    [x] ? Pain: neck and LUE pain   [x] ? ROM: decreased neck rotation and decreased flexibility in UEs  [x] ? Strength: decreased and painful  strength; decreased deep neck flexor endurance  [x] ? Function: UEFS: 32.5% impairment; NDI: 56% impairment         UEs Short term goals: MEET IN 8 VISITS- as of 8/30/21 Status   Pain: Pt will report less than or equal to 5-6/10 left elbow pain at worst throughout the day in order to improve tolerance to use of UEs for ADLs, housework, and lifting. Met    ROM: Pt will demonstrate a negative Finkelstein test on BUE in order to improve ability to grasp the UEs with minimal discomfort. Not met bilaterally,greater on the R   Strength: Pt will demonstrate ability to complete gripping tasks with less than or equal to 0-1/10 increase in left elbow pain in order to improve tolerance to ADLs. Met    Home Exercise Program: Pt will demonstrate independence with HEP. Met    Longer term goals: MEET IN 16 VISITS     Pain: Pt will report less than or equal to 3-4/10 left elbow pain at worst throughout the day in order to improve tolerance to use of UEs for ADLs, housework, and lifting. Ongoing 10/11   ROM: Pt will be able to demonstrate full AROM of the UEs with 0/10 pain in the medial and lateral elbow in order to improve use during ADLs and improve activity tolerance. MET 10/11 except for finkelsteins   Strength: Pt will demonstrate greater than or equal to 5/5 BUE strength with 0/10 elbow pain with testing and an improvement in left  strength by 5 lbs to improve tolerance to lifting, grasping, and ADL completion.   MET 10/11   Outcome Score: Pt will improve the UEFS score by greater than or equal to 10% in order to demonstrate an improvement in function. Ongoing 10/11      Neck STG: (to be met in 8 treatments)  1. ? Pain: Pt will report less than or equal to 6-7/10 neck pain at most throughout the day and a decrease in headaches by 25% in order to improve tolerance to ADL completion and improve QOL. MET 9/16  2. ? ROM: Pt will be able to demonstrate neck rotation to the R and left to greater than or equal to 60 degrees in order to assist scanning the environment and safe driving. MET 9/16 (62° B)  3. ? Strength: Pt will be able to complete the 10 head lifts off the table with initiating movement from deep neck flexors vs global neck flexors in order to demonstrate neuromuscular re-education and postural awareness/strenght. MET 9/16  4. ? Function: Pt will score less than or equal to 50% on the NDI in order to demonstrate improved function on ADLs. MET 9/16 (24% impairment)  5. Independent with Home Exercise Programs  LTG: (to be met in 16 treatments)  1. ? Pain: Pt will report less than or equal 4-5/10 neck pain at most throughout the day and a decrease in headaches by 50% in order to improve tolerance to ADL completion and improve QOL. Intermittently met 10/11  2. ? ROM: Pt will demonstrate cervical ROM to greater than or equal to 70 degrees rotation (B) and maintain for 10\" and flexion, extension, and SB without pain/reports of tightness in order to improve tolerance to driving and completing ADLs. Ongoing 10/11 (looking to the left for short period of time continues to increase neck pain)  3. ? Strength: Pt will demonstrate ability to complete deep neck flexor endurance test for greater than or equal to 38\" without an increase in neck pain in order to demonstrate improved postural strength and control when completing ADLs. Ongoing 10/11  4. ? Function: Pt will score less than or equal to 45% on the NDI in order to demonstrate improved function with ADLs.  MET 10/11                    Patient goals: to feel better, strong without pain       Pt. Education:  [x] Yes  [] No  [x] Reviewed Prior HEP/Ed. Method of Education: [x] Verbal- avoiding aggravators to elbow and repetitive use  [] Demo  [] Written  8/26/21: prone scapular retractions; prone extension, prone h. Abd, prone Ws  10/11: wall angels, serratus slides, push ups, front raises, lateral raises  Comprehension of Education:  [x] Verbalizes understanding. [] Demonstrates understanding. [x] Needs review  [] Demonstrates/verbalizes HEP/Ed previously given. Plan: [x] Continue current frequency toward long and short term goals.     [x] Specific Instructions for subsequent treatments: progress weight; PNF patterns      Time In: 3:03pm             Time Out: 400 pm     Electronically signed by: Joni Prajapati PT

## 2021-11-23 ENCOUNTER — OFFICE VISIT (OUTPATIENT)
Dept: PHYSICAL MEDICINE AND REHAB | Age: 51
End: 2021-11-23
Payer: MEDICARE

## 2021-11-23 ENCOUNTER — HOSPITAL ENCOUNTER (OUTPATIENT)
Dept: PHYSICAL THERAPY | Facility: CLINIC | Age: 51
Setting detail: THERAPIES SERIES
Discharge: HOME | End: 2021-11-23
Payer: MEDICARE

## 2021-11-23 VITALS
DIASTOLIC BLOOD PRESSURE: 72 MMHG | OXYGEN SATURATION: 98 % | SYSTOLIC BLOOD PRESSURE: 125 MMHG | TEMPERATURE: 98.2 F | HEART RATE: 80 BPM | RESPIRATION RATE: 17 BRPM | WEIGHT: 201.6 LBS | BODY MASS INDEX: 30.55 KG/M2 | HEIGHT: 68 IN

## 2021-11-23 DIAGNOSIS — M77.02 MEDIAL EPICONDYLITIS OF LEFT ELBOW: ICD-10-CM

## 2021-11-23 DIAGNOSIS — G89.29 CHRONIC NECK PAIN: ICD-10-CM

## 2021-11-23 DIAGNOSIS — M65.4 DE QUERVAIN'S TENOSYNOVITIS, RIGHT: Primary | ICD-10-CM

## 2021-11-23 DIAGNOSIS — M54.2 CHRONIC NECK PAIN: ICD-10-CM

## 2021-11-23 PROCEDURE — 3017F COLORECTAL CA SCREEN DOC REV: CPT | Performed by: STUDENT IN AN ORGANIZED HEALTH CARE EDUCATION/TRAINING PROGRAM

## 2021-11-23 PROCEDURE — G8417 CALC BMI ABV UP PARAM F/U: HCPCS | Performed by: STUDENT IN AN ORGANIZED HEALTH CARE EDUCATION/TRAINING PROGRAM

## 2021-11-23 PROCEDURE — 97140 MANUAL THERAPY 1/> REGIONS: CPT

## 2021-11-23 PROCEDURE — G8484 FLU IMMUNIZE NO ADMIN: HCPCS | Performed by: STUDENT IN AN ORGANIZED HEALTH CARE EDUCATION/TRAINING PROGRAM

## 2021-11-23 PROCEDURE — 1036F TOBACCO NON-USER: CPT | Performed by: STUDENT IN AN ORGANIZED HEALTH CARE EDUCATION/TRAINING PROGRAM

## 2021-11-23 PROCEDURE — 99214 OFFICE O/P EST MOD 30 MIN: CPT | Performed by: STUDENT IN AN ORGANIZED HEALTH CARE EDUCATION/TRAINING PROGRAM

## 2021-11-23 PROCEDURE — 97110 THERAPEUTIC EXERCISES: CPT

## 2021-11-23 PROCEDURE — G8427 DOCREV CUR MEDS BY ELIG CLIN: HCPCS | Performed by: STUDENT IN AN ORGANIZED HEALTH CARE EDUCATION/TRAINING PROGRAM

## 2021-11-23 NOTE — FLOWSHEET NOTE
[] [facility]) - [facility] &  Therapy  [address]  [phone]  F: [phone] [x] [address]  [facility] 36   [address]  P: [phone]  F: [phone] [] [address] &  Therapy  [address]  P: [phone]  F: [phone] [] [address]  P: [phone]  F: [phone] [] [address]  [facility]   [address]   [place]: [phone]  F: [phone]      Physical Therapy Daily Treatment Note    Date:  2021  Patient Name:  Eleanor Munoz    :  1970  MRN: [id number]  Physician: Esdras Mitchell MD                                    Insurance: [place] [employer] (30 vs/ year)  Medical Diagnosis: M54.2, G89.29 (ICD-10-CM) - Chronic neck pain; M77.12 (ICD-10-CM) - Left lateral epicondylitis                       Rehab Codes: M54.2, M79.601, M79.602, M62.81  Onset Date: 21                 Next 's appt: 10/12/21  Visit# / total visits: -     Cancels/No Shows: 0    Subjective:    Pain:  [x] Yes  [] No Location: neck, L UE  Pain Rating: (0-10 scale) not quantified/10  Pain altered Tx:  [x] No  [] Yes  Action:    Comments:  P notes his neck was not sore following the last session. Pt notes the wrist joint on the R side has been more painful recently with brushing teeth, pushing/pulling, and completing simple tasks.       Objective:  Modalities:   Precautions:  Exercises: bolded completed 21 Motility Count access code [email or web address]   Exercise Reps/ Time Weight/ Level Comments   UBE 6' lv2.5 Switching every minute    Manual 10'  PA glides to the lower c-spine and upper t-spine; unilateral PA to upper t-spine as indicated  Oscillating and sustained  Wrist mobilizations    Standing:      Corner pec stretch 3x 30\" hold Cervical extension isometrics 10x5\"  Towel roll behind head    Head/neck rotations 10x     Chin tucks 10x  Head against towel on wall   Shoulder press 14X  6X 2# ea Head against towel on wall   Forward and lateral raises 10x ea 2# ea Head against towel on wall   Serratus wall slides 10x2   blue Increased resistance 10/4   Wall push ups 2x10  Hands on bars   Body blade arms at side 2x20\"     bodyblade arms out front 3x30\"     Sitting       SB seated shoulder extension 2x10 plum    SB seated B ER 2x10 blue    Cervical rotations  10x ea      Neck flexion/extension  10x ea     Supine:      Wrist extension-- eccentric  2x10     Neck rotations  10x ea     Deep neck flexion 10x 3-5\" hold    Chin tucks + head lifts 10x           sidelying      Open books 10x ea     sidebend  10x ea      Prone   On total gym   Shoulder extension 20x 1# Added weight 10/4   ROWS 2X10 5#    H. Abd 2x8 2# Added weight 10/4   Y's 2x8 A    W's  8X2 2# Added weight 10/4  Cues to decrease squeeze if painful   RAIZA chin tucks 10x 5\"    prone cervical extension 2x10  Head and neck off the table    Quadruped cat/camels for scapular protraction and retraction 10x  Fists to avoid extension of wrists   AMANDA rows 8-10 reps  2 sets 4 pl  3pl    AMANDA lat pulldown 2x10 2 pl                Treatment Charges: Mins Units   []  Modalities- ice massage      [x]  Ther Exercise 30 2   [x]  Manual Therapy 10 1   []  Ther Activities     []  Aquatics     []  Vasocompression     []  Neuro     Total Treatment time 40 3       Assessment: [x] Progressing toward goals. [] No change. [x] Other: pt arrives with complaints of pain in the R wrist joint during pushing, pulling, and repetitive fine motor tasks.  Minimal hypomobility of the wrist joint is noted, however, manual stretching, distraction, and volar glides completed to the radiocarpal and ulnocarpal were completed for pain management. modified exercises to keep wrist in neutral during quadruped activities and wall push ups. Pt demonstrates continued hypomobility of the upper thoracic spine with PA glides completed. Good recall and execution of prone cervical retractions and extesnion. Cueing for upright posturing during stability exerciss was proivded to prevent slump posturing. Progressed pt to body blade activities for increased stabilization and activity tolerance to the R wrist. Pt was able to progress to weighted equipment with good tolerance. Light weight completed for successful completion. Problems:    [x] ? Pain: neck and LUE pain   [x] ? ROM: decreased neck rotation and decreased flexibility in UEs  [x] ? Strength: decreased and painful  strength; decreased deep neck flexor endurance  [x] ? Function: UEFS: 32.5% impairment; NDI: 56% impairment         UEs Short term goals: MEET IN 8 VISITS- as of 8/30/21 Status   Pain: Pt will report less than or equal to 5-6/10 left elbow pain at worst throughout the day in order to improve tolerance to use of UEs for ADLs, housework, and lifting. Met    ROM: Pt will demonstrate a negative Finkelstein test on BUE in order to improve ability to grasp the UEs with minimal discomfort. Not met bilaterally,greater on the R   Strength: Pt will demonstrate ability to complete gripping tasks with less than or equal to 0-1/10 increase in left elbow pain in order to improve tolerance to ADLs. Met    Home Exercise Program: Pt will demonstrate independence with HEP. Met    Longer term goals: MEET IN 16 VISITS     Pain: Pt will report less than or equal to 3-4/10 left elbow pain at worst throughout the day in order to improve tolerance to use of UEs for ADLs, housework, and lifting. Ongoing 10/11   ROM: Pt will be able to demonstrate full AROM of the UEs with 0/10 pain in the medial and lateral elbow in order to improve use during ADLs and improve activity tolerance.   MET 10/11 except for finkelsteins   Strength: Pt will demonstrate greater than or equal to 5/5 BUE strength with 0/10 elbow pain with testing and an improvement in left  strength by 5 lbs to improve tolerance to lifting, grasping, and ADL completion. MET 10/11   Outcome Score: Pt will improve the UEFS score by greater than or equal to 10% in order to demonstrate an improvement in function. Ongoing 10/11      Neck STG: (to be met in 8 treatments)  1. ? Pain: Pt will report less than or equal to 6-7/10 neck pain at most throughout the day and a decrease in headaches by 25% in order to improve tolerance to ADL completion and improve QOL. MET 9/16  2. ? ROM: Pt will be able to demonstrate neck rotation to the R and left to greater than or equal to 60 degrees in order to assist scanning the environment and safe driving. MET 9/16 (62° B)  3. ? Strength: Pt will be able to complete the 10 head lifts off the table with initiating movement from deep neck flexors vs global neck flexors in order to demonstrate neuromuscular re-education and postural awareness/strenght. MET 9/16  4. ? Function: Pt will score less than or equal to 50% on the NDI in order to demonstrate improved function on ADLs. MET 9/16 (24% impairment)  5. Independent with Home Exercise Programs  LTG: (to be met in 16 treatments)  1. ? Pain: Pt will report less than or equal 4-5/10 neck pain at most throughout the day and a decrease in headaches by 50% in order to improve tolerance to ADL completion and improve QOL. Intermittently met 10/11  2. ? ROM: Pt will demonstrate cervical ROM to greater than or equal to 70 degrees rotation (B) and maintain for 10\" and flexion, extension, and SB without pain/reports of tightness in order to improve tolerance to driving and completing ADLs.  Ongoing 10/11 (looking to the left for short period of time continues to increase neck pain)  3. ? Strength: Pt will demonstrate ability to complete deep neck flexor endurance test for greater than or equal to 38\" without an increase in neck pain in order to demonstrate improved postural strength and control when completing ADLs. Ongoing 10/11  4. ? Function: Pt will score less than or equal to 45% on the NDI in order to demonstrate improved function with ADLs. MET 10/11                    Patient goals: to feel better, strong without pain       Pt. Education:  [x] Yes  [] No  [x] Reviewed Prior HEP/Ed. Method of Education: [x] Verbal- avoiding aggravators to elbow and repetitive use  [] Demo  [] Written  8/26/21: prone scapular retractions; prone extension, prone h. Abd, prone Ws  10/11: wall angels, serratus slides, push ups, front raises, lateral raises  Comprehension of Education:  [x] Verbalizes understanding. [] Demonstrates understanding. [x] Needs review  [] Demonstrates/verbalizes HEP/Ed previously given. Plan: [x] Continue current frequency toward long and short term goals.     [x] Specific Instructions for subsequent treatments: progress weight; PNF patterns      Time In: 3:00pm             Time Out: 358pm     Electronically signed by: Elliott Cowart PT

## 2021-11-23 NOTE — PROGRESS NOTES
P PHYSICIANS  [facility] PHYSICAL MEDICINE AND REHABILITATION  [address] [address]  Dept: [phone]  Dept Fax: [phone]    Outpatient Followup Note    Khari Malloy is a 46y.o.-year-old male presenting for follow up of neck pain, left elbow pain, and right wrist pain. HPI:     He was last seen on 10/12/21 for follow up of neck pain and left elbow pain. Since that time, he reports improvement in neck pain with continued physical therapy. He notes that left elbow pain is still present and can fluctuate in intensity. He localizes the pain to the medial epicondyle and states that it increases with making a fist with the left hand. He denies any numbness/tingling and weakness in the bilateral upper limbs. He also notes continued pain in the right lateral wrist.  He had another injection for de Quervain's tenosynovitis with Dr. Harvey Berger about 3-4 weeks ago. He states that the two injections have helped bring the pain from 8-9 out of 10 to about 2-3 out of 10. However, he states that he has only been working on completing ADLs with the right hand and has not been able to do anything more strenuous without increased pain. He feels like he has not been able to complete tasks that he needs to do at home (like yardwork). He reports that he has been wearing the right wrist brace almost all of the time and has been doing some exercises in PT for the right wrist as well. He states that the right wrist pain is bothering him the most at this time, as he is right-handed. He continues to take celebrex, amitriptyline, and as-needed flexeril for pain.       Past Medical History:   Diagnosis Date    ALT (SGPT) level raised 4/6/2015    Bilateral thoracic back pain 9/27/2015    Bronchitis 12/13/2015    Contusion of right knee 3/26/2018    DDD (degenerative disc disease), cervical 4/6/2015    Dyslipidemia 4/6/2015    Dyspepsia 4/6/2015    Fractured toe 06/2020    Herniated cervical disc 2015    Herpes zoster without complication     Hypoglycemia 2016    Hypogonadism in male     Immunization counseling 2017    Insomnia 2015    Leukocytopenia, unspecified 2016    Leukopenia 2016    Myofascial muscle pain 2015    Need for shingles vaccine 2018    Pain in abdominal muscle of right flank 2016    Pharyngoesophageal dysphagia 2017    Post laminectomy syndrome 10/17/2016    Prostate cancer screening 2016    Rhinosinusitis 2015      Past Surgical History:   Procedure Laterality Date    CARDIAC CATHETERIZATION  2006    COLONOSCOPY      ENDOSCOPY, COLON, DIAGNOSTIC  2008    HERNIA REPAIR Bilateral 2005    LUMBAR LAMINECTOMY  2013    by Dr. Nova Mclaughlin N/A 2017    EGD DILATION SAVORY performed by Alondra Polanco MD at [address] [place] [address] History   Family history unknown: Yes     Social History     Socioeconomic History    Marital status:      Spouse name: None    Number of children: None    Years of education: None    Highest education level: None   Occupational History    None   Tobacco Use    Smoking status: Former Smoker     Quit date: 1997     Years since quittin.9    Smokeless tobacco: Never Used   Vaping Use    Vaping Use: Never used   Substance and Sexual Activity    Alcohol use: No    Drug use: No    Sexual activity: Yes     Partners: Female   Other Topics Concern    None   Social History Narrative    None     Social Determinants of Health     Financial Resource Strain:     Difficulty of Paying Living Expenses: Not on file   Food Insecurity:     Worried About Running Out of Food in the Last Year: Not on file    Brooke of Food in the Last Year: Not on file   Transportation Needs:     Lack of Transportation (Medical): Not on file    Lack of Transportation (Non-Medical):  Not on file   Physical Activity:     Days of Exercise per Week: Not on file    Minutes of Exercise per Session: Not on file   Stress:     Feeling of Stress : Not on file   Social Connections:     Frequency of Communication with Friends and Family: Not on file    Frequency of Social Gatherings with Friends and Family: Not on file    Attends Muslim Services: Not on file    Active Member of [address] [place] [address] or Organizations: Not on file    Attends Club or Organization Meetings: Not on file    Marital Status: Not on file   Intimate Partner Violence:     Fear of Current or Ex-Partner: Not on file    Emotionally Abused: Not on file    Physically Abused: Not on file    Sexually Abused: Not on file   Housing Stability:     Unable to Pay for Housing in the Last Year: Not on file    Number of [place] in the Last Year: Not on file    Unstable Housing in the Last Year: Not on file       No Known Allergies    Current Outpatient Medications   Medication Sig Dispense Refill    amitriptyline (ELAVIL) 75 MG tablet Take 1 tablet by mouth 2 times daily 60 tablet 3    celecoxib (CELEBREX) 50 MG capsule Take 1 capsule by mouth daily TAKE ONE CAPSULE BY MOUTH DAILY 60 capsule 3    Cholecalciferol (VITAMIN D3) 125 MCG (5000 UT) CAPS Take 1 capsule by mouth Daily 30 capsule 3    omeprazole (PRILOSEC) 40 MG delayed release capsule TAKE ONE CAPSULE BY MOUTH DAILY 30 capsule 3    fluticasone (FLONASE) 50 MCG/ACT nasal spray 1 spray by Nasal route daily 1 each 3    cyclobenzaprine (FLEXERIL) 5 MG tablet Take 1 tablet by mouth 3 times daily      RESTASIS 0.05 % ophthalmic emulsion       Omega-3 Fatty Acids (FISH OIL) 1000 MG CAPS Take 3,000 mg by mouth 3 times daily      Glucosamine-Chondroit-Vit C-Mn (GLUCOSAMINE 1500 COMPLEX PO) Take by mouth      Multiple Vitamins-Minerals (THERAPEUTIC MULTIVITAMIN-MINERALS) tablet Take 1 tablet by mouth daily      diclofenac sodium (VOLTAREN) 1 % GEL Apply topically 2 times daily (Patient not taking: Reported on 10/12/2021) 150 g 1     No current facility-administered medications for this visit. Subjective:      Review of Systems   Constitutional: Positive for activity change. Musculoskeletal: Positive for arthralgias and neck pain. Neurological: Negative for weakness and numbness. Objective:     Physical Exam  /72 (Site: Right Upper Arm, Position: Sitting, Cuff Size: Large Adult)   Pulse 80   Temp 98.2 °F (36.8 °C) (Temporal)   Resp 17   Ht 5' 8\" (1.727 m)   Wt 201 lb 9.6 oz (91.4 kg)   SpO2 98%   BMI 30.65 kg/m²     Constitutional: He appears well-developed and well-nourished. In no distress. HEENT: NCAT, EOM grossly intact. Hearing grossly intact. Pulmonary/Chest: Respirations WNL and unlabored. MSK:  Tenderness to palpation over the right lateral wrist and right dorsal wrist.  Normal ROM of the right wrist and fingers. Pain present with resisted right thumb extension. No pain with resisted right wrist flexion, wrist extension, and finger extension. Tenderness to palpation at the left medial epicondyle. Normal ROM of the left elbow, wrist, and fingers. Pain with left wrist and finger flexion. Strength 5/5 throughout bilateral upper limbs. Normal cervical spine ROM; some pain with bilateral rotation and lateral bending. Neurological: He is alert and oriented to person, place, and time. Sensation to light touch intact in bilateral upper limbs. Negative Tinel sign at the left medial elbow. Skin: Skin is warm dry and intact with good turgor. Psychiatric: He has a normal mood and affect. His behavior is normal. Thought content normal.    Nursing note and vitals reviewed. Reviewed notes from sports medicine, physical therapy. Imaging  Cervical spine x-rays, 10/13/21:  Impression   Multilevel degenerative facet hypertrophy without acute abnormality.  No   pathologic motion or instability. Assessment:       Diagnosis Orders   1. De Quervain's tenosynovitis, right     2.  Medial epicondylitis of left elbow     3. Chronic neck pain          Plan:       - Reviewed cervical spine x-rays with patient  - Provided prescription for biomed topical compounded cream to see if this helps with right wrist and/or left elbow pain  - Continue use of right wrist brace  - Also discussed follow up with Dr. Jared Castro, as he has performed two injections for de Quervain's tenosynovitis over the last several months  - Continue outpatient physical therapy  - Discussed possible referral to orthopedic surgery for evaluation/discussion of any surgical intervention for de Quervain's tenosynovitis, as pain has been persistent even though it has improved somewhat and has been affecting patient's normal activities - he states that he is not interested in surgery and would like to hold off on the referral at this time      Return in about 6 weeks (around 1/4/2022).        Electronically signed by Zonia Greene MD on 11/24/2021 at 12:11 AM.

## 2021-12-02 ENCOUNTER — APPOINTMENT (OUTPATIENT)
Dept: PHYSICAL THERAPY | Facility: CLINIC | Age: 51
End: 2021-12-02
Payer: MEDICARE

## 2021-12-06 ENCOUNTER — OFFICE VISIT (OUTPATIENT)
Dept: ORTHOPEDIC SURGERY | Age: 51
End: 2021-12-06
Payer: MEDICARE

## 2021-12-06 VITALS — DIASTOLIC BLOOD PRESSURE: 87 MMHG | SYSTOLIC BLOOD PRESSURE: 129 MMHG | HEART RATE: 92 BPM

## 2021-12-06 DIAGNOSIS — M65.4 DE QUERVAIN'S TENOSYNOVITIS, RIGHT: Primary | ICD-10-CM

## 2021-12-06 PROCEDURE — G8417 CALC BMI ABV UP PARAM F/U: HCPCS | Performed by: FAMILY MEDICINE

## 2021-12-06 PROCEDURE — 99213 OFFICE O/P EST LOW 20 MIN: CPT | Performed by: FAMILY MEDICINE

## 2021-12-06 PROCEDURE — G8427 DOCREV CUR MEDS BY ELIG CLIN: HCPCS | Performed by: FAMILY MEDICINE

## 2021-12-06 PROCEDURE — 3017F COLORECTAL CA SCREEN DOC REV: CPT | Performed by: FAMILY MEDICINE

## 2021-12-06 PROCEDURE — 1036F TOBACCO NON-USER: CPT | Performed by: FAMILY MEDICINE

## 2021-12-06 PROCEDURE — G8484 FLU IMMUNIZE NO ADMIN: HCPCS | Performed by: FAMILY MEDICINE

## 2021-12-06 RX ORDER — CREAM BASE NO.135
CREAM (GRAM) MISCELLANEOUS
COMMUNITY
Start: 2021-11-30 | End: 2022-09-08

## 2021-12-06 NOTE — PROGRESS NOTES
Sports Medicine Consultation    CHIEF COMPLAINT:  Wrist Pain (Right wrist f/u. worse over last 3wks. no new injury)      HPI:  The patient is a 46 y.o. male who is being seen for   established patient being seen for regarding follow up of a pre-existing problem of  r wrist pain. The patient is a right hand dominant male who has had right hand/wrist pain for 3 weeks. As far as trauma to the hand the patient indicates no new injury. The following medications/interventions have been tried: 2 injections and pt without benefit. he has a past medical history of ALT (SGPT) level raised, Bilateral thoracic back pain, Bronchitis, Contusion of right knee, DDD (degenerative disc disease), cervical, Dyslipidemia, Dyspepsia, Fractured toe, Herniated cervical disc, Herpes zoster without complication, Hypoglycemia, Hypogonadism in male, Immunization counseling, Insomnia, Leukocytopenia, unspecified, Leukopenia, Myofascial muscle pain, Need for shingles vaccine, Pain in abdominal muscle of right flank, Pharyngoesophageal dysphagia, Post laminectomy syndrome, Prostate cancer screening, and Rhinosinusitis. he has a past surgical history that includes lumbar laminectomy (); hernia repair (Bilateral, ); Colonoscopy; Endoscopy, colon, diagnostic (); Upper gastrointestinal endoscopy (N/A, 2017); and Cardiac catheterization (). Family history is unknown by patient.     Social History     Socioeconomic History    Marital status:      Spouse name: Not on file    Number of children: Not on file    Years of education: Not on file    Highest education level: Not on file   Occupational History    Not on file   Tobacco Use    Smoking status: Former Smoker     Quit date: 1997     Years since quittin.9    Smokeless tobacco: Never Used   Vaping Use    Vaping Use: Never used   Substance and Sexual Activity    Alcohol use: No    Drug use: No    Sexual activity: Yes     Partners: route daily 1 each 3    cyclobenzaprine (FLEXERIL) 5 MG tablet Take 1 tablet by mouth 3 times daily      RESTASIS 0.05 % ophthalmic emulsion       Omega-3 Fatty Acids (FISH OIL) 1000 MG CAPS Take 3,000 mg by mouth 3 times daily      Glucosamine-Chondroit-Vit C-Mn (GLUCOSAMINE 1500 COMPLEX PO) Take by mouth      Multiple Vitamins-Minerals (THERAPEUTIC MULTIVITAMIN-MINERALS) tablet Take 1 tablet by mouth daily      diclofenac sodium (VOLTAREN) 1 % GEL Apply topically 2 times daily (Patient not taking: Reported on 10/12/2021) 150 g 1     No current facility-administered medications for this visit. Allergies:  hehas No Known Allergies. ROS:  CV:  Denies chest pain; palpitations; shortness of breath; swelling of feet, ankles; and loss of consciousness. CON: Denies fever and dizziness. ENT:  Denies hearing loss / ringing, ear infections hoarseness, and swallowing problems. RESP:  Denies chronic cough, spitting up blood, and asthma/wheezing. GI: Denies abdominal pain, change in bowel habits, nausea or vomiting, and blood in stools. :  Denies frequent urination, burning or painful urination, blood in the urine, and bladder incontinence. NEURO:  Denies headache, memory loss, sleep disturbance, and tremor or movement disorder. PHYSICAL EXAM:    SKIN:  Intact without rashes, lesions or ulcerations. No obvious deformity or swelling. EYES:  Extraocular muscles intact. MOUTH: Oral mucosa moist.  No perioral lesions. PULM:  Respirations unlabored and regular. VASC:  Capillary refill less than 2 seconds. Hand/Wrist Exam  ROM:  Full flexor and extensor tendon function intact   Finger, hand, and wrist range of motion are WNL  Inspection-Deformity: no  Palpation-Tenderness: 1st dorsal compartment  There is is not thenar and is not interosseous atrophy. Strength- WNL  NEURO: Radial, ulnar, and median nerves are intact to motor and sensory testing. Two point discrimination is less than six mm. There is not decreased sensation to light touch and pinprick in the median and ulnar nerve distribution. CTS: Tinel's test at the wrist is negative. Flexion compression test negative  Phalen's test is negative. Finkelstein's test is positive. Elbow:  Range of motion and strength about the elbow is intact. Tinel's test is negative at the elbow. Cerv:  Full pain free range of motion with a negative Spurling's test.    RADIOLOGY: No results found. IMPRESSION:     1. De Quervain's tenosynovitis, right        PLAN:   We discussed some of the etiologies and natural histories of     ICD-10-CM    1. De Quervain's tenosynovitis, right  M65.4 MRI WRIST RIGHT WO CONTRAST     We discussed the various treatment alternatives including anti-inflammatory medications, physical therapy, injections, further imaging studies and as a last resort surgery. At this point patient is failing to improve I do think that an MRI is appropriate and an MRI was ordered in the office. He will follow-up with us based on the results of the MRI. He voiced understanding agreement this plan. No follow-ups on file. Please be aware portions of this note were completed using voice recognition software and unforeseen errors may have occurred    Electronically signed by Jorge Garcia DO, FAOASM  on 12/6/21 at 1:59 PM EST      No orders of the defined types were placed in this encounter.

## 2021-12-07 ENCOUNTER — HOSPITAL ENCOUNTER (OUTPATIENT)
Dept: PHYSICAL THERAPY | Facility: CLINIC | Age: 51
Setting detail: THERAPIES SERIES
Discharge: HOME | End: 2021-12-07
Payer: MEDICARE

## 2021-12-07 PROCEDURE — 97110 THERAPEUTIC EXERCISES: CPT

## 2021-12-07 NOTE — FLOWSHEET NOTE
[] [facility]) - [facility] &  Therapy  [address]  [phone]  F: [phone] [x] [email or web address]  [facility] [place] [facility]   [address]  P: [phone]  F: [phone] [] [address] &  Therapy  [address]  P: [phone]  F: [phone] [] [address]  P: [phone]  F: [phone] [] [address]  [facility]   [address]   [place]: [phone]  F: [phone]      Physical Therapy Daily Treatment Note    Date:  2021  Patient Name:  Minda Yap    :  1970  MRN: [id number]  Physician: Zonia Greene MD                                    Insurance: [employer] (30 vs/ year)  Medical Diagnosis: M54.2, G89.29 (ICD-10-CM) - Chronic neck pain; M77.12 (ICD-10-CM) - Left lateral epicondylitis                       Rehab Codes: M54.2, M79.601, M79.602, M62.81  Onset Date: 21                 Next 's appt: 10/12/21  Visit# / total visits: -     Cancels/No Shows: 0    Subjective:    Pain:  [x] Yes  [] No Location: neck, L UE  Pain Rating: (0-10 scale) not quantified/10  Pain altered Tx:  [x] No  [] Yes  Action:     Comments:  Pt reports his neck isnt hurting today. Pt states the only thing that is bothering him today is his R wrist. Pt states he saw Dr. Jared Castro yesterday and he ordered him an MRI for his wrist. Pt notes he wants to try everything for the wrist besides surgery.       Objective:  Modalities:   Precautions:  Exercises: bolded completed 21 [employer] access code [email or web address]   Exercise Reps/ Time Weight/ Level Comments   UBE 6' lv2.5 Switching every minute    Manual x  PA glides to the lower c-spine and upper t-spine; unilateral PA to upper t-spine as indicated      Standing:      Corner pec stretch 3x 30\" hold    scaption  2x8 lime    Shoulder press 2x10 3# Head against towel on wall   Forward and lateral raises 10x ea 3# ea    Serratus wall slides 20x blue Increased resistance 10/4   Wall clocks 5x ea     Wall push ups 2x10  Hands on bars   Body blade arms at side 2x20\"     bodyblade arms out front 3x30\"     Sitting       SB seated shoulder extension 2x10 plum    SB seated B ER 2x10 blue    SB seated bicep curls 2x10 5#          sidelying      Open books 10x ea     sidebend  10x ea      Prone on SB   Reviewed for HEP   Shoulder extension x     ROWS x     H. Abd x     Y's x     W's  8X2 2# Added weight 10/4  Cues to decrease squeeze if painful   RAIZA chin tucks 10x 5\"    prone cervical extension 2x10  Head and neck off the table    Quadruped cat/camels for scapular protraction and retraction 10x  Fists to avoid extension of wrists   AMANDA rows 2x10 3 pl    AMANDA lat pulldown 2x10 2 pl    AMANDA triceps press 20x 2 pl Increase next session         Treatment Charges: Mins Units   []  Modalities- ice massage      [x]  Ther Exercise 40 2   [x]  Manual Therapy 5 0   []  Ther Activities     []  Aquatics     []  Vasocompression     []  Neuro     Total Treatment time 45 3       Assessment: [x] Progressing toward goals. [] No change. [x] Other: Pt arrives with minimal to no complaints of neck pain, however, continues to have complaints of R wrist pain. Treatment focused on scapulothoracic and cervical strengthening with good execution. Pt with ability to progress scapular and shoulder strengthening with greatest difficulty with scapular clocks and lateral shoulder raises. Pt requires intermittent cueing to correct technique and therapist instructed cessation of exercise when technique failed. Pt admitted to not completed HEP at home, therefore, activities for pt to complete at home were provided. Encouraged pt to continue focusing michael UE and scapular strengthening to assist with overall function.  Reviewed lifting, grasping, and ADL completion. MET 10/11   Outcome Score: Pt will improve the UEFS score by greater than or equal to 10% in order to demonstrate an improvement in function. Ongoing 10/11      Neck STG: (to be met in 8 treatments)  1. ? Pain: Pt will report less than or equal to 6-7/10 neck pain at most throughout the day and a decrease in headaches by 25% in order to improve tolerance to ADL completion and improve QOL. MET 9/16  2. ? ROM: Pt will be able to demonstrate neck rotation to the R and left to greater than or equal to 60 degrees in order to assist scanning the environment and safe driving. MET 9/16 (62° B)  3. ? Strength: Pt will be able to complete the 10 head lifts off the table with initiating movement from deep neck flexors vs global neck flexors in order to demonstrate neuromuscular re-education and postural awareness/strenght. MET 9/16  4. ? Function: Pt will score less than or equal to 50% on the NDI in order to demonstrate improved function on ADLs. MET 9/16 (24% impairment)  5. Independent with Home Exercise Programs  LTG: (to be met in 16 treatments)  1. ? Pain: Pt will report less than or equal 4-5/10 neck pain at most throughout the day and a decrease in headaches by 50% in order to improve tolerance to ADL completion and improve QOL. Intermittently met 10/11  2. ? ROM: Pt will demonstrate cervical ROM to greater than or equal to 70 degrees rotation (B) and maintain for 10\" and flexion, extension, and SB without pain/reports of tightness in order to improve tolerance to driving and completing ADLs. Ongoing 10/11 (looking to the left for short period of time continues to increase neck pain)  3. ? Strength: Pt will demonstrate ability to complete deep neck flexor endurance test for greater than or equal to 38\" without an increase in neck pain in order to demonstrate improved postural strength and control when completing ADLs.  Ongoing 10/11  4. ? Function: Pt will score less than or equal to 45% on the NDI in order to demonstrate improved function with ADLs. MET 10/11                    Patient goals: to feel better, strong without pain       Pt. Education:  [x] Yes  [] No  [x] Reviewed Prior HEP/Ed. Method of Education: [x] Verbal-   [x] Demo  [x] Written  8/26/21: prone scapular retractions; prone extension, prone h. Abd, prone Ws  10/11: wall angels, serratus slides, push ups, front raises, lateral raises  12/7: access code: [email or web address]  Comprehension of Education:  [x] Verbalizes understanding. [] Demonstrates understanding. [x] Needs review  [] Demonstrates/verbalizes HEP/Ed previously given. Plan: [x] Continue current frequency toward long and short term goals.     [x] Specific Instructions for subsequent treatments: progress weight; PNF patterns      Time In: 3:00pm             Time Out: 358pm     Electronically signed by: Ria Prado PT

## 2021-12-10 DIAGNOSIS — M65.4 DE QUERVAIN'S TENOSYNOVITIS, RIGHT: Primary | ICD-10-CM

## 2021-12-14 ENCOUNTER — HOSPITAL ENCOUNTER (OUTPATIENT)
Dept: PHYSICAL THERAPY | Facility: CLINIC | Age: 51
Setting detail: THERAPIES SERIES
Discharge: HOME | End: 2021-12-14
Payer: MEDICARE

## 2021-12-14 PROCEDURE — 97161 PT EVAL LOW COMPLEX 20 MIN: CPT

## 2021-12-14 PROCEDURE — 97140 MANUAL THERAPY 1/> REGIONS: CPT

## 2021-12-14 PROCEDURE — 97110 THERAPEUTIC EXERCISES: CPT

## 2021-12-14 NOTE — CONSULTS
[] [facility]) - [facility] &  Therapy  [address]  [phone]  F: [phone] [x] [address]  [facility] 36   [address]  P: [phone]  F: [phone] [] [address] &  Therapy  [address]  P: [phone]  F: [phone] [] [address]  P: [phone]  F: [phone] [] [address]  [facility]   [address]   [place]: [phone]  F: [phone]        Physical Therapy Upper Extremity Evaluation    Date:  2021  Patient: Nicky Albert  : 1970  MRN: [id number]  Physician: Dr. Loulou Urena: Paradis Adv  Medical Diagnosis: M65.4 (ICD-10-CM) - Isadore Exon tenosynovitis, right  Rehab Codes: Q89.536, M25.641, M79.641, M79.644, M62.81  Onset Date: 12/10/21   Next 's appt: TBD    Subjective:   CC: R wrist pain limiting gripping and use of R hand   HPI: (onset date): Pt is a 46 y.o. male with c/o R wrist and thumb pain that has been present for some time. Pt has had 2 steroid injections in the wrist which helped initially, however, the effects did wear off. Pt continues to be compliant with the use of his wrist brace. Pt states he has an MRI scheduled and depending on the results will be referred to a surgeon. Pt has not trialed formal PT for this issue yet. Pt notes his pain significantly worsened on the Saturday prior to this evaluation when he was trying to put his wallet in his sock. Pt notes since then the pain has been constant. Pt notes at rest his pain is a 3-4/10 and with movement a 9/10. Pt states axial loading causes some pain but the most pain is placing his thumb towards his palm or ulnar deviation.  Pt notes he has some pain in the distal forearm and it feels like \"the bone is broken and pressing on a nerve. \" Pt states his pain is increased with repetitive motion such as brushing teeth, shaving his face, or showering. Pt notes placing pressure through the hand is very painful including pushing himself up from a chair. Grasping objects, I.e. holding a spoon, is very difficult. Pt notes he continues to take his muscle relaxers daily and has used some pain creams without help. Pt notes he did ice the other day which did seem to help mildly with the pain. Pt notes due to the pain he has been using his LUE for all activities (I.e. turning the car key).       Comorbidities:   Past Medical History:   Diagnosis Date    ALT (SGPT) level raised 4/6/2015    Bilateral thoracic back pain 9/27/2015    Bronchitis 12/13/2015    Contusion of right knee 3/26/2018    DDD (degenerative disc disease), cervical 4/6/2015    Dyslipidemia 4/6/2015    Dyspepsia 4/6/2015    Fractured toe 06/2020    Herniated cervical disc 4/6/2015    Herpes zoster without complication 6/88/0801    Hypoglycemia 6/1/2016    Hypogonadism in male     Immunization counseling 9/26/2017    Insomnia 9/27/2015    Leukocytopenia, unspecified 6/1/2016    Leukopenia 6/17/2016    Myofascial muscle pain 4/6/2015    Need for shingles vaccine 6/5/2018    Pain in abdominal muscle of right flank 4/13/2016    Pharyngoesophageal dysphagia 6/12/2017    Post laminectomy syndrome 10/17/2016    Prostate cancer screening 5/16/2016    Rhinosinusitis 4/6/2015        Yes  No Comments   Fatigue [] [x]    Fever/chills/sweats [] [x]    Malaise  [] [x]    Mental status change [] [x]    Paresthesias/numbness/weakness [] [x]    Unexplained weight changes [] [x]    Lightheaded/dizziness [] [x]    Shortness of breath [] [x]      Objective:  OBSERVATION No Deficit Deficit Not Tested Comments   INSPECTION/PALPATION    TTP:   Radial styloid process  Tendons over the thumb (snuff box)  Soft tissue proximal to radial styloid process     Less pain with overpressure to tendons with thumb extension  Neg piano key  Some pain with dorsal and palmar joint mobilizations    Able to complete finger to thumb- no pain    *= pain Strength ROM TESTS (+/-) Left Right Not Tested   AROM Left Right Left Right Finkelstein's  + []   Elbow Flex. (0-150) 5 5 WFL WFL       Elbow Ext. (5/10-0) 5 5 WFL WFL       Pronation (0-90) 5 4-*  90       Supination (0-90) 5 4+ no pain  80       Wrist Flex. (0-80) 5 5  A: 50  P: 75       Wrist Ext. (0-70) 5 4+  A: 30*  P: 55*       Rad. Dev. (0-20)  4-*  A: 10*  P: 40       Ulnar Dev. (0-30)    A: 10*  P: 12*        76 35* 76 35*       Pinch (index and thumb) 19 8  Pain on release         Thumb ROM (restin deg ext)  Flexion: 2 degrees (limite by pain)  Extension: 22 deg from resting  Adduction: no pain  Abduction: painful  MMT:   Thumb extension: 4-/5*  Flexion: 5/5  Abduction: 4-/5*  Adduction: 5/5  Opposition: 5/5    FUNCTION Normal Difficult Unable   Overhead reach [] [x] []   Underarm reach  [] [x] []   Groom/Dress [] [x] []   Bra/Shirt tuck [] [x] []   Lift/Carry [] [x] []    [] [] []     Outcome Measure: UEFS: 3/80, 96.25% deficit       Assessment: Nicky Albert is a 46 y.o. male with a hx of deQuervain's syndrome on the R. Pt has undergone 2 steroid injections and has been compliant with use of his spica splint. Pt has not completed formal therapy and would benefit from a trial prior to surgical intervention. Pt demonstrates painful and limited AROM and strength of the R hand which is limiting his overall function. [address] rehab potential secondary to dx, yellow flags (fear of movement and worsening pain). A trial of conservative therapy will be completed in attempts to decrease pain and improve strength to improve overall function. Problems:    [x] ? Pain: 3-4/10 to 10  [x] ? ROM: painful and limited R wrist and thumb ROM; + Finkelsteins   [x] ? Strength: painful and weak R wrist and thumb and  strength   [x] ?  Function: Demonstrates/verbalizes understanding of HEP/Ed previously given. Treatment Plan:  [x] Therapeutic Exercise   01750  [] Iontophoresis: 4 mg/mL Dexamethasone Sodium Phosphate  mAmin  31895   [x] Therapeutic Activity  22305 [] Vasopneumatic cold with compression  83029    [] Gait Training   99220 [] Ultrasound   82483   [] Neuromuscular Re-education  78916 [] Electrical Stimulation Unattended  54922   [x] Manual Therapy  61183 [] Electrical Stimulation Attended  05145   [x] Instruction in HEP  [] Lumbar/Cervical Traction  23479   [] Aquatic Therapy   01676 [x] Cold/hotpack    [] Massage   08756      [] Dry Needling, 1 or 2 muscles  24872   [] Biofeedback, first 15 minutes   35239  [] Biofeedback, additional 15 minutes   48053 [] Dry Needling, 3 or more muscles  94699     []  Medication allergies reviewed for use of    Dexamethasone Sodium Phosphate 4mg/ml     with iontophoresis treatments. Pt is not allergic. Frequency: 1 x/week for 8 visits    Todays Treatment:  Modalities: ice massage- demo and instructions-- pt did report some thumb numbness-- advised pt to monitor these changes when completing ice massage at home.   Exercises:  Exercise Reps/ Time Weight/ Level Comments   Manual  19'  IASTM to the APL and EPL tendons  Thumb IP distraction  PROM IP flexion, MP flexion, MP add, wrist UD, IP + MP flexion   Self-PROM x  Pt demonstrated proper PROM of R thumb with segmental PROM of IP, MP   Education   Wear brace throughout the day               Other:    Specific Instructions for next treatment:    Evaluation Complexity:  History (Personal factors, comorbidities) [] 0 [] 1-2 [x] 3+   Exam (limitations, restrictions) [] 1-2 [] 3 [x] 4+   Clinical presentation (progression) [x] Stable [] Evolving  [] Unstable   Decision Making [x] Low [] Moderate [] High    [x] Low Complexity [] Moderate Complexity [] High Complexity         Treatment Charges: Mins Units   Evaluation  [x] Low  [] Mod  [] High ----- 1   [] Modalities     [x]  Ther Exercise 6 1   [x]  Manual Therapy 19 1   []  Ther Activities     []  Aquatics     []  Vasocompression     []  Other       Time in: 300    Time Out: 415    Electronically signed by: Judah Pinon PT        Physician Signature:________________________________Date:__________________  By signing above or cosigning this note, I have reviewed this plan of care and certify a need for medically necessary rehabilitation services.      *PLEASE SIGN ABOVE AND FAX BACK ALL PAGES*

## 2021-12-21 ENCOUNTER — HOSPITAL ENCOUNTER (OUTPATIENT)
Dept: MRI IMAGING | Facility: CLINIC | Age: 51
Discharge: HOME | End: 2021-12-23
Payer: MEDICARE

## 2021-12-21 DIAGNOSIS — M65.4 DE QUERVAIN'S TENOSYNOVITIS, RIGHT: ICD-10-CM

## 2021-12-21 PROCEDURE — 73221 MRI JOINT UPR EXTREM W/O DYE: CPT

## 2021-12-22 ENCOUNTER — HOSPITAL ENCOUNTER (OUTPATIENT)
Dept: PHYSICAL THERAPY | Facility: CLINIC | Age: 51
Setting detail: THERAPIES SERIES
Discharge: HOME | End: 2021-12-22
Payer: MEDICARE

## 2021-12-22 PROCEDURE — 97140 MANUAL THERAPY 1/> REGIONS: CPT

## 2021-12-22 PROCEDURE — 97110 THERAPEUTIC EXERCISES: CPT

## 2021-12-22 NOTE — FLOWSHEET NOTE
[] [facility]) - [facility] &  Therapy  [address]  [phone]  F: [phone] [x] [address]  [facility] 36   [address]  P: [phone]  F: [phone] [] [address] &  Therapy  [address]  P: [phone]  F: [phone] [] [address]  P: [phone]  F: [phone] [] [address]  [facility]   [address]   [place]: [phone]  F: [phone]      Physical Therapy Daily Treatment Note    Date:  2021  Patient Name:  Rola Chang    :  1970  MRN: [id number]  Physician: Viviana Rhoades MD                                    Insurance: [employer] (30 vs/lucas year)  Medical Diagnosis: M54.2, G89.29 (ICD-10-CM) - Chronic neck pain; M77.12 (ICD-10-CM) - Left lateral epicondylitis  Medical Diagnosis: M65.4 (ICD-10-CM) - De Quervain's tenosynovitis, right                       Rehab Codes: M54.2, M79.601, M79.602, M62.81  Rehab Codes: X03.179, M25.641, M79.641, M79.644, M62.81    Onset Date: 21        Onset Date: 12/10/21            Next 's appt: TBD  Visit# / total visits: -   RUE: 2/    Cancels/No Shows: 0    Subjective:    Pain:  [x] Yes  [] No Location: neck, L UE  Pain Rating: (0-10 scale) not quantified/10  Pain altered Tx:  [x] No  [] Yes  Action:     Comments:  Pt reports the pain is getting worse. Pt reports \"I'm not happy. You could give me anything in the world and I wouldn't take any of it. I just want my hand to work. \" Pt reports he doesn't think he is going to his family's [place] because he doesn't feel good and the pain is too much. Pt notes he did have his MRI yesterday.     Objective:  Modalities:    Precautions:  Exercises: bolded completed 21 [employer] access code [email or web address]   Exercise Reps/ Time Weight/ Level Comments   UBE 6' lv2.5 Switching every minute    Manual x  PA glides to the lower c-spine and upper t-spine; unilateral PA to upper t-spine as indicated      Standing:      Corner pec stretch 3x 30\" hold    scaption  2x8 lime    Shoulder press 2x10 3# Head against towel on wall   Forward and lateral raises 10x ea 3# ea    Serratus wall slides 20x blue Increased resistance 10/4   Wall clocks 5x ea     Wall push ups 2x10  Hands on bars   Body blade arms at side 2x20\"     bodyblade arms out front 3x30\"     Sitting       SB seated shoulder extension 2x10 plum    SB seated B ER 2x10 blue    SB seated bicep curls 2x10 5#          sidelying      Open books 10x ea     sidebend  10x ea      Prone on SB   Reviewed for HEP   Shoulder extension x     ROWS x     H. Abd x     Y's x     W's  8X2 2# Added weight 10/4  Cues to decrease squeeze if painful   RAIZA chin tucks 10x 5\"    prone cervical extension 2x10  Head and neck off the table    Quadruped cat/camels for scapular protraction and retraction 10x  Fists to avoid extension of wrists   AMANDA rows 2x10 3 pl    AMANDA lat pulldown 2x10 2 pl    AMANDA triceps press 20x 2 pl Increase next session       RUE Exercises:  Exercise Reps/ Time Weight/ Level Comments   Manual  24'   IASTM to the APL and EPL tendons  Thumb IP distraction  PROM IP flexion, MP flexion, MP add, wrist UD, IP + MP flexion   Isometrics 2 sets 10x5\"   Thumb extension, thumb abd, wrist radial deviation   Thumb and wrist AROM 5-10 ea  DIP flexion-> ext  MCP flexion-> ext  MCP ext--> flex  MCP Abd  Wrist RD- UD                        Other: CP 10 min end of session      Treatment Charges: Mins Units   [x]  Modalities- CP 10 0   [x]  Ther Exercise 17 1   [x]  Manual Therapy 24 2   []  Ther Activities     []  Aquatics     []  Vasocompression     []  Neuro     Total Treatment time 41 3       Assessment: [x] Progressing toward goals. [x] No change.  Pt arrives this date with reports of the pain worsening along with a more depressive demeanor. Pt with continued tenderness over the radial aspect of the wrist and EPL/APL tendons. Manual therapy to the tendons and surrounding tissues and joints was completed to improve blood flow, decrease adhesions, and improve mobility. Block PROM of the thumb joints was completed within a pain-free range to improve both joint and tendon mobility. Progressive improvements in ROM is noted without muscle guarding, however, ulnar deviation continues to be limited based on muscle guarding. Also implemented thumb and wrist isometrics as noted above and AROM. Pt with gradual increase in resistance during isometrics as pt was fearful of pain. Pt was advised to move throughout pain-free motions. Pt provided with an updated HEP for exercise to complete passive, active ROM and isometric activities within pain-free range. Due to worsening of symptoms and pt's rather low expectations of recovery, pt has been advised to obtain a referral to a hand surgeon. Pt is encouraged to continue therapy if it helps with pain management. [] Other:   Problems:    [x] ? Pain: neck and LUE pain   [x] ? ROM: decreased neck rotation and decreased flexibility in UEs  [x] ? Strength: decreased and painful  strength; decreased deep neck flexor endurance  [x] ? Function: UEFS: 32.5% impairment; NDI: 56% impairment         UEs Short term goals: MEET IN 8 VISITS- as of 8/30/21 Status   Pain: Pt will report less than or equal to 5-6/10 left elbow pain at worst throughout the day in order to improve tolerance to use of UEs for ADLs, housework, and lifting. Met    ROM: Pt will demonstrate a negative Finkelstein test on BUE in order to improve ability to grasp the UEs with minimal discomfort.   Not met bilaterally,greater on the R   Strength: Pt will demonstrate ability to complete gripping tasks with less than or equal to 0-1/10 increase in left elbow pain in order to improve tolerance to ADLs. Met    Home Exercise Program: Pt will demonstrate independence with HEP. Met    Longer term goals: MEET IN 24 VISITS     Pain: Pt will report less than or equal to 3-4/10 left elbow pain at worst throughout the day in order to improve tolerance to use of UEs for ADLs, housework, and lifting. Ongoing 10/11   ROM: Pt will be able to demonstrate full AROM of the UEs with 0/10 pain in the medial and lateral elbow in order to improve use during ADLs and improve activity tolerance. MET 10/11 except for finkelsteins   Strength: Pt will demonstrate greater than or equal to 5/5 BUE strength with 0/10 elbow pain with testing and an improvement in left  strength by 5 lbs to improve tolerance to lifting, grasping, and ADL completion. MET 10/11   Outcome Score: Pt will improve the UEFS score by greater than or equal to 10% in order to demonstrate an improvement in function. Ongoing 10/11      Neck STG: (to be met in 8 treatments)  1. ? Pain: Pt will report less than or equal to 6-7/10 neck pain at most throughout the day and a decrease in headaches by 25% in order to improve tolerance to ADL completion and improve QOL. MET 9/16  2. ? ROM: Pt will be able to demonstrate neck rotation to the R and left to greater than or equal to 60 degrees in order to assist scanning the environment and safe driving. MET 9/16 (62° B)  3. ? Strength: Pt will be able to complete the 10 head lifts off the table with initiating movement from deep neck flexors vs global neck flexors in order to demonstrate neuromuscular re-education and postural awareness/strenght. MET 9/16  4. ? Function: Pt will score less than or equal to 50% on the NDI in order to demonstrate improved function on ADLs. MET 9/16 (24% impairment)  5.  Independent with Home Exercise Programs  LTG: (to be met in 24 treatments)  1. ? Pain: Pt will report less than or equal 4-5/10 neck pain at most throughout the day and a decrease in headaches by 50% in continue to demonstrate compliance with use of splint. Ongoing                  Patient goals: improve pain          Pt. Education:  [x] Yes  [] No  [x] Reviewed Prior HEP/Ed. Method of Education: [x] Verbal-   [x] Demo  [x] Written  8/26/21: prone scapular retractions; prone extension, prone h. Abd, prone Ws  10/11: wall angels, serratus slides, push ups, front raises, lateral raises  12/7: access code: [email or web address]  12/22: thumb AROM/isometrics  Comprehension of Education:  [x] Verbalizes understanding. [] Demonstrates understanding. [x] Needs review  [] Demonstrates/verbalizes HEP/Ed previously given. Plan: [x] Continue current frequency toward long and short term goals.     [x] Specific Instructions for subsequent treatments: progress weight; PNF patterns      Time In: 205 pm             Time Out: pm     Electronically signed by: Alfonzo Singh, PT

## 2021-12-23 DIAGNOSIS — M65.4 DE QUERVAIN'S TENOSYNOVITIS, RIGHT: Primary | ICD-10-CM

## 2021-12-28 ENCOUNTER — HOSPITAL ENCOUNTER (OUTPATIENT)
Dept: PHYSICAL THERAPY | Facility: CLINIC | Age: 51
Setting detail: THERAPIES SERIES
Discharge: HOME | End: 2021-12-28
Payer: MEDICARE

## 2021-12-28 PROCEDURE — 97110 THERAPEUTIC EXERCISES: CPT

## 2021-12-28 PROCEDURE — 97140 MANUAL THERAPY 1/> REGIONS: CPT

## 2021-12-28 NOTE — DISCHARGE SUMMARY
[] [facility]) - [facility] &  Therapy  [address]  [phone]  F: [phone] [x] [address]  [facility] 36   [address]  P: [phone]  F: [phone] [] [address] &  Therapy  [address]  P: [phone]  F: [phone] [] [address]  P: [phone]  F: [phone] [] [address]  [address] [facility]   [address]   [place]: [phone]  F: [phone]      Physical Therapy Discharge Note    Date: 2021      Patient: Bobby Tomlinson  : 1970  MRN: [id number]    Physician: Cee Rose MD                                    Insurance: [employer] ( vs/ year)  Medical Diagnosis: M54.2, G89.29 (ICD-10-CM) - Chronic neck pain; M77.12 (ICD-10-CM) - Left lateral epicondylitis                      Rehab Codes: M54.2, M79.601, M79.602, M62.81  Onset Date: 21                Next 's appt: TBD  Visit# / total visits: -      Cancels/No Shows: 0     Date of initial visit: 21                Date of final visit: 21     Subjective:    Pain:  [x]? Yes  []? No   Location: neck, L UE               Pain Rating: (0-10 scale) not quantified/10  Pain altered Tx:  [x]? No  []? Yes  Action:   Comments: Pt continues to have some neck stiffness but follows up with his chiropractor. Pt also notes barely any left elbow pain unless he doesn't take his Celebrex. Objective:  Cervical rotation: 80 deg left; 70 deg R  Able to maintain position for 10\" without issue  Deep neck flexor endurance test\" 55\"    Assessment:  Pt has demonstrated an overall improvement in postural muscle strength of the cervical and scapulothoracic spine.  Pt demonstrates improvements in cervical AROM and ability to sustain positions without aggravation of symptoms. Pt was also able to demonstrate an improvement in deep neck flexor endurance test and held positioning for 55\" before failure. Due to overall improvements in strength and stability of the neck, this diagnosis along with the LUE will be discharged in order to focus on the RUE. Problems:    [x]? ? ? Pain: neck and LUE pain   [x]? ? ? ROM: decreased neck rotation and decreased flexibility in UEs  [x]? ? ? Strength: decreased and painful  strength; decreased deep neck flexor endurance  [x]? ? ? Function: UEFS: 32.5% impairment; NDI: 56% impairment        UEs Short term goals: MEET IN 8 VISITS- as of 8/30/21 Status   Pain: Pt will report less than or equal to 5-6/10 left elbow pain at worst throughout the day in order to improve tolerance to use of UEs for ADLs, housework, and lifting. Met    ROM: Pt will demonstrate a negative Finkelstein test on BUE in order to improve ability to grasp the UEs with minimal discomfort.  Not met bilaterally,greater on the R   Strength: Pt will demonstrate ability to complete gripping tasks with less than or equal to 0-1/10 increase in left elbow pain in order to improve tolerance to ADLs. Met    Home Exercise Program: Pt will demonstrate independence with HEP. Met    Longer term goals: MEET IN 24 VISITS     Pain: Pt will report less than or equal to 3-4/10 left elbow pain at worst throughout the day in order to improve tolerance to use of UEs for ADLs, housework, and lifting.  MET   ROM: Pt will be able to demonstrate full AROM of the UEs with 0/10 pain in the medial and lateral elbow in order to improve use during ADLs and improve activity tolerance.  MET 10/11 except for finkelsteins   Strength: Pt will demonstrate greater than or equal to 5/5 BUE strength with 0/10 elbow pain with testing and an improvement in left  strength by 5 lbs to improve tolerance to lifting, grasping, and ADL completion.  MET 10/11   Outcome Score: Pt will improve the UEFS score by greater than or equal to 10% in order to demonstrate an improvement in function. Ongoing 10/11      Neck STG: (to be met in 8 treatments)  1. ? Pain: Pt will report less than or equal to 6-7/10 neck pain at most throughout the day and a decrease in headaches by 25% in order to improve tolerance to ADL completion and improve QOL. MET 9/16  2. ? ROM: Pt will be able to demonstrate neck rotation to the R and left to greater than or equal to 60 degrees in order to assist scanning the environment and safe driving. MET 9/16 (62° B)  3. ? Strength: Pt will be able to complete the 10 head lifts off the table with initiating movement from deep neck flexors vs global neck flexors in order to demonstrate neuromuscular re-education and postural awareness/strenght. MET 9/16  4. ? Function: Pt will score less than or equal to 50% on the NDI in order to demonstrate improved function on ADLs. MET 9/16 (24% impairment)  5. Independent with Home Exercise Programs  LTG: (to be met in 24 treatments)  1. ? Pain: Pt will report less than or equal 4-5/10 neck pain at most throughout the day and a decrease in headaches by 50% in order to improve tolerance to ADL completion and improve QOL.  Intermittently met 12/28  2. ? ROM: Pt will demonstrate cervical ROM to greater than or equal to 70 degrees rotation (B) and maintain for 10\" and flexion, extension, and SB without pain/reports of tightness in order to improve tolerance to driving and completing ADLs.  MET 12/28  3. ? Strength: Pt will demonstrate ability to complete deep neck flexor endurance test for greater than or equal to 38\" without an increase in neck pain in order to demonstrate improved postural strength and control when completing ADLs. MET 12/28 (55\"  4. ? Function: Pt will score less than or equal to 45% on the NDI in order to demonstrate improved function with ADLs.  MET 10/11                    Patient goals: to feel better, strong without pain            Treatment to Date:  [x] Therapeutic Exercise    [] Modalities:  [] Therapeutic Activity    [] Ultrasound  [] Electrical Stimulation  [] Gait Training     [] Massage       [] Lumbar/Cervical Traction  [x] Neuromuscular Re-education [] Cold/hotpack [] Iontophoresis: 4 mg/mL  [x] Instruction in Home Exercise Program                     Dexamethasone Sodium  [x] Manual Therapy             Phosphate 40-80 mAmin  [] Aquatic Therapy                   [] Vasocompression/    [] Other:             Game Ready    Discharge Status:     [] Pt recovered from conditions. Treatment goals were met. [x] Pt received maximum benefit. No further therapy indicated at this time. [x] Pt to continue exercise/home instructions independently. [] Therapy interrupted due to:    [] Pt has 2 or more no shows/cancels, is discontinued per our policy. [] Pt has completed prescribed number of treatment sessions. [] Other:         Electronically signed by Alfonzo Singh PT on 12/28/2021 at 4:27 PM      If you have any questions or concerns, please don't hesitate to call.   Thank you for your referral.

## 2021-12-28 NOTE — FLOWSHEET NOTE
[] [facility]) - [facility] &  Therapy  [address]  [phone]  F: [phone] [x] [address]  [facility]   [address]  P: [phone]  F: [phone] [] [address] &  Therapy  [address]  P: [phone]  F: [phone] [] [address]  P: [phone]  F: [phone] [] [address]  [facility]   [address]   [place]: [phone]  F: [phone]      Physical Therapy Daily Treatment Note    Date:  2021  Patient Name:  Brittany Sousa    :  1970  MRN: [id number]  Physician: Ulysses Hones, MD                                    Insurance: [employer] (30 vs/ year)  Medical Diagnosis: M54.2, G89.29 (ICD-10-CM) - Chronic neck pain; M77.12 (ICD-10-CM) - Left lateral epicondylitis  Medical Diagnosis: M65.4 (ICD-10-CM) - De Quervain's tenosynovitis, right                       Rehab Codes: M54.2, M79.601, M79.602, M62.81  Rehab Codes: Y82.093, M25.641, M79.641, M79.644, M62.81    Onset Date: 21        Onset Date: 12/10/21            Next 's appt: TBD  Visit# / total visits: -   RUE: 3    Cancels/No Shows: 0    Subjective:    Pain:  [x] Yes  [] No Location: neck, L UE  Pain Rating: (0-10 scale) not quantified/10  Pain altered Tx:  [x] No  [] Yes  Action:     Comments:  Pt reports the neck is pressure and has an appt with his chiropractor. Pt states he has an appt with Dr. Pierce Mora this Thursday regarding the next steps for his DeQuervain's symptoms.     Objective:  Modalities:    Precautions:  Exercises: bolded completed [employer] access code [email or web address]   Exercise Reps/ Time Weight/ Level Comments   UBE 6' lv2.5 Switching every minute    Manual x  PA glides to the lower c-spine and upper t-spine; unilateral PA to upper t-spine as indicated      Standing:      Corner pec stretch 3x 30\" hold    scaption  2x8 lime    Shoulder press 2x10 3# Head against towel on wall   Forward and lateral raises 10x ea 3# ea    Serratus wall slides 20x blue Increased resistance 10/4   Wall clocks 5x ea     Wall push ups 2x10  Hands on bars   Body blade arms at side 2x20\"     bodyblade arms out front 3x30\"     Sitting       SB seated shoulder extension 2x10 plum    SB seated B ER 2x10 blue    SB seated bicep curls 2x10 5#          sidelying      Open books 10x ea     sidebend  10x ea      Prone on SB   Reviewed for HEP   Shoulder extension x     ROWS x     H. Abd x     Y's x     W's  8X2 2# Added weight 10/4  Cues to decrease squeeze if painful   RAIZA chin tucks 10x 5\"    prone cervical extension 2x10  Head and neck off the table    Quadruped cat/camels for scapular protraction and retraction 10x  Fists to avoid extension of wrists   AMANDA rows 2x10 3 pl    AMANDA lat pulldown 2x10 2 pl    AMANDA triceps press 20x 2 pl Increase next session       RUE Exercises:  Exercise Reps/ Time Weight/ Level Comments   UBE 4'  Change every minute    Manual  23   IASTM to the APL and EPL tendons  Thumb IP distraction  PROM IP flexion, MP flexion, MP add, wrist UD, IP + MP flexion   Isometrics 2 sets 10x5\"   Thumb extension, thumb abd, wrist radial deviation   Thumb and wrist AROM 2x10  DIP flexion-> ext  MCP flexion-> ext  MCP ext--> flex  MCP Abd  Wrist RD- UD   Wrist flexion  Wrist ext  Sup/pronation    banded finger flexion/ext 10x2 yellow      pinch 2x10+  yellow         Treatment Charges: Mins Units   []  Modalities- CP     [x]  Ther Exercise 30 2   [x]  Manual Therapy 23 2   []  Ther Activities     []  Aquatics     []  Vasocompression     []  Neuro     Total Treatment time 53 4       Assessment: [x] Progressing toward goals.  Pt arrives with a slight improvement in demeanor and a half to full point improvement in wrist pain from the last session. Pt continues to hold the R thumb in a guarded position along with continued pain with flexion and adduction. Continued to complete manual therapy to the R thumb and wrist joints and soft tissue. Isolated thumb ROM activities was completed both passively and actively. Progressed to AROM of the R wrist with notable muscle fasciculations with eccentric control, primarily with extension. Due to constant wear of the R wrist brace, atrophy of the forearm musculature is noted. Pt was able to progress to resistance with finger extension and pinch . Pt advised to focus on controlled eccentric return. Pt is provided with a yellow rubber band for home use. Pt will be placed on hold until appt with hand surgeon. [] No change. [x] Other: :Pt has demonstrated an overall improvement in postural muscle strength of the cervical and scapulothoracic spine. Pt demonstrates improvements in cervical AROM and ability to sustain positions without aggravation of symptoms. Pt was also able to demonstrate an improvement in deep neck flexor endurance test and held positioning for 55\" before failure. Due to overall improvements in strength and stability of the neck, this diagnosis along with the LUE will be discharged in order to focus on the RUE. Problems:    [x] ? Pain: neck and LUE pain   [x] ? ROM: decreased neck rotation and decreased flexibility in UEs  [x] ? Strength: decreased and painful  strength; decreased deep neck flexor endurance  [x] ? Function: UEFS: 32.5% impairment; NDI: 56% impairment       Discharged 12/28/21  UEs Short term goals: MEET IN 8 VISITS- as of 8/30/21 Status   Pain: Pt will report less than or equal to 5-6/10 left elbow pain at worst throughout the day in order to improve tolerance to use of UEs for ADLs, housework, and lifting. Met    ROM: Pt will demonstrate a negative Finkelstein test on BUE in order to improve ability to grasp the UEs with minimal discomfort.   Not met Exercise Programs  LTG: (to be met in 24 treatments)  1. ? Pain: Pt will report less than or equal 4-5/10 neck pain at most throughout the day and a decrease in headaches by 50% in order to improve tolerance to ADL completion and improve QOL. Intermittently met 12/28  2. ? ROM: Pt will demonstrate cervical ROM to greater than or equal to 70 degrees rotation (B) and maintain for 10\" and flexion, extension, and SB without pain/reports of tightness in order to improve tolerance to driving and completing ADLs. MET 12/28  3. ? Strength: Pt will demonstrate ability to complete deep neck flexor endurance test for greater than or equal to 38\" without an increase in neck pain in order to demonstrate improved postural strength and control when completing ADLs. MET 12/28 (55\"  4. ? Function: Pt will score less than or equal to 45% on the NDI in order to demonstrate improved function with ADLs. MET 10/11                    Patient goals: to feel better, strong without pain     Problems:    [x]? ? Pain: 3-4/10 to 9/10  [x]? ? ROM: painful and limited R wrist and thumb ROM; + Finkelsteins   [x]? ? Strength: painful and weak R wrist and thumb and  strength   [x]? ? Function: UEFS: 3/80, 96.25% deficit         Short term goals: MEET IN 8 VISITS Status   Pain: Pt will report an average of 1-2/10 R wrist pain at rest and 4-5/10 R wrist pain with activity in order to improve activity tolerance and ADL completion. Ongoing   ROM: Pt will demonstrate R wrist/thum AROM in order to improve functional uses of the RUE.  - Flexion: 65 deg  - Extension: 45 deg  - RD: 25 deg  - UD: 10-12 deg without pain  Thumb  - Flexion: 10 deg  - Abduction: 0/10 pain  Ongoing   Strength: Pt will demonstrate 5/5 wrist and hand strength with 0/10 reports of pain with resistance in order to demonstrate improved functional use of the RUE.    Pt will demonstrate a 50% improvement in  strength and only 1-2/10 increase in wrist pain in order to improve tolerance to ADLs. Ongoing   Home Exercise Program: Pt will demonstrate independence with HEP and will continue to demonstrate compliance with use of splint. Ongoing                  Patient goals: improve pain          Pt. Education:  [x] Yes  [] No  [x] Reviewed Prior HEP/Ed. Method of Education: [x] Verbal-   [] Demo  [] Written  8/26/21: prone scapular retractions; prone extension, prone h. Abd, prone Ws  10/11: wall angels, serratus slides, push ups, front raises, lateral raises  12/7: access code: [email or web address]  12/22: thumb AROM/isometrics  Comprehension of Education:  [x] Verbalizes understanding. [] Demonstrates understanding. [x] Needs review  [] Demonstrates/verbalizes HEP/Ed previously given. Plan: [x] Continue current frequency toward long and short term goals.    - Discharging neck and left elbow and focusing on R wrist pending on appointment with hand surgeon    [x] Specific Instructions for subsequent treatments: progress weight; PNF patterns      Time In: 305 pm             Time Out:  406pm     Electronically signed by: Ed Garcia PT

## 2021-12-29 ENCOUNTER — OFFICE VISIT (OUTPATIENT)
Dept: ORTHOPEDIC SURGERY | Age: 51
End: 2021-12-29
Payer: MEDICARE

## 2021-12-29 VITALS
WEIGHT: 207 LBS | DIASTOLIC BLOOD PRESSURE: 86 MMHG | SYSTOLIC BLOOD PRESSURE: 133 MMHG | RESPIRATION RATE: 14 BRPM | HEIGHT: 68 IN | HEART RATE: 77 BPM | BODY MASS INDEX: 31.37 KG/M2

## 2021-12-29 DIAGNOSIS — M65.4 DE QUERVAIN'S TENOSYNOVITIS, RIGHT: Primary | ICD-10-CM

## 2021-12-29 PROCEDURE — 3017F COLORECTAL CA SCREEN DOC REV: CPT | Performed by: FAMILY MEDICINE

## 2021-12-29 PROCEDURE — G8427 DOCREV CUR MEDS BY ELIG CLIN: HCPCS | Performed by: FAMILY MEDICINE

## 2021-12-29 PROCEDURE — G8484 FLU IMMUNIZE NO ADMIN: HCPCS | Performed by: FAMILY MEDICINE

## 2021-12-29 PROCEDURE — 1036F TOBACCO NON-USER: CPT | Performed by: FAMILY MEDICINE

## 2021-12-29 PROCEDURE — G8417 CALC BMI ABV UP PARAM F/U: HCPCS | Performed by: FAMILY MEDICINE

## 2021-12-29 PROCEDURE — 99213 OFFICE O/P EST LOW 20 MIN: CPT | Performed by: FAMILY MEDICINE

## 2021-12-29 NOTE — PROGRESS NOTES
Sports Medicine Consultation    CHIEF COMPLAINT:  Follow-up (R Wrist MRI Review)      HPI:  The patient is a 46 y.o. male who is being seen for   established patient being seen for regarding follow up of a pre-existing problem of  r wrist pain. The patient is a right hand dominant male who has had right hand/wrist pain for months. As far as trauma to the hand the patient indicates no new trauma. The following medications/interventions have been tried: ot, inj, brace and nsaids without benefit. he has a past medical history of ALT (SGPT) level raised, Bilateral thoracic back pain, Bronchitis, Contusion of right knee, DDD (degenerative disc disease), cervical, Dyslipidemia, Dyspepsia, Fractured toe, Herniated cervical disc, Herpes zoster without complication, Hypoglycemia, Hypogonadism in male, Immunization counseling, Insomnia, Leukocytopenia, unspecified, Leukopenia, Myofascial muscle pain, Need for shingles vaccine, Pain in abdominal muscle of right flank, Pharyngoesophageal dysphagia, Post laminectomy syndrome, Prostate cancer screening, and Rhinosinusitis. he has a past surgical history that includes lumbar laminectomy (); hernia repair (Bilateral, ); Colonoscopy; Endoscopy, colon, diagnostic (); Upper gastrointestinal endoscopy (N/A, 2017); and Cardiac catheterization (). Family history is unknown by patient.     Social History     Socioeconomic History    Marital status:      Spouse name: Not on file    Number of children: Not on file    Years of education: Not on file    Highest education level: Not on file   Occupational History    Not on file   Tobacco Use    Smoking status: Former Smoker     Quit date: 1997     Years since quittin.0    Smokeless tobacco: Never Used   Vaping Use    Vaping Use: Never used   Substance and Sexual Activity    Alcohol use: No    Drug use: No    Sexual activity: Yes     Partners: Female   Other Topics Concern  Not on file   Social History Narrative    Not on file     Social Determinants of Health     Financial Resource Strain:     Difficulty of Paying Living Expenses: Not on file   Food Insecurity:     Worried About Running Out of Food in the Last Year: Not on file    Brooke of Food in the Last Year: Not on file   Transportation Needs:     Lack of Transportation (Medical): Not on file    Lack of Transportation (Non-Medical):  Not on file   Physical Activity:     Days of Exercise per Week: Not on file    Minutes of Exercise per Session: Not on file   Stress:     Feeling of Stress : Not on file   Social Connections:     Frequency of Communication with Friends and Family: Not on file    Frequency of Social Gatherings with Friends and Family: Not on file    Attends Jew Services: Not on file    Active Member of [address] [place] [address] [employer] Group or Organizations: Not on file    Attends Club or Organization Meetings: Not on file    Marital Status: Not on file   Intimate Partner Violence:     Fear of Current or Ex-Partner: Not on file    Emotionally Abused: Not on file    Physically Abused: Not on file    Sexually Abused: Not on file   Housing Stability:     Unable to Pay for Housing in the Last Year: Not on file    Number of [place] in the Last Year: Not on file    Unstable Housing in the Last Year: Not on file       Current Outpatient Medications   Medication Sig Dispense Refill    Diclofenac Sodium (PENNSAID) 2 % SOLN Apply small amount to painful area twice daily 20 g 1    Cream Base (SALT STABLE LS ADVANCED) CREA       amitriptyline (ELAVIL) 75 MG tablet Take 1 tablet by mouth 2 times daily 60 tablet 3    Cholecalciferol (VITAMIN D3) 125 MCG (5000 UT) CAPS Take 1 capsule by mouth Daily 30 capsule 3    omeprazole (PRILOSEC) 40 MG delayed release capsule TAKE ONE CAPSULE BY MOUTH DAILY 30 capsule 3    fluticasone (FLONASE) 50 MCG/ACT nasal spray 1 spray by Nasal route daily 1 each 3    cyclobenzaprine (FLEXERIL) 5 MG tablet Take 1 tablet by mouth 3 times daily      RESTASIS 0.05 % ophthalmic emulsion       Omega-3 Fatty Acids (FISH OIL) 1000 MG CAPS Take 3,000 mg by mouth 3 times daily      Glucosamine-Chondroit-Vit C-Mn (GLUCOSAMINE 1500 COMPLEX PO) Take by mouth      Multiple Vitamins-Minerals (THERAPEUTIC MULTIVITAMIN-MINERALS) tablet Take 1 tablet by mouth daily       No current facility-administered medications for this visit. Allergies:  hehas No Known Allergies. ROS:  CV:  Denies chest pain; palpitations; shortness of breath; swelling of feet, ankles; and loss of consciousness. CON: Denies fever and dizziness. ENT:  Denies hearing loss / ringing, ear infections hoarseness, and swallowing problems. RESP:  Denies chronic cough, spitting up blood, and asthma/wheezing. GI: Denies abdominal pain, change in bowel habits, nausea or vomiting, and blood in stools. :  Denies frequent urination, burning or painful urination, blood in the urine, and bladder incontinence. NEURO:  Denies headache, memory loss, sleep disturbance, and tremor or movement disorder. PHYSICAL EXAM:    SKIN:  Intact without rashes, lesions or ulcerations. No obvious deformity or swelling. EYES:  Extraocular muscles intact. MOUTH: Oral mucosa moist.  No perioral lesions. PULM:  Respirations unlabored and regular. VASC:  Capillary refill less than 2 seconds. Hand/Wrist Exam  ROM:  Full flexor and extensor tendon function intact   Finger, hand, and wrist range of motion are WNL  Inspection-Deformity: no  Palpation-Tenderness: 1st dorsal compartment  There is is not thenar and is not interosseous atrophy. Strength- WNL  NEURO: Radial, ulnar, and median nerves are intact to motor and sensory testing. Two point discrimination is less than six mm. There is not decreased sensation to light touch and pinprick in the median and ulnar nerve distribution. CTS: Tinel's test at the wrist is negative.     Flexion compression test negative  Phalen's test is negative. Finkelstein's test is positive. Elbow:  Range of motion and strength about the elbow is intact. Tinel's test is negative at the elbow. Cerv:  Full pain free range of motion with a negative Spurling's test.    RADIOLOGY: No results found. IMPRESSION:     1. De Quervain's tenosynovitis, right        PLAN:   We discussed some of the etiologies and natural histories of     ICD-10-CM    1. De Quervain's tenosynovitis, right  M65.4      We discussed the various treatment alternatives including anti-inflammatory medications, physical therapy, injections, further imaging studies and as a last resort surgery. At this point patient continues to fail conservative measures he would like another thumb spica splint and is seeking out surgical opinion as requested we will see him back otherwise as needed. Patient voiced understanding agreement this plan. No follow-ups on file. Please be aware portions of this note were completed using voice recognition software and unforeseen errors may have occurred    Electronically signed by Pinky Galloway DO, FAOASM  on 12/29/21 at 12:22 PM EST      No orders of the defined types were placed in this encounter.

## 2022-01-04 ENCOUNTER — OFFICE VISIT (OUTPATIENT)
Dept: PHYSICAL MEDICINE AND REHAB | Age: 52
End: 2022-01-04
Payer: MEDICARE

## 2022-01-04 VITALS
DIASTOLIC BLOOD PRESSURE: 83 MMHG | HEART RATE: 93 BPM | WEIGHT: 206.6 LBS | HEIGHT: 68 IN | SYSTOLIC BLOOD PRESSURE: 120 MMHG | BODY MASS INDEX: 31.31 KG/M2 | TEMPERATURE: 97.2 F

## 2022-01-04 DIAGNOSIS — M54.2 CHRONIC NECK PAIN: Primary | ICD-10-CM

## 2022-01-04 DIAGNOSIS — M77.02 MEDIAL EPICONDYLITIS OF LEFT ELBOW: ICD-10-CM

## 2022-01-04 DIAGNOSIS — G89.29 CHRONIC NECK PAIN: Primary | ICD-10-CM

## 2022-01-04 PROCEDURE — 1036F TOBACCO NON-USER: CPT | Performed by: STUDENT IN AN ORGANIZED HEALTH CARE EDUCATION/TRAINING PROGRAM

## 2022-01-04 PROCEDURE — 99214 OFFICE O/P EST MOD 30 MIN: CPT | Performed by: STUDENT IN AN ORGANIZED HEALTH CARE EDUCATION/TRAINING PROGRAM

## 2022-01-04 PROCEDURE — G8427 DOCREV CUR MEDS BY ELIG CLIN: HCPCS | Performed by: STUDENT IN AN ORGANIZED HEALTH CARE EDUCATION/TRAINING PROGRAM

## 2022-01-04 PROCEDURE — G8417 CALC BMI ABV UP PARAM F/U: HCPCS | Performed by: STUDENT IN AN ORGANIZED HEALTH CARE EDUCATION/TRAINING PROGRAM

## 2022-01-04 PROCEDURE — G8484 FLU IMMUNIZE NO ADMIN: HCPCS | Performed by: STUDENT IN AN ORGANIZED HEALTH CARE EDUCATION/TRAINING PROGRAM

## 2022-01-04 PROCEDURE — 3017F COLORECTAL CA SCREEN DOC REV: CPT | Performed by: STUDENT IN AN ORGANIZED HEALTH CARE EDUCATION/TRAINING PROGRAM

## 2022-01-04 RX ORDER — CELECOXIB 50 MG/1
50 CAPSULE ORAL DAILY
COMMUNITY
End: 2022-02-16 | Stop reason: DRUGHIGH

## 2022-01-04 NOTE — PROGRESS NOTES
[address] PHYSICAL MEDICINE AND REHABILITATION  [address] [place] [id number]  Dept: [phone]  Dept Fax: [phone]    Outpatient Followup Note    Tayler Calvillo is a 46y.o.-year-old male presenting for follow up of neck pain, left elbow pain, and right wrist pain. HPI:     He was last seen on 11/23/21 for follow up of neck pain, left elbow pain, and right wrist pain. Since that time, he reports continued posterior neck pain. He feels like he still has some restriction in ROM of the cervical spine as well. He also has ongoing pain in the left medial elbow. He denies any numbness/tingling in the bilateral upper limbs. He does feel like he has some weakness in the hands. He reports that going to the chiropractor seems to help with the neck pain, but he only has 15 visits per year covered by insurance. He also notes continued pain in the right lateral wrist, which is worse with pressure to that area. He states that physical therapy is on hold for now. He notes that the biomed topical compounded cream did not help with the pain. He has been referred to Dr. Asael Avila for discussion of surgical options for the right wrist pain.       Past Medical History:   Diagnosis Date    ALT (SGPT) level raised 4/6/2015    Bilateral thoracic back pain 9/27/2015    Bronchitis 12/13/2015    Contusion of right knee 3/26/2018    DDD (degenerative disc disease), cervical 4/6/2015    Dyslipidemia 4/6/2015    Dyspepsia 4/6/2015    Fractured toe 06/2020    Herniated cervical disc 4/6/2015    Herpes zoster without complication 5/31/1500    Hypoglycemia 6/1/2016    Hypogonadism in male     Immunization counseling 9/26/2017    Insomnia 9/27/2015    Leukocytopenia, unspecified 6/1/2016    Leukopenia 6/17/2016    Myofascial muscle pain 4/6/2015    Need for shingles vaccine 6/5/2018    Pain in abdominal muscle of right flank 4/13/2016    Pharyngoesophageal dysphagia 2017    Post laminectomy syndrome 10/17/2016    Prostate cancer screening 2016    Rhinosinusitis 2015      Past Surgical History:   Procedure Laterality Date    CARDIAC CATHETERIZATION  2006    COLONOSCOPY      ENDOSCOPY, COLON, DIAGNOSTIC  2008    HERNIA REPAIR Bilateral 2005    LUMBAR LAMINECTOMY  2013    by Dr. Massiel Gregory N/A 2017    EGD DILATION SAVORY performed by Erik Day MD at [address] [place] [address] History   Family history unknown: Yes     Social History     Socioeconomic History    Marital status:      Spouse name: Not on file    Number of children: Not on file    Years of education: Not on file    Highest education level: Not on file   Occupational History    Not on file   Tobacco Use    Smoking status: Former Smoker     Quit date: 1997     Years since quittin.0    Smokeless tobacco: Never Used   Vaping Use    Vaping Use: Never used   Substance and Sexual Activity    Alcohol use: No    Drug use: No    Sexual activity: Yes     Partners: Female   Other Topics Concern    Not on file   Social History Narrative    Not on file     Social Determinants of Health     Financial Resource Strain:     Difficulty of Paying Living Expenses: Not on file   Food Insecurity:     Worried About [facility] in the Last Year: Not on file    [address] in the Last Year: Not on file   Transportation Needs:     Lack of Transportation (Medical): Not on file    Lack of Transportation (Non-Medical):  Not on file   Physical Activity:     Days of Exercise per Week: Not on file    Minutes of Exercise per Session: Not on file   Stress:     Feeling of Stress : Not on file   Social Connections:     Frequency of Communication with Friends and Family: Not on file    Frequency of Social Gatherings with Friends and Family: Not on file    Attends Yarsanism Services: Not on file   [employer] or Organizations: Not on file    Attends Club or Organization Meetings: Not on file    Marital Status: Not on file   Intimate Partner Violence:     Fear of Current or Ex-Partner: Not on file    Emotionally Abused: Not on file    Physically Abused: Not on file    Sexually Abused: Not on file   Housing Stability:     Unable to Pay for Housing in the Last Year: Not on file    Number of [place] in the Last Year: Not on file    Unstable Housing in the Last Year: Not on file       No Known Allergies    Current Outpatient Medications   Medication Sig Dispense Refill    celecoxib (CELEBREX) 50 MG capsule Take 50 mg by mouth daily      Diclofenac Sodium (PENNSAID) 2 % SOLN Apply small amount to painful area twice daily 20 g 1    Cream Base (SALT STABLE LS ADVANCED) CREA       amitriptyline (ELAVIL) 75 MG tablet Take 1 tablet by mouth 2 times daily 60 tablet 3    Cholecalciferol (VITAMIN D3) 125 MCG (5000 UT) CAPS Take 1 capsule by mouth Daily 30 capsule 3    omeprazole (PRILOSEC) 40 MG delayed release capsule TAKE ONE CAPSULE BY MOUTH DAILY 30 capsule 3    fluticasone (FLONASE) 50 MCG/ACT nasal spray 1 spray by Nasal route daily 1 each 3    cyclobenzaprine (FLEXERIL) 5 MG tablet Take 1 tablet by mouth 3 times daily      RESTASIS 0.05 % ophthalmic emulsion       Omega-3 Fatty Acids (FISH OIL) 1000 MG CAPS Take 3,000 mg by mouth 3 times daily      Glucosamine-Chondroit-Vit C-Mn (GLUCOSAMINE 1500 COMPLEX PO) Take by mouth      Multiple Vitamins-Minerals (THERAPEUTIC MULTIVITAMIN-MINERALS) tablet Take 1 tablet by mouth daily       No current facility-administered medications for this visit. Subjective:      Review of Systems   Constitutional: Positive for activity change. Musculoskeletal: Positive for arthralgias and neck pain. Neurological: Positive for weakness. Negative for numbness.      Objective:     Physical Exam  /83   Pulse 93   Temp 97.2 °F (36.2 °C)   Ht 5' 8\" (1.727 m)   Wt 206 lb 9.6 oz (93.7 kg)   BMI 31.41 kg/m²     Constitutional: He appears well-developed and well-nourished. In no distress. HEENT: NCAT, EOM grossly intact. Hearing grossly intact. Pulmonary/Chest: Respirations WNL and unlabored. MSK:  Right wrist brace present. No tenderness to palpation of the cervical spine, bilateral paraspinal musculature, or bilateral trapezius muscles. Slightly decreased cervical spine ROM with left rotation; \"tightness\" present with ROM testing. Strength 5/5 in the bilateral upper limbs. Tenderness to palpation of the left medial epicondyle. No pain with passive or active ROM of the left wrist.  Neurological: He is alert. Sensation to light touch intact in bilateral upper limbs. Skin: Skin is warm dry and intact with good turgor. Psychiatric: He has a normal mood and affect. His behavior is normal. Thought content normal.    Nursing note and vitals reviewed. Reviewed notes from Dr. Fabiana Sexton, physical therapy. Imaging  MRI right wrist, 12/21/21:  Impression   Tenosynovitis involving the 1st dorsal extensor compartment with mild   enlargement of the abductor pollicis longus and extensor pollicis brevis   tendons compatible with de Quervain tenosynovitis       Assessment:       Diagnosis Orders   1. Chronic neck pain  MRI CERVICAL SPINE WO CONTRAST   2.  Medial epicondylitis of left elbow          Plan:      - Continue home exercise program as directed at physical therapy  - Placed order for MRI cervical spine, as patient has ongoing neck pain despite physical therapy  - Patient has an upcoming appointment with Dr. Merilee Bumpers for right [place] tenosynovitis      Orders Placed This Encounter   Procedures    MRI CERVICAL SPINE WO CONTRAST     Standing Status:   Future     Number of Occurrences:   1     Standing Expiration Date:   1/4/2023     Order Specific Question:   Reason for exam:     Answer:   Patient with chronic neck pain, little improvement with PT, has been many years since an MRI has been done, please evaluate for any abnormality       Return in about 1 month (around 2/4/2022).        Electronically signed by Boom Byrne MD on 1/13/2022 at 8:43 PM.

## 2022-01-12 ENCOUNTER — HOSPITAL ENCOUNTER (OUTPATIENT)
Dept: MRI IMAGING | Age: 52
Discharge: HOME | End: 2022-01-14
Payer: MEDICARE

## 2022-01-12 DIAGNOSIS — G89.29 CHRONIC NECK PAIN: ICD-10-CM

## 2022-01-12 DIAGNOSIS — M54.2 CHRONIC NECK PAIN: ICD-10-CM

## 2022-01-12 PROCEDURE — 72141 MRI NECK SPINE W/O DYE: CPT

## 2022-02-01 ENCOUNTER — OFFICE VISIT (OUTPATIENT)
Dept: PHYSICAL MEDICINE AND REHAB | Age: 52
End: 2022-02-01
Payer: MEDICARE

## 2022-02-01 VITALS
WEIGHT: 208.6 LBS | DIASTOLIC BLOOD PRESSURE: 85 MMHG | TEMPERATURE: 97.4 F | HEART RATE: 97 BPM | SYSTOLIC BLOOD PRESSURE: 123 MMHG | HEIGHT: 68 IN | BODY MASS INDEX: 31.61 KG/M2

## 2022-02-01 DIAGNOSIS — M77.02 MEDIAL EPICONDYLITIS OF LEFT ELBOW: ICD-10-CM

## 2022-02-01 DIAGNOSIS — G89.29 CHRONIC NECK PAIN: Primary | ICD-10-CM

## 2022-02-01 DIAGNOSIS — M54.2 CHRONIC NECK PAIN: Primary | ICD-10-CM

## 2022-02-01 PROCEDURE — G8417 CALC BMI ABV UP PARAM F/U: HCPCS | Performed by: STUDENT IN AN ORGANIZED HEALTH CARE EDUCATION/TRAINING PROGRAM

## 2022-02-01 PROCEDURE — 99214 OFFICE O/P EST MOD 30 MIN: CPT | Performed by: STUDENT IN AN ORGANIZED HEALTH CARE EDUCATION/TRAINING PROGRAM

## 2022-02-01 PROCEDURE — 3017F COLORECTAL CA SCREEN DOC REV: CPT | Performed by: STUDENT IN AN ORGANIZED HEALTH CARE EDUCATION/TRAINING PROGRAM

## 2022-02-01 PROCEDURE — G8427 DOCREV CUR MEDS BY ELIG CLIN: HCPCS | Performed by: STUDENT IN AN ORGANIZED HEALTH CARE EDUCATION/TRAINING PROGRAM

## 2022-02-01 PROCEDURE — G8484 FLU IMMUNIZE NO ADMIN: HCPCS | Performed by: STUDENT IN AN ORGANIZED HEALTH CARE EDUCATION/TRAINING PROGRAM

## 2022-02-01 PROCEDURE — 1036F TOBACCO NON-USER: CPT | Performed by: STUDENT IN AN ORGANIZED HEALTH CARE EDUCATION/TRAINING PROGRAM

## 2022-02-01 NOTE — PROGRESS NOTES
[facility],[address] PHYSICAL MEDICINE AND REHABILITATION  [address] [place] [address]  Dept: [phone]  Dept Fax: [phone]    Outpatient Followup Note    Coni Villalba is a 46y.o.-year-old male presenting for follow up of neck pain, left elbow pain, and right wrist pain. HPI:     He was last seen on 1/4/22 for follow up of neck pain, left elbow pain, and right wrist pain. Since that time, he reports feeling about the same overall. He states that the right wrist pain bothers him the most and that he saw the hand surgeon since the last visit. He states that he was instructed to complete stretching exercises for the right wrist for 1 month and if there is no improvement in pain, the plan is to proceed with surgery. He continues to report neck pain, left greater than right, as well as left medial elbow pain. He denies any numbness/tingling in the bilateral upper limbs but does note weakness in the bilateral hands.   He states that left medial elbow pain is brought out by making a fist with the left hand and with extension of the left wrist.      Past Medical History:   Diagnosis Date    ALT (SGPT) level raised 4/6/2015    Bilateral thoracic back pain 9/27/2015    Bronchitis 12/13/2015    Contusion of right knee 3/26/2018    DDD (degenerative disc disease), cervical 4/6/2015    Dyslipidemia 4/6/2015    Dyspepsia 4/6/2015    Fractured toe 06/2020    Herniated cervical disc 4/6/2015    Herpes zoster without complication 7/02/4622    Hypoglycemia 6/1/2016    Hypogonadism in male     Immunization counseling 9/26/2017    Insomnia 9/27/2015    Leukocytopenia, unspecified 6/1/2016    Leukopenia 6/17/2016    Myofascial muscle pain 4/6/2015    Need for shingles vaccine 6/5/2018    Pain in abdominal muscle of right flank 4/13/2016    Pharyngoesophageal dysphagia 6/12/2017    Post laminectomy syndrome 10/17/2016    Prostate cancer screening 2016    Rhinosinusitis 2015      Past Surgical History:   Procedure Laterality Date    CARDIAC CATHETERIZATION  2006    COLONOSCOPY      ENDOSCOPY, COLON, DIAGNOSTIC  2008    HERNIA REPAIR Bilateral 2005    LUMBAR LAMINECTOMY  2013    by Dr. Chivo Arvizu N/A 2017    EGD DILATION SAVORY performed by Julio César Borges MD at [facility] History   Family history unknown: Yes     Social History     Socioeconomic History    Marital status:      Spouse name: Not on file    Number of children: Not on file    Years of education: Not on file    Highest education level: Not on file   Occupational History    Not on file   Tobacco Use    Smoking status: Former Smoker     Quit date: 1997     Years since quittin.1    Smokeless tobacco: Never Used   Vaping Use    Vaping Use: Never used   Substance and Sexual Activity    Alcohol use: No    Drug use: No    Sexual activity: Yes     Partners: Female   Other Topics Concern    Not on file   Social History Narrative    Not on file     Social Determinants of Health     Financial Resource Strain:     Difficulty of Paying Living Expenses: Not on file   Food Insecurity:     Worried About [address] in the Last Year: Not on file    [facility] in the Last Year: Not on file   Transportation Needs:     Lack of Transportation (Medical): Not on file    Lack of Transportation (Non-Medical):  Not on file   Physical Activity:     Days of Exercise per Week: Not on file    Minutes of Exercise per Session: Not on file   Stress:     Feeling of Stress : Not on file   Social Connections:     Frequency of Communication with Friends and Family: Not on file    Frequency of Social Gatherings with Friends and Family: Not on file    Attends Sabianism Services: Not on file    Active Member of Clubs or Organizations: Not on file    Attends Club or Organization Meetings: Not on file  Marital Status: Not on file   Intimate Partner Violence:     Fear of Current or Ex-Partner: Not on file    Emotionally Abused: Not on file    Physically Abused: Not on file    Sexually Abused: Not on file   Housing Stability:     Unable to Pay for Housing in the Last Year: Not on file    Number of [place] in the Last Year: Not on file    Unstable Housing in the Last Year: Not on file       No Known Allergies    Current Outpatient Medications   Medication Sig Dispense Refill    Cholecalciferol (VITAMIN D3) 125 MCG (5000 UT) CAPS Take 1 capsule by mouth Daily 30 capsule 3    omeprazole (PRILOSEC) 40 MG delayed release capsule TAKE ONE CAPSULE BY MOUTH DAILY 30 capsule 3    amitriptyline (ELAVIL) 75 MG tablet Take 1 tablet by mouth 2 times daily 60 tablet 3    celecoxib (CELEBREX) 50 MG capsule Take 50 mg by mouth daily      Cream Base (SALT STABLE LS ADVANCED) CREA       fluticasone (FLONASE) 50 MCG/ACT nasal spray 1 spray by Nasal route daily 1 each 3    cyclobenzaprine (FLEXERIL) 5 MG tablet Take 1 tablet by mouth 3 times daily      RESTASIS 0.05 % ophthalmic emulsion       Omega-3 Fatty Acids (FISH OIL) 1000 MG CAPS Take 3,000 mg by mouth 3 times daily      Glucosamine-Chondroit-Vit C-Mn (GLUCOSAMINE 1500 COMPLEX PO) Take by mouth      Multiple Vitamins-Minerals (THERAPEUTIC MULTIVITAMIN-MINERALS) tablet Take 1 tablet by mouth daily      Diclofenac Sodium (PENNSAID) 2 % SOLN Apply small amount to painful area twice daily (Patient not taking: Reported on 2/1/2022) 20 g 1     No current facility-administered medications for this visit. Subjective:      Review of Systems   Musculoskeletal: Positive for arthralgias and neck pain. Neurological: Positive for weakness. Negative for numbness.      Objective:     Physical Exam  /85   Pulse 97   Temp 97.4 °F (36.3 °C)   Ht 5' 8\" (1.727 m)   Wt 208 lb 9.6 oz (94.6 kg)   BMI 31.72 kg/m²     Constitutional: He appears well-developed and well-nourished. In no distress. HEENT: NCAT, EOM grossly intact. Hearing grossly intact. Pulmonary/Chest: Respirations WNL and unlabored. MSK:  Right wrist brace present. No tenderness to palpation of the cervical spine or bilateral paraspinal musculature. Normal ROM of the cervical spine. Strength 5/5 in the bilateral upper limbs. Tenderness to palpation of the left medial epicondyle. Normal ROM of the left wrist.  Neurological: He is alert and oriented to person, place, and time. Sensation to light touch intact in the bilateral upper limbs. Skin: Skin is warm dry and intact with good turgor. Psychiatric: He has a normal mood and affect. His behavior is normal. Thought content normal.    Nursing note and vitals reviewed. Imaging  MRI cervical spine, 1/12/22:  Impression   1.  Multilevel degenerative changes in the cervical spine without significant   spinal canal stenosis at any level.       2.  Moderate to severe left C4-C5 neural foraminal narrowing. Assessment:       Diagnosis Orders   1. Chronic neck pain     2. Medial epicondylitis of left elbow          Plan:      - Reviewed MRI cervical spine with the patient and answered his questions  - Increase celebrex to 100mg daily  - Could consider gabapentin in the future for neck pain  - Could consider referral to pain management for possible neck injections in the future (patient is not interested in injections at this time)  - Follow up with Dr. Eduar Peña as directed      Return in about 1 month (around 3/1/2022).        Electronically signed by Toby Hu MD on 2/8/2022 at 10:50 PM.

## 2022-02-01 NOTE — PATIENT INSTRUCTIONS
- Increase celebrex to 100mg daily    Please call the office 3-4 days before you will be running out of this medication, so that a new prescription can be written.

## 2022-02-14 PROBLEM — F33.41 RECURRENT MAJOR DEPRESSIVE DISORDER, IN PARTIAL REMISSION (HCC): Status: ACTIVE | Noted: 2022-02-14

## 2022-02-16 RX ORDER — CELECOXIB 100 MG/1
100 CAPSULE ORAL DAILY
Qty: 30 CAPSULE | Refills: 0 | Status: SHIPPED | OUTPATIENT
Start: 2022-02-16 | End: 2022-04-04

## 2022-02-16 NOTE — TELEPHONE ENCOUNTER
Pt called for refill of celebrex. He states that dr. Hayden Tirado was going to increase to 100 mg. Ordered entered for celebrex 100 mg daily.

## 2022-03-01 ENCOUNTER — OFFICE VISIT (OUTPATIENT)
Dept: PHYSICAL MEDICINE AND REHAB | Age: 52
End: 2022-03-01
Payer: MEDICARE

## 2022-03-01 VITALS
DIASTOLIC BLOOD PRESSURE: 84 MMHG | BODY MASS INDEX: 32.22 KG/M2 | TEMPERATURE: 97.3 F | HEART RATE: 87 BPM | WEIGHT: 212.6 LBS | SYSTOLIC BLOOD PRESSURE: 120 MMHG | HEIGHT: 68 IN

## 2022-03-01 DIAGNOSIS — G89.29 CHRONIC NECK PAIN: Primary | ICD-10-CM

## 2022-03-01 DIAGNOSIS — M54.2 CHRONIC NECK PAIN: Primary | ICD-10-CM

## 2022-03-01 DIAGNOSIS — M77.02 MEDIAL EPICONDYLITIS OF LEFT ELBOW: ICD-10-CM

## 2022-03-01 PROCEDURE — 99214 OFFICE O/P EST MOD 30 MIN: CPT | Performed by: STUDENT IN AN ORGANIZED HEALTH CARE EDUCATION/TRAINING PROGRAM

## 2022-03-01 PROCEDURE — G8484 FLU IMMUNIZE NO ADMIN: HCPCS | Performed by: STUDENT IN AN ORGANIZED HEALTH CARE EDUCATION/TRAINING PROGRAM

## 2022-03-01 PROCEDURE — 3017F COLORECTAL CA SCREEN DOC REV: CPT | Performed by: STUDENT IN AN ORGANIZED HEALTH CARE EDUCATION/TRAINING PROGRAM

## 2022-03-01 PROCEDURE — 1036F TOBACCO NON-USER: CPT | Performed by: STUDENT IN AN ORGANIZED HEALTH CARE EDUCATION/TRAINING PROGRAM

## 2022-03-01 PROCEDURE — G8417 CALC BMI ABV UP PARAM F/U: HCPCS | Performed by: STUDENT IN AN ORGANIZED HEALTH CARE EDUCATION/TRAINING PROGRAM

## 2022-03-01 PROCEDURE — G8427 DOCREV CUR MEDS BY ELIG CLIN: HCPCS | Performed by: STUDENT IN AN ORGANIZED HEALTH CARE EDUCATION/TRAINING PROGRAM

## 2022-03-01 RX ORDER — LIDOCAINE 50 MG/G
1 PATCH TOPICAL DAILY
Qty: 30 PATCH | Refills: 1 | Status: SHIPPED | OUTPATIENT
Start: 2022-03-01 | End: 2022-09-08

## 2022-03-01 NOTE — PROGRESS NOTES
[facility],[address] PHYSICAL MEDICINE AND REHABILITATION  [address] [place] [address]  Dept: [phone]  Dept Fax: [phone]    Outpatient Followup Note    Pam Goins is a 45y.o.-year-old male presenting for follow up of neck pain and left elbow pain. HPI:     He was last seen on 2/1/22 for follow up of neck pain, left elbow pain, and right wrist pain. Since that time, he reports feeling about the same. He states that the increase in the celebrex dose did not help much. He continues to report left- greater than right-sided neck pain and left medial elbow pain. He also notes left upper back pain near the medial scapula. He states that this upper back pain worsens with increased use of the upper limbs. He denies any numbness/tingling in the bilateral upper limbs. He continues to report that left elbow pain is brought on by making a fist with the left hand. He notes that he does his home exercise program multiple times per day for the neck, upper limbs, and back. He states that these exercises help but do not completely resolve the pain. He also has ongoing right wrist pain for which he has a surgery scheduled with Dr. Jaret Mendoza on 3/14/22.       Past Medical History:   Diagnosis Date    ALT (SGPT) level raised 4/6/2015    Bilateral thoracic back pain 9/27/2015    Bronchitis 12/13/2015    Contusion of right knee 3/26/2018    DDD (degenerative disc disease), cervical 4/6/2015    Dyslipidemia 4/6/2015    Dyspepsia 4/6/2015    Fractured toe 06/2020    Herniated cervical disc 4/6/2015    Herpes zoster without complication 3/83/9309    Hypoglycemia 6/1/2016    Hypogonadism in male     Immunization counseling 9/26/2017    Insomnia 9/27/2015    Leukocytopenia, unspecified 6/1/2016    Leukopenia 6/17/2016    Myofascial muscle pain 4/6/2015    Need for shingles vaccine 6/5/2018    Pain in abdominal muscle of right flank Active Member of Clubs or Organizations: Not on file    Attends Club or Organization Meetings: Not on file    Marital Status: Not on file   Intimate Partner Violence:     Fear of Current or Ex-Partner: Not on file    Emotionally Abused: Not on file    Physically Abused: Not on file    Sexually Abused: Not on file   Housing Stability:     Unable to Pay for Housing in the Last Year: Not on file    Number of [place] in the Last Year: Not on file    Unstable Housing in the Last Year: Not on file       No Known Allergies    Current Outpatient Medications   Medication Sig Dispense Refill    fluticasone (FLONASE) 50 MCG/ACT nasal spray 1 spray by Nasal route daily 1 each 3    cyclobenzaprine (FLEXERIL) 5 MG tablet Take 1 tablet by mouth nightly 30 tablet 1    lidocaine (LIDODERM) 5 % Place 1 patch onto the skin daily 12 hours on, 12 hours off. 30 patch 1    celecoxib (CELEBREX) 100 MG capsule Take 1 capsule by mouth daily 30 capsule 0    Cholecalciferol (VITAMIN D3) 125 MCG (5000 UT) CAPS Take 1 capsule by mouth Daily 30 capsule 3    omeprazole (PRILOSEC) 40 MG delayed release capsule TAKE ONE CAPSULE BY MOUTH DAILY 30 capsule 3    amitriptyline (ELAVIL) 75 MG tablet Take 1 tablet by mouth 2 times daily 60 tablet 3    RESTASIS 0.05 % ophthalmic emulsion       Omega-3 Fatty Acids (FISH OIL) 1000 MG CAPS Take 3,000 mg by mouth 3 times daily      Glucosamine-Chondroit-Vit C-Mn (GLUCOSAMINE 1500 COMPLEX PO) Take by mouth      Multiple Vitamins-Minerals (THERAPEUTIC MULTIVITAMIN-MINERALS) tablet Take 1 tablet by mouth daily      Diclofenac Sodium (PENNSAID) 2 % SOLN Apply small amount to painful area twice daily (Patient not taking: Reported on 2/1/2022) 20 g 1    Cream Base (SALT STABLE LS ADVANCED) CREA  (Patient not taking: Reported on 3/1/2022)       No current facility-administered medications for this visit. Subjective:      Review of Systems   Constitutional: Positive for fatigue. Musculoskeletal: Positive for arthralgias, back pain and neck pain. Neurological: Negative for numbness. Objective:     Physical Exam  /84   Pulse 87   Temp 97.3 °F (36.3 °C)   Ht 5' 8\" (1.727 m)   Wt 212 lb 9.6 oz (96.4 kg)   BMI 32.33 kg/m²     Constitutional: He appears well-developed and well-nourished. In no distress. HEENT: NCAT, EOM grossly intact. Hearing grossly intact. Pulmonary/Chest: Respirations WNL and unlabored. MSK:  Mild tenderness to palpation of the bilateral trapezius muscles as well as the midline upper thoracic spine and bilateral paraspinal musculature. Tightness of the left trapezius muscle and left upper thoracic paraspinal musclature noted. Decreased ROM of the cervical spine with bilateral rotation. Strength 5/5 in bilateral upper limbs. Right wrist brace in place. Tenderness to palpation of the left medial elbow over the epicondyle. No pain with passive left wrist extension or resisted left wrist flexion. Neurological: He is alert and oriented to person, place, and time. Sensation to light touch intact in bilateral upper limbs. Negative Tinel sign over the left medial elbow. Skin: Skin is warm dry and intact with good turgor. Psychiatric: He has a normal mood and affect. His behavior is normal. Thought content normal.    Nursing note and vitals reviewed. Assessment:       Diagnosis Orders   1. Chronic neck pain     2.  Medial epicondylitis of left elbow          Plan:       - Continue home exercise program  - Provided prescription for lidocaine patches for left neck and upper back pain  - Recommended that he call Dr. Edilia Guerra office to see if his celebrex needs to be held prior to surgery on 3/14  - Follow up with Dr. Marcus Alvarado as directed  - Could consider gabapentin in the future for pain  - Could consider referral to pain management for possible injections in the future      Orders Placed This Encounter   Medications    lidocaine (LIDODERM) 5 % Sig: Place 1 patch onto the skin daily 12 hours on, 12 hours off. Dispense:  30 patch     Refill:  1       Return in about 6 weeks (around 4/12/2022).        Electronically signed by Rosio Hernandez MD on 3/6/2022 at 7:23 PM.

## 2022-03-06 ASSESSMENT — ENCOUNTER SYMPTOMS: BACK PAIN: 1

## 2022-04-19 ENCOUNTER — OFFICE VISIT (OUTPATIENT)
Dept: PHYSICAL MEDICINE AND REHAB | Age: 52
End: 2022-04-19
Payer: MEDICARE

## 2022-04-19 VITALS
HEART RATE: 74 BPM | BODY MASS INDEX: 32.67 KG/M2 | TEMPERATURE: 97.3 F | WEIGHT: 215.6 LBS | HEIGHT: 68 IN | SYSTOLIC BLOOD PRESSURE: 128 MMHG | DIASTOLIC BLOOD PRESSURE: 87 MMHG

## 2022-04-19 DIAGNOSIS — M77.02 MEDIAL EPICONDYLITIS OF LEFT ELBOW: ICD-10-CM

## 2022-04-19 DIAGNOSIS — G89.29 CHRONIC NECK PAIN: Primary | ICD-10-CM

## 2022-04-19 DIAGNOSIS — M54.2 CHRONIC NECK PAIN: Primary | ICD-10-CM

## 2022-04-19 PROCEDURE — G8427 DOCREV CUR MEDS BY ELIG CLIN: HCPCS | Performed by: STUDENT IN AN ORGANIZED HEALTH CARE EDUCATION/TRAINING PROGRAM

## 2022-04-19 PROCEDURE — 3017F COLORECTAL CA SCREEN DOC REV: CPT | Performed by: STUDENT IN AN ORGANIZED HEALTH CARE EDUCATION/TRAINING PROGRAM

## 2022-04-19 PROCEDURE — 1036F TOBACCO NON-USER: CPT | Performed by: STUDENT IN AN ORGANIZED HEALTH CARE EDUCATION/TRAINING PROGRAM

## 2022-04-19 PROCEDURE — G8417 CALC BMI ABV UP PARAM F/U: HCPCS | Performed by: STUDENT IN AN ORGANIZED HEALTH CARE EDUCATION/TRAINING PROGRAM

## 2022-04-19 PROCEDURE — 99214 OFFICE O/P EST MOD 30 MIN: CPT | Performed by: STUDENT IN AN ORGANIZED HEALTH CARE EDUCATION/TRAINING PROGRAM

## 2022-04-27 NOTE — PROGRESS NOTES
[facility],[address] PHYSICAL MEDICINE AND REHABILITATION  [address] [place] [address]  Dept: [phone]  Dept Fax: [phone]    Outpatient Followup Note    Jackelin Abdalla is a 45y.o.-year-old male presenting for follow up of neck pain. HPI:     He was last seen on 3/1/22 for follow up of neck pain and left elbow pain. Since that time, he reports that neck pain is about the same or worse and neck ROM is decreased, which he thinks is related to not going to the chiropractor. He notes that his insurance will only cover a set number of chiropractic visits per year and that the chiropractor tried to get additional visits approved without success. He continues to note that neck pain worsens with increased use of the bilateral upper limbs. He localizes the pain bilaterally at the base of the skull. He states that neck pain is worse than upper back pain that he also has sometimes. He feels like lidocaine patches helped some. He notes that left elbow pain is about the same. He reports occasional shooting pain at the lateral wrist/thumb also. He states that he has tried cymbalta and lyrica for pain in the past without improvement. Since the last visit, he also underwent right first extensor compartment release on 3/10/22. He reports improvement in right wrist pain but feels like he still has some limitation in ROM.       Past Medical History:   Diagnosis Date    ALT (SGPT) level raised 4/6/2015    Bilateral thoracic back pain 9/27/2015    Bronchitis 12/13/2015    Contusion of right knee 3/26/2018    DDD (degenerative disc disease), cervical 4/6/2015    Dyslipidemia 4/6/2015    Dyspepsia 4/6/2015    Fractured toe 06/2020    Herniated cervical disc 4/6/2015    Herpes zoster without complication 4/44/3664    Hypoglycemia 6/1/2016    Hypogonadism in male     Immunization counseling 9/26/2017    Insomnia 9/27/2015    Leukocytopenia, unspecified 2016    Leukopenia 2016    Myofascial muscle pain 2015    Need for shingles vaccine 2018    Pain in abdominal muscle of right flank 2016    Pharyngoesophageal dysphagia 2017    Post laminectomy syndrome 10/17/2016    Prostate cancer screening 2016    Rhinosinusitis 2015      Past Surgical History:   Procedure Laterality Date    CARDIAC CATHETERIZATION  2006    COLONOSCOPY      ENDOSCOPY, COLON, DIAGNOSTIC  2008    HERNIA REPAIR Bilateral 2005    LUMBAR LAMINECTOMY  2013    by Dr. Timothy Gibson N/A 2017    EGD DILATION SAVORY performed by Pascual Flores MD at [address] [place] [address] History   Family history unknown: Yes     Social History     Socioeconomic History    Marital status:      Spouse name: Not on file    Number of children: Not on file    Years of education: Not on file    Highest education level: Not on file   Occupational History    Not on file   Tobacco Use    Smoking status: Former Smoker     Quit date: 1997     Years since quittin.3    Smokeless tobacco: Never Used   Vaping Use    Vaping Use: Never used   Substance and Sexual Activity    Alcohol use: No    Drug use: No    Sexual activity: Yes     Partners: Female   Other Topics Concern    Not on file   Social History Narrative    Not on file     Social Determinants of Health     Financial Resource Strain:     Difficulty of Paying Living Expenses: Not on file   Food Insecurity:     Worried About [address] in the Last Year: Not on file    [address] in the Last Year: Not on file   Transportation Needs:     Lack of Transportation (Medical): Not on file    Lack of Transportation (Non-Medical):  Not on file   Physical Activity:     Days of Exercise per Week: Not on file    Minutes of Exercise per Session: Not on file   Stress:     Feeling of Stress : Not on file   Social Connections:     Frequency of Communication with Friends and Family: Not on file    Frequency of Social Gatherings with Friends and Family: Not on file    Attends Sikh Services: Not on file    Active Member of Clubs or Organizations: Not on file    Attends Club or Organization Meetings: Not on file    Marital Status: Not on file   Intimate Partner Violence:     Fear of Current or Ex-Partner: Not on file    Emotionally Abused: Not on file    Physically Abused: Not on file    Sexually Abused: Not on file   Housing Stability:     Unable to Pay for Housing in the Last Year: Not on file    Number of [place] in the Last Year: Not on file    Unstable Housing in the Last Year: Not on file       No Known Allergies    Current Outpatient Medications   Medication Sig Dispense Refill    naproxen sodium (ANAPROX) 220 MG tablet Take 1 tablet by mouth 2 times daily (with meals) 60 tablet 1    amitriptyline (ELAVIL) 25 MG tablet Take 1 tablet by mouth in the morning, at noon, and at bedtime 90 tablet 0    fluticasone (FLONASE) 50 MCG/ACT nasal spray 1 spray by Nasal route daily 1 each 3    cyclobenzaprine (FLEXERIL) 5 MG tablet Take 1 tablet by mouth nightly 30 tablet 1    lidocaine (LIDODERM) 5 % Place 1 patch onto the skin daily 12 hours on, 12 hours off.  30 patch 1    Cholecalciferol (VITAMIN D3) 125 MCG (5000 UT) CAPS Take 1 capsule by mouth Daily 30 capsule 3    omeprazole (PRILOSEC) 40 MG delayed release capsule TAKE ONE CAPSULE BY MOUTH DAILY 30 capsule 3    RESTASIS 0.05 % ophthalmic emulsion       Omega-3 Fatty Acids (FISH OIL) 1000 MG CAPS Take 3,000 mg by mouth 3 times daily      Glucosamine-Chondroit-Vit C-Mn (GLUCOSAMINE 1500 COMPLEX PO) Take by mouth      Multiple Vitamins-Minerals (THERAPEUTIC MULTIVITAMIN-MINERALS) tablet Take 1 tablet by mouth daily      Diclofenac Sodium (PENNSAID) 2 % SOLN Apply small amount to painful area twice daily (Patient not taking: Reported on 2/1/2022) 20 g 1    Cream Base (SALT STABLE LS ADVANCED) CREA  (Patient not taking: Reported on 3/1/2022)       No current facility-administered medications for this visit. Subjective:      Review of Systems   Musculoskeletal: Positive for neck pain. Neurological: Positive for headaches. Objective:     Physical Exam  /87   Pulse 74   Temp 97.3 °F (36.3 °C)   Ht 5' 8\" (1.727 m)   Wt 215 lb 9.6 oz (97.8 kg)   BMI 32.78 kg/m²     Constitutional: He appears well-developed and well-nourished. In no distress. HEENT: NCAT, EOM grossly intact. Hearing grossly intact. Pulmonary/Chest: Respirations WNL and unlabored. MSK:  Tenderness to palpation of the bilateral cervical paraspinal musculature. Decreased cervical spine ROM with all movements except flexion. Strength 5/5 in bilateral upper limbs. Tenderness to palpation of the left medial epicondyle. Neurological: He is alert and oriented to person, place, and time. Sensation to light touch intact in bilateral upper limbs. Skin: Skin is warm dry and intact with good turgor. Right lateral wrist incision clean, dry, intact - healing well. Psychiatric: He has a normal mood and affect. His behavior is normal. Thought content normal.    Nursing note and vitals reviewed. Reviewed notes from orthopedic surgery. Assessment:       Diagnosis Orders   1. Chronic neck pain     2. Medial epicondylitis of left elbow          Plan:      - Continue home exercise program  - May continue lidocaine patches as needed for pain  - He states that he has tried cymbalta and lyrica previously for pain without improvement  - He is currently on amitriptyline per his PCP  - Offered to trial gabapentin for neck pain, but patient declines at this time  - Also offered referral to pain management for evaluation regarding possible injections, but patient is not interested at this time  - Patient also goes to a chiropractor, which he thinks is beneficial for neck pain.   He states that his insurance only covers so many visits per year and that the chiropractor tried to get more visits approved without success. - Patient is interested in massage therapy - unsure if his insurance will cover this treatment.   Discussed that patient should check with his insurance to see if massage therapy is covered - if it is and he needs a referral, he will call this office.  - Follow up with Dr. Nida Alford as directed  - Follow up in this clinic as needed      Electronically signed by Vane Pang MD on 4/26/2022 at 10:44 PM.

## 2022-05-09 NOTE — DISCHARGE SUMMARY
[] [facility]        Outpatient Physical                Therapy       [address]       Phone: [phone]       Fax: [phone] [x] [facility]       Promotion at [address] [facility]       Phone: [phone]       Fax: [phone] [] Evie Balderrama      [facility] Promotion     [address]     Phone: [phone]     Fax:  [phone]     Physical Therapy Discharge Note    Date: 2022      Patient: Abdiaziz Gillespie  : 1970  MRN: [id number]    Physician: Cee Schmidt MD                                    Insurance: [employer] (30 vs/ year)    Medical Diagnosis: M65.4 (ICD-10-CM) - De Quervain's tenosynovitis, right                         Rehab Codes: M25.631, M25.641, M79.641, M79.644, M62.81    Onset Date: 21        Onset Date: 12/10/21            Next 's appt: TBD  Visit# / total visits:   RUE: 3/8                       Cancels/No Shows: 0  Date of initial visit: 21                Date of final visit: 21       Discharge Status:     Pt failed to make additional appointments for therapy. Pt. Is now discharged. Electronically signed by: Zandra Galindo PT    If you have any questions or concerns, please don't hesitate to call.   Thank you for your referral.

## 2022-06-09 PROBLEM — H60.501 ACUTE OTITIS EXTERNA OF RIGHT EAR: Status: ACTIVE | Noted: 2022-06-09

## 2022-06-16 PROBLEM — M77.52 BONE SPUR OF LEFT FOOT: Status: ACTIVE | Noted: 2019-08-20

## 2022-06-16 PROBLEM — M25.531 RIGHT WRIST PAIN: Status: ACTIVE | Noted: 2020-06-09

## 2022-06-16 PROBLEM — M79.672 HEEL PAIN, CHRONIC, LEFT: Status: ACTIVE | Noted: 2022-06-16

## 2022-06-16 PROBLEM — G89.29 HEEL PAIN, CHRONIC, LEFT: Status: ACTIVE | Noted: 2022-06-16

## 2022-08-14 PROBLEM — M96.1 POST LAMINECTOMY SYNDROME: Status: ACTIVE | Noted: 2022-08-14

## 2022-09-27 PROBLEM — U07.1 ACUTE BRONCHITIS DUE TO COVID-19 VIRUS: Status: ACTIVE | Noted: 2022-09-27

## 2022-09-27 PROBLEM — J20.8 ACUTE BRONCHITIS DUE TO COVID-19 VIRUS: Status: ACTIVE | Noted: 2022-09-27

## 2022-10-13 PROBLEM — F32.1 CURRENT MODERATE EPISODE OF MAJOR DEPRESSIVE DISORDER (HCC): Status: ACTIVE | Noted: 2022-10-13

## 2022-10-25 ENCOUNTER — HOSPITAL ENCOUNTER (OUTPATIENT)
Age: 52
Discharge: HOME | End: 2022-10-25
Payer: MEDICARE

## 2022-10-25 DIAGNOSIS — R53.83 OTHER FATIGUE: ICD-10-CM

## 2022-10-25 DIAGNOSIS — F32.1 CURRENT MODERATE EPISODE OF MAJOR DEPRESSIVE DISORDER, UNSPECIFIED WHETHER RECURRENT (HCC): ICD-10-CM

## 2022-10-25 LAB
ABSOLUTE EOS #: 0.03 K/UL (ref 0–0.44)
ABSOLUTE IMMATURE GRANULOCYTE: <0.03 K/UL (ref 0–0.3)
ABSOLUTE LYMPH #: 1.74 K/UL (ref 1.1–3.7)
ABSOLUTE MONO #: 0.42 K/UL (ref 0.1–1.2)
ALBUMIN SERPL-MCNC: 4.1 G/DL (ref 3.5–5.2)
ALBUMIN/GLOBULIN RATIO: 1.5 (ref 1–2.5)
ALP BLD-CCNC: 81 U/L (ref 40–129)
ALT SERPL-CCNC: 29 U/L (ref 5–41)
ANION GAP SERPL CALCULATED.3IONS-SCNC: 13 MMOL/L (ref 9–17)
AST SERPL-CCNC: 21 U/L
BASOPHILS # BLD: 0 % (ref 0–2)
BASOPHILS ABSOLUTE: <0.03 K/UL (ref 0–0.2)
BILIRUB SERPL-MCNC: 0.3 MG/DL (ref 0.3–1.2)
BUN BLDV-MCNC: 13 MG/DL (ref 6–20)
CALCIUM SERPL-MCNC: 8.8 MG/DL (ref 8.6–10.4)
CHLORIDE BLD-SCNC: 102 MMOL/L (ref 98–107)
CO2: 23 MMOL/L (ref 20–31)
CREAT SERPL-MCNC: 0.84 MG/DL (ref 0.7–1.2)
EOSINOPHILS RELATIVE PERCENT: 1 % (ref 1–4)
GFR SERPL CREATININE-BSD FRML MDRD: >60 ML/MIN/1.73M2
GLUCOSE BLD-MCNC: 108 MG/DL (ref 70–99)
HCT VFR BLD CALC: 49.7 % (ref 40.7–50.3)
HEMOGLOBIN: 16 G/DL (ref 13–17)
IMMATURE GRANULOCYTES: 0 %
LYMPHOCYTES # BLD: 40 % (ref 24–43)
MCH RBC QN AUTO: 27.2 PG (ref 25.2–33.5)
MCHC RBC AUTO-ENTMCNC: 32.2 G/DL (ref 28.4–34.8)
MCV RBC AUTO: 84.5 FL (ref 82.6–102.9)
MONOCYTES # BLD: 10 % (ref 3–12)
NRBC AUTOMATED: 0 PER 100 WBC
PDW BLD-RTO: 14.3 % (ref 11.8–14.4)
PLATELET # BLD: 231 K/UL (ref 138–453)
PMV BLD AUTO: 11 FL (ref 8.1–13.5)
POTASSIUM SERPL-SCNC: 4.1 MMOL/L (ref 3.7–5.3)
RBC # BLD: 5.88 M/UL (ref 4.21–5.77)
SEG NEUTROPHILS: 49 % (ref 36–65)
SEGMENTED NEUTROPHILS ABSOLUTE COUNT: 2.14 K/UL (ref 1.5–8.1)
SODIUM BLD-SCNC: 138 MMOL/L (ref 135–144)
THYROXINE, FREE: 1 NG/DL (ref 0.93–1.7)
TOTAL PROTEIN: 6.9 G/DL (ref 6.4–8.3)
TSH SERPL DL<=0.05 MIU/L-ACNC: 2.58 UIU/ML (ref 0.3–5)
WBC # BLD: 4.4 K/UL (ref 3.5–11.3)

## 2022-10-25 PROCEDURE — 84439 ASSAY OF FREE THYROXINE: CPT

## 2022-10-25 PROCEDURE — 36415 COLL VENOUS BLD VENIPUNCTURE: CPT

## 2022-10-25 PROCEDURE — 84443 ASSAY THYROID STIM HORMONE: CPT

## 2022-10-25 PROCEDURE — 85025 COMPLETE CBC W/AUTO DIFF WBC: CPT

## 2022-10-25 PROCEDURE — 80053 COMPREHEN METABOLIC PANEL: CPT

## 2022-11-03 PROBLEM — M25.50 POLYARTHRALGIA: Status: ACTIVE | Noted: 2022-11-03

## 2022-11-03 PROBLEM — E88.81 INSULIN RESISTANCE: Status: ACTIVE | Noted: 2022-11-03

## 2022-11-03 PROBLEM — E88.819 INSULIN RESISTANCE: Status: ACTIVE | Noted: 2022-11-03

## 2023-05-11 PROBLEM — Z00.00 WELLNESS EXAMINATION: Status: ACTIVE | Noted: 2023-05-11

## 2023-05-11 PROBLEM — B35.6 DERMATOPHYTOSIS, GROIN: Status: ACTIVE | Noted: 2023-05-11

## 2023-05-11 PROBLEM — L30.9 ECZEMA: Status: ACTIVE | Noted: 2023-05-11

## 2023-05-11 PROBLEM — Z13.1 SCREENING FOR DIABETES MELLITUS (DM): Status: ACTIVE | Noted: 2021-04-15

## 2023-05-11 PROBLEM — Z12.5 SCREENING PSA (PROSTATE SPECIFIC ANTIGEN): Status: ACTIVE | Noted: 2023-05-11

## 2023-05-18 ENCOUNTER — HOSPITAL ENCOUNTER (OUTPATIENT)
Age: 53
Discharge: HOME | End: 2023-05-18
Payer: MEDICAID

## 2023-05-18 DIAGNOSIS — Z12.5 SCREENING PSA (PROSTATE SPECIFIC ANTIGEN): ICD-10-CM

## 2023-05-18 DIAGNOSIS — Z00.00 WELLNESS EXAMINATION: ICD-10-CM

## 2023-05-18 DIAGNOSIS — Z13.1 SCREENING FOR DIABETES MELLITUS (DM): ICD-10-CM

## 2023-05-18 DIAGNOSIS — E78.5 DYSLIPIDEMIA: ICD-10-CM

## 2023-05-18 LAB
ALBUMIN SERPL-MCNC: 4.1 G/DL (ref 3.5–5.2)
ALBUMIN/GLOB SERPL: 1.6 {RATIO} (ref 1–2.5)
ALP SERPL-CCNC: 78 U/L (ref 40–129)
ALT SERPL-CCNC: 33 U/L (ref 5–41)
ANION GAP SERPL CALCULATED.3IONS-SCNC: 9 MMOL/L (ref 9–17)
AST SERPL-CCNC: 31 U/L
BASOPHILS # BLD: <0.03 K/UL (ref 0–0.2)
BASOPHILS NFR BLD: 0 % (ref 0–2)
BILIRUB SERPL-MCNC: 0.6 MG/DL (ref 0.3–1.2)
BUN SERPL-MCNC: 12 MG/DL (ref 6–20)
CALCIUM SERPL-MCNC: 8.9 MG/DL (ref 8.6–10.4)
CHLORIDE SERPL-SCNC: 105 MMOL/L (ref 98–107)
CHOLEST SERPL-MCNC: 199 MG/DL
CHOLESTEROL/HDL RATIO: 4.7
CO2 SERPL-SCNC: 25 MMOL/L (ref 20–31)
CREAT SERPL-MCNC: 0.98 MG/DL (ref 0.7–1.2)
EOSINOPHIL # BLD: 0.07 K/UL (ref 0–0.44)
EOSINOPHILS RELATIVE PERCENT: 1 % (ref 1–4)
ERYTHROCYTE [DISTWIDTH] IN BLOOD BY AUTOMATED COUNT: 13.8 % (ref 11.8–14.4)
EST. AVERAGE GLUCOSE BLD GHB EST-MCNC: 105 MG/DL
GFR SERPL CREATININE-BSD FRML MDRD: >60 ML/MIN/1.73M2
GLUCOSE SERPL-MCNC: 94 MG/DL (ref 70–99)
HBA1C MFR BLD: 5.3 % (ref 4–6)
HCT VFR BLD AUTO: 48.9 % (ref 40.7–50.3)
HDLC SERPL-MCNC: 42 MG/DL
HGB BLD-MCNC: 15.9 G/DL (ref 13–17)
IMM GRANULOCYTES # BLD AUTO: <0.03 K/UL (ref 0–0.3)
IMM GRANULOCYTES NFR BLD: 0 %
INSULIN COMMENT: NORMAL
INSULIN REFERENCE RANGE:: NORMAL
INSULIN: 10.1 MU/L
LDLC SERPL CALC-MCNC: 137 MG/DL (ref 0–130)
LYMPHOCYTES # BLD: 47 % (ref 24–43)
LYMPHOCYTES NFR BLD: 2.27 K/UL (ref 1.1–3.7)
MCH RBC QN AUTO: 27.6 PG (ref 25.2–33.5)
MCHC RBC AUTO-ENTMCNC: 32.5 G/DL (ref 28.4–34.8)
MCV RBC AUTO: 84.9 FL (ref 82.6–102.9)
MONOCYTES NFR BLD: 0.55 K/UL (ref 0.1–1.2)
MONOCYTES NFR BLD: 11 % (ref 3–12)
NEUTROPHILS NFR BLD: 41 % (ref 36–65)
NEUTS SEG NFR BLD: 2.06 K/UL (ref 1.5–8.1)
NRBC AUTOMATED: 0 PER 100 WBC
PLATELET # BLD AUTO: 211 K/UL (ref 138–453)
PMV BLD AUTO: 10.7 FL (ref 8.1–13.5)
POTASSIUM SERPL-SCNC: 4.2 MMOL/L (ref 3.7–5.3)
PROT SERPL-MCNC: 6.7 G/DL (ref 6.4–8.3)
PSA SERPL-MCNC: 2.1 NG/ML
RBC # BLD AUTO: 5.76 M/UL (ref 4.21–5.77)
SODIUM SERPL-SCNC: 139 MMOL/L (ref 135–144)
TRIGL SERPL-MCNC: 98 MG/DL
WBC OTHER # BLD: 5 K/UL (ref 3.5–11.3)

## 2023-05-18 PROCEDURE — 85025 COMPLETE CBC W/AUTO DIFF WBC: CPT

## 2023-05-18 PROCEDURE — 83036 HEMOGLOBIN GLYCOSYLATED A1C: CPT

## 2023-05-18 PROCEDURE — 83525 ASSAY OF INSULIN: CPT

## 2023-05-18 PROCEDURE — 80053 COMPREHEN METABOLIC PANEL: CPT

## 2023-05-18 PROCEDURE — 80061 LIPID PANEL: CPT

## 2023-05-18 PROCEDURE — G0103 PSA SCREENING: HCPCS

## 2023-05-18 PROCEDURE — 36415 COLL VENOUS BLD VENIPUNCTURE: CPT

## 2023-05-26 ENCOUNTER — HOSPITAL ENCOUNTER (OUTPATIENT)
Age: 53
End: 2023-05-26
Payer: MEDICAID

## 2023-05-26 ENCOUNTER — HOSPITAL ENCOUNTER (OUTPATIENT)
Dept: GENERAL RADIOLOGY | Age: 53
End: 2023-05-26
Payer: MEDICAID

## 2023-05-26 DIAGNOSIS — M54.2 CERVICALGIA: ICD-10-CM

## 2023-05-26 DIAGNOSIS — M54.50 LOW BACK PAIN, UNSPECIFIED BACK PAIN LATERALITY, UNSPECIFIED CHRONICITY, UNSPECIFIED WHETHER SCIATICA PRESENT: ICD-10-CM

## 2023-05-26 PROCEDURE — 72040 X-RAY EXAM NECK SPINE 2-3 VW: CPT

## 2023-05-26 PROCEDURE — 72110 X-RAY EXAM L-2 SPINE 4/>VWS: CPT

## 2023-05-30 PROBLEM — M51.369 DDD (DEGENERATIVE DISC DISEASE), LUMBAR: Status: ACTIVE | Noted: 2023-05-30

## 2023-05-30 PROBLEM — R71.8 ELEVATED RED BLOOD CELL COUNT: Status: ACTIVE | Noted: 2023-05-30

## 2023-05-30 PROBLEM — M51.36 DDD (DEGENERATIVE DISC DISEASE), LUMBAR: Status: ACTIVE | Noted: 2023-05-30

## 2023-06-10 PROBLEM — Z13.1 SCREENING FOR DIABETES MELLITUS (DM): Status: RESOLVED | Noted: 2021-04-15 | Resolved: 2023-06-10

## 2023-06-10 PROBLEM — Z12.11 SCREEN FOR COLON CANCER: Status: RESOLVED | Noted: 2018-08-28 | Resolved: 2023-06-10

## 2023-06-13 PROBLEM — Z00.00 ENCOUNTER FOR WELL ADULT EXAM WITHOUT ABNORMAL FINDINGS: Status: RESOLVED | Noted: 2023-05-11 | Resolved: 2023-06-13

## 2023-07-06 PROBLEM — H65.93 BILATERAL OTITIS MEDIA WITH EFFUSION: Status: ACTIVE | Noted: 2023-07-06

## 2024-02-05 ENCOUNTER — HOSPITAL ENCOUNTER (OUTPATIENT)
Age: 54
Discharge: HOME | End: 2024-02-05
Payer: MEDICAID

## 2024-02-05 DIAGNOSIS — E78.5 DYSLIPIDEMIA: ICD-10-CM

## 2024-02-05 DIAGNOSIS — M79.18 MYOFASCIAL MUSCLE PAIN: ICD-10-CM

## 2024-02-05 DIAGNOSIS — R53.83 OTHER FATIGUE: ICD-10-CM

## 2024-02-05 DIAGNOSIS — R20.0 NUMBNESS: ICD-10-CM

## 2024-02-05 DIAGNOSIS — E55.9 VITAMIN D DEFICIENCY: ICD-10-CM

## 2024-02-05 LAB
25(OH)D3 SERPL-MCNC: 47.1 NG/ML
ALBUMIN SERPL-MCNC: 4.2 G/DL (ref 3.5–5.2)
ALBUMIN/GLOB SERPL: 1.5 {RATIO} (ref 1–2.5)
ALP SERPL-CCNC: 76 U/L (ref 40–129)
ALT SERPL-CCNC: 29 U/L (ref 5–41)
ANION GAP SERPL CALCULATED.3IONS-SCNC: 10 MMOL/L (ref 9–17)
AST SERPL-CCNC: 27 U/L
BASOPHILS # BLD: <0.03 K/UL (ref 0–0.2)
BASOPHILS NFR BLD: 0 % (ref 0–2)
BILIRUB SERPL-MCNC: 0.3 MG/DL (ref 0.3–1.2)
BUN SERPL-MCNC: 12 MG/DL (ref 6–20)
CALCIUM SERPL-MCNC: 9 MG/DL (ref 8.6–10.4)
CHLORIDE SERPL-SCNC: 102 MMOL/L (ref 98–107)
CHOLEST SERPL-MCNC: 227 MG/DL
CHOLESTEROL/HDL RATIO: 4.6
CO2 SERPL-SCNC: 29 MMOL/L (ref 20–31)
CREAT SERPL-MCNC: 1 MG/DL (ref 0.7–1.2)
EOSINOPHIL # BLD: 0.08 K/UL (ref 0–0.44)
EOSINOPHILS RELATIVE PERCENT: 2 % (ref 1–4)
ERYTHROCYTE [DISTWIDTH] IN BLOOD BY AUTOMATED COUNT: 14.7 % (ref 11.8–14.4)
FOLATE SERPL-MCNC: >20 NG/ML
GFR SERPL CREATININE-BSD FRML MDRD: >60 ML/MIN/1.73M2
GLUCOSE SERPL-MCNC: 97 MG/DL (ref 70–99)
HCT VFR BLD AUTO: 50.1 % (ref 40.7–50.3)
HDLC SERPL-MCNC: 49 MG/DL
HGB BLD-MCNC: 15.8 G/DL (ref 13–17)
IMM GRANULOCYTES # BLD AUTO: <0.03 K/UL (ref 0–0.3)
IMM GRANULOCYTES NFR BLD: 0 %
LDLC SERPL CALC-MCNC: 148 MG/DL (ref 0–130)
LYMPHOCYTES NFR BLD: 2.05 K/UL (ref 1.1–3.7)
LYMPHOCYTES RELATIVE PERCENT: 38 % (ref 24–43)
MCH RBC QN AUTO: 27.2 PG (ref 25.2–33.5)
MCHC RBC AUTO-ENTMCNC: 31.5 G/DL (ref 28.4–34.8)
MCV RBC AUTO: 86.2 FL (ref 82.6–102.9)
MONOCYTES NFR BLD: 0.56 K/UL (ref 0.1–1.2)
MONOCYTES NFR BLD: 10 % (ref 3–12)
NEUTROPHILS NFR BLD: 50 % (ref 36–65)
NEUTS SEG NFR BLD: 2.73 K/UL (ref 1.5–8.1)
NRBC BLD-RTO: 0 PER 100 WBC
PLATELET # BLD AUTO: 193 K/UL (ref 138–453)
PMV BLD AUTO: 11.3 FL (ref 8.1–13.5)
POTASSIUM SERPL-SCNC: 4.5 MMOL/L (ref 3.7–5.3)
PROT SERPL-MCNC: 7 G/DL (ref 6.4–8.3)
RBC # BLD AUTO: 5.81 M/UL (ref 4.21–5.77)
RBC # BLD: ABNORMAL 10*6/UL
SODIUM SERPL-SCNC: 141 MMOL/L (ref 135–144)
T4 FREE SERPL-MCNC: 0.9 NG/DL (ref 0.9–1.7)
TRIGL SERPL-MCNC: 151 MG/DL
TSH SERPL DL<=0.05 MIU/L-ACNC: 4.7 UIU/ML (ref 0.3–5)
VIT B12 SERPL-MCNC: 468 PG/ML (ref 232–1245)
WBC OTHER # BLD: 5.5 K/UL (ref 3.5–11.3)

## 2024-02-05 PROCEDURE — 82746 ASSAY OF FOLIC ACID SERUM: CPT

## 2024-02-05 PROCEDURE — 84439 ASSAY OF FREE THYROXINE: CPT

## 2024-02-05 PROCEDURE — 80053 COMPREHEN METABOLIC PANEL: CPT

## 2024-02-05 PROCEDURE — 82306 VITAMIN D 25 HYDROXY: CPT

## 2024-02-05 PROCEDURE — 84443 ASSAY THYROID STIM HORMONE: CPT

## 2024-02-05 PROCEDURE — 82607 VITAMIN B-12: CPT

## 2024-02-05 PROCEDURE — 85025 COMPLETE CBC W/AUTO DIFF WBC: CPT

## 2024-02-05 PROCEDURE — 80061 LIPID PANEL: CPT

## 2024-02-05 PROCEDURE — 36415 COLL VENOUS BLD VENIPUNCTURE: CPT

## 2024-05-14 PROBLEM — Z12.11 SCREENING FOR COLORECTAL CANCER: Status: ACTIVE | Noted: 2024-05-14

## 2024-05-14 PROBLEM — Z11.59 NEED FOR HEPATITIS C SCREENING TEST: Status: ACTIVE | Noted: 2024-05-14

## 2024-05-14 PROBLEM — Z12.12 SCREENING FOR COLORECTAL CANCER: Status: ACTIVE | Noted: 2024-05-14

## 2024-05-14 PROBLEM — E03.8 SUBCLINICAL HYPOTHYROIDISM: Status: ACTIVE | Noted: 2024-05-14

## 2024-05-14 PROBLEM — Z71.89 ACP (ADVANCE CARE PLANNING): Status: ACTIVE | Noted: 2024-05-14

## 2024-05-14 PROBLEM — Z00.00 ENCOUNTER FOR WELL ADULT EXAM WITHOUT ABNORMAL FINDINGS: Status: ACTIVE | Noted: 2024-05-14

## 2024-05-14 PROBLEM — Z72.89 OTHER PROBLEMS RELATED TO LIFESTYLE: Status: ACTIVE | Noted: 2024-05-14

## 2024-05-25 PROBLEM — R63.4 WEIGHT LOSS, ABNORMAL: Status: ACTIVE | Noted: 2024-05-25

## 2024-06-07 ENCOUNTER — HOSPITAL ENCOUNTER (OUTPATIENT)
Age: 54
Discharge: HOME | End: 2024-06-07
Payer: COMMERCIAL

## 2024-06-07 DIAGNOSIS — E78.5 DYSLIPIDEMIA: ICD-10-CM

## 2024-06-07 DIAGNOSIS — E03.8 SUBCLINICAL HYPOTHYROIDISM: ICD-10-CM

## 2024-06-07 DIAGNOSIS — Z11.59 NEED FOR HEPATITIS C SCREENING TEST: ICD-10-CM

## 2024-06-07 DIAGNOSIS — R63.4 WEIGHT LOSS, ABNORMAL: ICD-10-CM

## 2024-06-07 DIAGNOSIS — Z72.89 OTHER PROBLEMS RELATED TO LIFESTYLE: ICD-10-CM

## 2024-06-07 DIAGNOSIS — R71.8 ELEVATED RED BLOOD CELL COUNT: ICD-10-CM

## 2024-06-07 LAB
ALBUMIN SERPL-MCNC: 4.4 G/DL (ref 3.5–5.2)
ALBUMIN/GLOB SERPL: 2 {RATIO} (ref 1–2.5)
ALP SERPL-CCNC: 72 U/L (ref 40–129)
ALT SERPL-CCNC: 24 U/L (ref 10–50)
ANION GAP SERPL CALCULATED.3IONS-SCNC: 10 MMOL/L (ref 9–16)
AST SERPL-CCNC: 29 U/L (ref 10–50)
BASOPHILS # BLD: <0.03 K/UL (ref 0–0.2)
BASOPHILS NFR BLD: 1 % (ref 0–2)
BILIRUB SERPL-MCNC: 0.4 MG/DL (ref 0–1.2)
BUN SERPL-MCNC: 9 MG/DL (ref 6–20)
CALCIUM SERPL-MCNC: 8.8 MG/DL (ref 8.6–10.4)
CHLORIDE SERPL-SCNC: 104 MMOL/L (ref 98–107)
CHOLEST SERPL-MCNC: 188 MG/DL (ref 0–199)
CHOLESTEROL/HDL RATIO: 3
CO2 SERPL-SCNC: 26 MMOL/L (ref 20–31)
CREAT SERPL-MCNC: 1 MG/DL (ref 0.7–1.2)
EOSINOPHIL # BLD: <0.03 K/UL (ref 0–0.44)
EOSINOPHILS RELATIVE PERCENT: 1 % (ref 1–4)
ERYTHROCYTE [DISTWIDTH] IN BLOOD BY AUTOMATED COUNT: 16.5 % (ref 11.8–14.4)
GFR, ESTIMATED: >90 ML/MIN/1.73M2
GLUCOSE SERPL-MCNC: 80 MG/DL (ref 74–99)
HCT VFR BLD AUTO: 49 % (ref 40.7–50.3)
HCV AB SERPL QL IA: NONREACTIVE
HDLC SERPL-MCNC: 67 MG/DL
HGB BLD-MCNC: 15.1 G/DL (ref 13–17)
IMM GRANULOCYTES # BLD AUTO: <0.03 K/UL (ref 0–0.3)
IMM GRANULOCYTES NFR BLD: 0 %
LDLC SERPL CALC-MCNC: 112 MG/DL (ref 0–100)
LYMPHOCYTES NFR BLD: 1.36 K/UL (ref 1.1–3.7)
LYMPHOCYTES RELATIVE PERCENT: 42 % (ref 24–43)
MCH RBC QN AUTO: 27.2 PG (ref 25.2–33.5)
MCHC RBC AUTO-ENTMCNC: 30.8 G/DL (ref 28.4–34.8)
MCV RBC AUTO: 88.3 FL (ref 82.6–102.9)
MONOCYTES NFR BLD: 0.34 K/UL (ref 0.1–1.2)
MONOCYTES NFR BLD: 11 % (ref 3–12)
NEUTROPHILS NFR BLD: 45 % (ref 36–65)
NEUTS SEG NFR BLD: 1.47 K/UL (ref 1.5–8.1)
NRBC BLD-RTO: 0 PER 100 WBC
PLATELET # BLD AUTO: 198 K/UL (ref 138–453)
PMV BLD AUTO: 11.3 FL (ref 8.1–13.5)
POTASSIUM SERPL-SCNC: 4 MMOL/L (ref 3.7–5.3)
PROT SERPL-MCNC: 6.7 G/DL (ref 6.6–8.7)
RBC # BLD AUTO: 5.55 M/UL (ref 4.21–5.77)
RBC # BLD: ABNORMAL 10*6/UL
SODIUM SERPL-SCNC: 140 MMOL/L (ref 136–145)
T4 FREE SERPL-MCNC: 1.3 NG/DL (ref 0.92–1.68)
TRIGL SERPL-MCNC: 49 MG/DL
TSH SERPL DL<=0.05 MIU/L-ACNC: 1.6 UIU/ML (ref 0.27–4.2)
VLDLC SERPL CALC-MCNC: 10 MG/DL
WBC OTHER # BLD: 3.2 K/UL (ref 3.5–11.3)

## 2024-06-07 PROCEDURE — 84443 ASSAY THYROID STIM HORMONE: CPT

## 2024-06-07 PROCEDURE — 36415 COLL VENOUS BLD VENIPUNCTURE: CPT

## 2024-06-07 PROCEDURE — 86803 HEPATITIS C AB TEST: CPT

## 2024-06-07 PROCEDURE — 85025 COMPLETE CBC W/AUTO DIFF WBC: CPT

## 2024-06-07 PROCEDURE — 80061 LIPID PANEL: CPT

## 2024-06-07 PROCEDURE — 84439 ASSAY OF FREE THYROXINE: CPT

## 2024-06-07 PROCEDURE — 80053 COMPREHEN METABOLIC PANEL: CPT

## 2024-06-10 PROBLEM — D72.819 LEUCOPENIA: Status: ACTIVE | Noted: 2024-06-10

## 2024-06-13 PROBLEM — Z12.12 SCREENING FOR COLORECTAL CANCER: Status: RESOLVED | Noted: 2024-05-14 | Resolved: 2024-06-13

## 2024-06-13 PROBLEM — Z12.11 SCREENING FOR COLORECTAL CANCER: Status: RESOLVED | Noted: 2024-05-14 | Resolved: 2024-06-13

## 2024-06-13 PROBLEM — Z00.00 ENCOUNTER FOR WELL ADULT EXAM WITHOUT ABNORMAL FINDINGS: Status: RESOLVED | Noted: 2024-05-14 | Resolved: 2024-06-13

## 2024-06-13 PROBLEM — Z11.59 NEED FOR HEPATITIS C SCREENING TEST: Status: RESOLVED | Noted: 2024-05-14 | Resolved: 2024-06-13

## 2024-07-03 ENCOUNTER — HOSPITAL ENCOUNTER (OUTPATIENT)
Age: 54
Discharge: HOME | End: 2024-07-03
Payer: COMMERCIAL

## 2024-07-03 DIAGNOSIS — D72.818 OTHER DECREASED WHITE BLOOD CELL (WBC) COUNT: ICD-10-CM

## 2024-07-03 DIAGNOSIS — T56.1X1A TOXIC EFFECT OF MERCURY: ICD-10-CM

## 2024-07-03 LAB
BASOPHILS # BLD: <0.03 K/UL (ref 0–0.2)
BASOPHILS NFR BLD: 1 % (ref 0–2)
EOSINOPHIL # BLD: 0.06 K/UL (ref 0–0.44)
EOSINOPHILS RELATIVE PERCENT: 2 % (ref 1–4)
ERYTHROCYTE [DISTWIDTH] IN BLOOD BY AUTOMATED COUNT: 15.5 % (ref 11.8–14.4)
HCT VFR BLD AUTO: 50.3 % (ref 40.7–50.3)
HGB BLD-MCNC: 16.1 G/DL (ref 13–17)
IMM GRANULOCYTES # BLD AUTO: <0.03 K/UL (ref 0–0.3)
IMM GRANULOCYTES NFR BLD: 0 %
LYMPHOCYTES NFR BLD: 2.02 K/UL (ref 1.1–3.7)
LYMPHOCYTES RELATIVE PERCENT: 50 % (ref 24–43)
MCH RBC QN AUTO: 27.5 PG (ref 25.2–33.5)
MCHC RBC AUTO-ENTMCNC: 32 G/DL (ref 28.4–34.8)
MCV RBC AUTO: 85.8 FL (ref 82.6–102.9)
MONOCYTES NFR BLD: 0.45 K/UL (ref 0.1–1.2)
MONOCYTES NFR BLD: 11 % (ref 3–12)
NEUTROPHILS NFR BLD: 36 % (ref 36–65)
NEUTS SEG NFR BLD: 1.46 K/UL (ref 1.5–8.1)
NRBC BLD-RTO: 0 PER 100 WBC
PLATELET # BLD AUTO: 204 K/UL (ref 138–453)
PMV BLD AUTO: 10.8 FL (ref 8.1–13.5)
RBC # BLD AUTO: 5.86 M/UL (ref 4.21–5.77)
RBC # BLD: ABNORMAL 10*6/UL
WBC OTHER # BLD: 4 K/UL (ref 3.5–11.3)

## 2024-07-03 PROCEDURE — 36415 COLL VENOUS BLD VENIPUNCTURE: CPT

## 2024-07-03 PROCEDURE — 85025 COMPLETE CBC W/AUTO DIFF WBC: CPT

## 2024-07-03 PROCEDURE — 83825 ASSAY OF MERCURY: CPT

## 2024-07-05 LAB — MERCURY BLD-MCNC: <2.5 UG/L

## 2024-07-08 PROBLEM — R63.4 WEIGHT LOSS, NON-INTENTIONAL: Status: ACTIVE | Noted: 2024-07-08

## 2025-09-02 ENCOUNTER — HOSPITAL ENCOUNTER (OUTPATIENT)
Dept: LAB | Age: 55
Discharge: HOME | End: 2025-09-02
Payer: COMMERCIAL

## 2025-09-02 DIAGNOSIS — M79.18 MYOFASCIAL MUSCLE PAIN: ICD-10-CM

## 2025-09-02 DIAGNOSIS — E03.8 SUBCLINICAL HYPOTHYROIDISM: ICD-10-CM

## 2025-09-02 DIAGNOSIS — R53.1 WEAKNESS: ICD-10-CM

## 2025-09-02 DIAGNOSIS — E55.9 VITAMIN D DEFICIENCY: ICD-10-CM

## 2025-09-02 LAB
25(OH)D3 SERPL-MCNC: 37.9 NG/ML (ref 30–100)
ALBUMIN SERPL-MCNC: 4.5 G/DL (ref 3.5–5.2)
ALBUMIN/GLOB SERPL: 1.7 {RATIO} (ref 1–2.5)
ALP SERPL-CCNC: 68 U/L (ref 40–129)
ALT SERPL-CCNC: 39 U/L (ref 10–50)
ANION GAP SERPL CALCULATED.3IONS-SCNC: 12 MMOL/L (ref 9–16)
AST SERPL-CCNC: 29 U/L (ref 10–50)
BASOPHILS # BLD: <0.03 K/UL (ref 0–0.2)
BASOPHILS NFR BLD: 0 % (ref 0–2)
BILIRUB SERPL-MCNC: 0.8 MG/DL (ref 0–1.2)
BUN SERPL-MCNC: 14 MG/DL (ref 6–20)
CALCIUM SERPL-MCNC: 9.3 MG/DL (ref 8.6–10.4)
CHLORIDE SERPL-SCNC: 103 MMOL/L (ref 98–107)
CO2 SERPL-SCNC: 26 MMOL/L (ref 20–31)
CREAT SERPL-MCNC: 1.2 MG/DL (ref 0.7–1.2)
EOSINOPHIL # BLD: 0.04 K/UL (ref 0–0.44)
EOSINOPHILS RELATIVE PERCENT: 1 % (ref 1–4)
ERYTHROCYTE [DISTWIDTH] IN BLOOD BY AUTOMATED COUNT: 13.7 % (ref 11.8–14.4)
ERYTHROCYTE [SEDIMENTATION RATE] IN BLOOD BY PHOTOMETRIC METHOD: 9 MM/HR (ref 0–20)
FOLATE SERPL-MCNC: 38.6 NG/ML (ref 4.8–24.2)
GFR, ESTIMATED: 71 ML/MIN/1.73M2
GLUCOSE SERPL-MCNC: 81 MG/DL (ref 74–99)
HCT VFR BLD AUTO: 49.6 % (ref 40.7–50.3)
HGB BLD-MCNC: 16.1 G/DL (ref 13–17)
IMM GRANULOCYTES # BLD AUTO: <0.03 K/UL (ref 0–0.3)
IMM GRANULOCYTES NFR BLD: 0 %
LYMPHOCYTES NFR BLD: 1.55 K/UL (ref 1.1–3.7)
LYMPHOCYTES RELATIVE PERCENT: 37 % (ref 24–43)
MAGNESIUM, IONIZED: 0.67 MMOL/L (ref 0.45–0.6)
MCH RBC QN AUTO: 27.5 PG (ref 25.2–33.5)
MCHC RBC AUTO-ENTMCNC: 32.5 G/DL (ref 28.4–34.8)
MCV RBC AUTO: 84.6 FL (ref 82.6–102.9)
MONOCYTES NFR BLD: 0.32 K/UL (ref 0.1–1.2)
MONOCYTES NFR BLD: 8 % (ref 3–12)
NEUTROPHILS NFR BLD: 54 % (ref 36–65)
NEUTS SEG NFR BLD: 2.28 K/UL (ref 1.5–8.1)
NRBC BLD-RTO: 0 PER 100 WBC
PLATELET # BLD AUTO: 207 K/UL (ref 138–453)
PMV BLD AUTO: 10.7 FL (ref 8.1–13.5)
POTASSIUM SERPL-SCNC: 4.3 MMOL/L (ref 3.7–5.3)
PROT SERPL-MCNC: 7.2 G/DL (ref 6.6–8.7)
RBC # BLD AUTO: 5.86 M/UL (ref 4.21–5.77)
SODIUM SERPL-SCNC: 141 MMOL/L (ref 136–145)
T4 FREE SERPL-MCNC: 1.1 NG/DL (ref 0.92–1.68)
TSH SERPL DL<=0.05 MIU/L-ACNC: 3.41 UIU/ML (ref 0.27–4.2)
VIT B12 SERPL-MCNC: 609 PG/ML (ref 232–1245)
WBC OTHER # BLD: 4.2 K/UL (ref 3.5–11.3)

## 2025-09-02 PROCEDURE — 84443 ASSAY THYROID STIM HORMONE: CPT

## 2025-09-02 PROCEDURE — 85025 COMPLETE CBC W/AUTO DIFF WBC: CPT

## 2025-09-02 PROCEDURE — 84439 ASSAY OF FREE THYROXINE: CPT

## 2025-09-02 PROCEDURE — 85652 RBC SED RATE AUTOMATED: CPT

## 2025-09-02 PROCEDURE — 80053 COMPREHEN METABOLIC PANEL: CPT

## 2025-09-02 PROCEDURE — 36415 COLL VENOUS BLD VENIPUNCTURE: CPT

## 2025-09-02 PROCEDURE — 83735 ASSAY OF MAGNESIUM: CPT

## 2025-09-02 PROCEDURE — 82607 VITAMIN B-12: CPT

## 2025-09-02 PROCEDURE — 82746 ASSAY OF FOLIC ACID SERUM: CPT

## 2025-09-02 PROCEDURE — 82306 VITAMIN D 25 HYDROXY: CPT

## (undated) DEVICE — MEDI-VAC NON-CONDUCTIVE SUCTION TUBING: Brand: CARDINAL HEALTH

## (undated) DEVICE — BLOCK BITE 60FR RUBBER ADLT DENTAL

## (undated) DEVICE — GLOVE SURG SZ 8 L12IN THK91MIL BRN LTX FREE POLYCHLOROPRENE

## (undated) DEVICE — GAUZE,SPONGE,4"X4",16PLY,STRL,LF,10/TRAY: Brand: MEDLINE

## (undated) DEVICE — JELLY,LUBE,STERILE,FLIP TOP,TUBE,2-OZ: Brand: MEDLINE